# Patient Record
Sex: FEMALE | Race: WHITE | NOT HISPANIC OR LATINO | Employment: FULL TIME | URBAN - METROPOLITAN AREA
[De-identification: names, ages, dates, MRNs, and addresses within clinical notes are randomized per-mention and may not be internally consistent; named-entity substitution may affect disease eponyms.]

---

## 2017-03-01 ENCOUNTER — ALLSCRIPTS OFFICE VISIT (OUTPATIENT)
Dept: OTHER | Facility: OTHER | Age: 22
End: 2017-03-01

## 2017-03-01 DIAGNOSIS — M54.50 LOW BACK PAIN: ICD-10-CM

## 2017-03-13 ENCOUNTER — GENERIC CONVERSION - ENCOUNTER (OUTPATIENT)
Dept: OTHER | Facility: OTHER | Age: 22
End: 2017-03-13

## 2017-03-27 ENCOUNTER — ALLSCRIPTS OFFICE VISIT (OUTPATIENT)
Dept: OTHER | Facility: OTHER | Age: 22
End: 2017-03-27

## 2017-05-16 ENCOUNTER — ALLSCRIPTS OFFICE VISIT (OUTPATIENT)
Dept: OTHER | Facility: OTHER | Age: 22
End: 2017-05-16

## 2017-05-19 ENCOUNTER — LAB (OUTPATIENT)
Dept: LAB | Facility: HOSPITAL | Age: 22
End: 2017-05-19

## 2017-05-19 ENCOUNTER — TRANSCRIBE ORDERS (OUTPATIENT)
Dept: LAB | Facility: HOSPITAL | Age: 22
End: 2017-05-19

## 2017-05-19 DIAGNOSIS — Z11.1 SCREENING EXAMINATION FOR PULMONARY TUBERCULOSIS: ICD-10-CM

## 2017-05-19 DIAGNOSIS — Z11.1 SCREENING EXAMINATION FOR PULMONARY TUBERCULOSIS: Primary | ICD-10-CM

## 2017-05-19 PROCEDURE — 36415 COLL VENOUS BLD VENIPUNCTURE: CPT

## 2017-05-19 PROCEDURE — 86480 TB TEST CELL IMMUN MEASURE: CPT

## 2017-05-21 LAB
ANNOTATION COMMENT IMP: NORMAL
GAMMA INTERFERON BACKGROUND BLD IA-ACNC: 0.08 IU/ML
M TB IFN-G BLD-IMP: NEGATIVE
M TB IFN-G CD4+ BCKGRND COR BLD-ACNC: <0.01 IU/ML
M TB IFN-G CD4+ T-CELLS BLD-ACNC: 0.06 IU/ML
MITOGEN IGNF BLD-ACNC: 9.26 IU/ML
QUANTIFERON-TB GOLD IN TUBE: NORMAL
SERVICE CMNT-IMP: NORMAL

## 2017-05-25 DIAGNOSIS — Z00.00 ENCOUNTER FOR GENERAL ADULT MEDICAL EXAMINATION WITHOUT ABNORMAL FINDINGS: ICD-10-CM

## 2017-07-13 ENCOUNTER — ALLSCRIPTS OFFICE VISIT (OUTPATIENT)
Dept: OTHER | Facility: OTHER | Age: 22
End: 2017-07-13

## 2017-07-21 LAB
ADDITIONAL INFORMATION (HISTORICAL): NORMAL
ADEQUACY: (HISTORICAL): NORMAL
COMMENT (HISTORICAL): NORMAL
CYTOTECHNOLOGIST: (HISTORICAL): NORMAL
INTERPRETATION (HISTORICAL): NORMAL
LMP (HISTORICAL): NORMAL
PREV. BX: (HISTORICAL): NORMAL
PREV. PAP (HISTORICAL): NORMAL
SOURCE (HISTORICAL): NORMAL

## 2017-07-28 ENCOUNTER — GENERIC CONVERSION - ENCOUNTER (OUTPATIENT)
Dept: OTHER | Facility: OTHER | Age: 22
End: 2017-07-28

## 2018-01-10 NOTE — RESULT NOTES
Verified Results  (Q) THINPREP TIS PAP RFX HPV [id number] 12:00AM Ariadna Lockwood     Test Name Result Flag Reference   CLINICAL INFORMATION:      None given   LMP:      NONE GIVEN   PREV  PAP:      NONE GIVEN   PREV  BX:      NONE GIVEN   SOURCE:      [facility]   STATEMENT OF ADEQUACY:      Satisfactory for evaluation  Endocervical/transformation zone component  present  Age and/or menstrual status not provided   INTERPRETATION/RESULT:      Negative for intraepithelial lesion or malignancy  COMMENT:      This Pap test has been evaluated with computer  assisted technology     CYTOTECHNOLOGIST:      DI SOTELO(ASCP)  CT screening location: [facility], [address] [place] [address]

## 2018-01-12 VITALS
HEIGHT: 69 IN | SYSTOLIC BLOOD PRESSURE: 122 MMHG | BODY MASS INDEX: 19.11 KG/M2 | DIASTOLIC BLOOD PRESSURE: 60 MMHG | HEART RATE: 72 BPM | WEIGHT: 129 LBS

## 2018-01-12 NOTE — PSYCH
History of Present Illness  Psychotherapy Provided [facility]: Individual Psychotherapy 30 minuteas minutes provided today  Assessment    1   Anxiety disorder (300 00) (F41 9)    Signatures   Electronically signed by : Noelle Peterson LCSW; Mar 27 2017  2:29PM EST                       (Author)
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
## 2018-01-13 VITALS
BODY MASS INDEX: 18.74 KG/M2 | WEIGHT: 126.5 LBS | DIASTOLIC BLOOD PRESSURE: 82 MMHG | HEIGHT: 69 IN | SYSTOLIC BLOOD PRESSURE: 118 MMHG

## 2018-01-13 NOTE — MISCELLANEOUS
Provider Comments  Provider Comments:   Patient no-showed for an appt with me today at 2:30PM       Signatures   Electronically signed by : Nandini Velez LCSW; Mar 13 2017  2:59PM EST                       (Author)
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
## 2018-01-18 NOTE — MISCELLANEOUS
Message  Return to work or school:   Mary Anaya is under my professional care   She was seen in my office on 6/29/2016   She is able to return to work on  7/1/2016            Signatures   Electronically signed by : Kirill Sanchez, ; Jun 30 2016  2:47PM EST                       (Author)

## 2018-01-23 ENCOUNTER — ALLSCRIPTS OFFICE VISIT (OUTPATIENT)
Dept: OTHER | Facility: OTHER | Age: 23
End: 2018-01-23

## 2018-01-23 DIAGNOSIS — Z00.00 ENCOUNTER FOR GENERAL ADULT MEDICAL EXAMINATION WITHOUT ABNORMAL FINDINGS: ICD-10-CM

## 2018-01-23 DIAGNOSIS — F41.9 ANXIETY DISORDER: ICD-10-CM
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
## 2018-01-29 ENCOUNTER — OFFICE VISIT (OUTPATIENT)
Dept: BEHAVIORAL/MENTAL HEALTH CLINIC | Facility: CLINIC | Age: 23
End: 2018-01-29
Payer: COMMERCIAL

## 2018-01-29 DIAGNOSIS — F41.9 ANXIETY: Primary | ICD-10-CM

## 2018-01-29 PROCEDURE — 90832 PSYTX W PT 30 MINUTES: CPT | Performed by: SOCIAL WORKER

## 2018-01-29 NOTE — PATIENT INSTRUCTIONS
Provided supportive therapy  Reviewed coping strategies for anxiety  Offered additional sessions on a PRN basis

## 2018-01-29 NOTE — PROGRESS NOTES
Assessment/Plan:      There are no diagnoses linked to this encounter  Subjective:     Patient ID: Siva Leblanc is a 25 y o  female  Yulisa Presotn continues to experince anxiety secondary to test taking at school  Doing well in hr program and academically  Responded well to accommodations school provided previously for testing- longer time to complete and in quiet setting  Requesting updated letter for school as well  She is pleasant, verbal and cooperative  Review of Systems   Psychiatric/Behavioral: The patient is nervous/anxious  Objective:     Physical Exam   Constitutional: She is oriented to person, place, and time  Neurological: She is alert and oriented to person, place, and time  Psychiatric: Her speech is normal and behavior is normal  Judgment and thought content normal  Her mood appears anxious  Cognition and memory are normal    Denies any depressive symptoms

## 2018-02-08 ENCOUNTER — TELEPHONE (OUTPATIENT)
Dept: FAMILY MEDICINE CLINIC | Facility: CLINIC | Age: 23
End: 2018-02-08

## 2018-02-08 NOTE — TELEPHONE ENCOUNTER
PT IS CALLING FOR HER BLOODWORK RESULTS PLEASE  ALSO, THERE WAS PAPERWORK NEEDED TO BE FILLED OUT AFTER THE BLOODWORK CAME BACK  SHE IS ASKING IF THIS HAS BEEN COMPLETED YET  PLEASE CALL HER ASAP  THANK YOU

## 2018-02-12 NOTE — TELEPHONE ENCOUNTER
Pls let pt know due to the backup from [facility] got her results today  Her form is completed and will be faxed today  Her labs showed is was immune to everything but MMR so she needs a repeat MMR  Schedule would be one and then second one in one month so sooner and then if she needs new titers after this she needs to wait 2-3 months from second MMR to get done if her school wants it  Thank you

## 2018-02-12 NOTE — TELEPHONE ENCOUNTER
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

## 2018-02-12 NOTE — TELEPHONE ENCOUNTER
I called and LMOM for pt to c/b  She needs to repeat MMR series, #1 now and #2 1 month from the first vaccine and to repeat MMR titer in 2 to 3 months if school needs  Paperwork is on my desk

## 2018-02-14 ENCOUNTER — CLINICAL SUPPORT (OUTPATIENT)
Dept: FAMILY MEDICINE CLINIC | Facility: CLINIC | Age: 23
End: 2018-02-14
Payer: COMMERCIAL

## 2018-02-14 DIAGNOSIS — Z23 NEED FOR MMR VACCINE: Primary | ICD-10-CM

## 2018-02-14 PROCEDURE — 90707 MMR VACCINE SC: CPT | Performed by: FAMILY MEDICINE

## 2018-02-14 PROCEDURE — 90471 IMMUNIZATION ADMIN: CPT | Performed by: FAMILY MEDICINE

## 2018-02-14 NOTE — PROGRESS NOTES
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

## 2018-02-19 PROBLEM — M26.629 TMJ SYNDROME: Status: ACTIVE | Noted: 2017-03-01

## 2018-02-19 PROBLEM — F41.9 ANXIETY DISORDER: Status: ACTIVE | Noted: 2017-03-01

## 2018-02-20 ENCOUNTER — OFFICE VISIT (OUTPATIENT)
Dept: FAMILY MEDICINE CLINIC | Facility: CLINIC | Age: 23
End: 2018-02-20
Payer: COMMERCIAL

## 2018-02-20 VITALS
HEART RATE: 72 BPM | HEIGHT: 69 IN | WEIGHT: 135.4 LBS | BODY MASS INDEX: 20.06 KG/M2 | SYSTOLIC BLOOD PRESSURE: 122 MMHG | DIASTOLIC BLOOD PRESSURE: 70 MMHG

## 2018-02-20 DIAGNOSIS — F41.1 GENERALIZED ANXIETY DISORDER: Primary | ICD-10-CM

## 2018-02-20 PROCEDURE — 99214 OFFICE O/P EST MOD 30 MIN: CPT | Performed by: PHYSICIAN ASSISTANT

## 2018-02-20 RX ORDER — NORGESTIMATE AND ETHINYL ESTRADIOL
1 KIT DAILY
Refills: 0 | COMMUNITY
Start: 2018-01-30 | End: 2018-02-24 | Stop reason: SDUPTHER

## 2018-02-20 RX ORDER — OSELTAMIVIR PHOSPHATE 75 MG/1
CAPSULE ORAL
COMMUNITY
Start: 2018-02-18 | End: 2018-03-15 | Stop reason: ALTCHOICE

## 2018-02-20 RX ORDER — FLUTICASONE PROPIONATE 50 MCG
SPRAY, SUSPENSION (ML) NASAL AS NEEDED
COMMUNITY
Start: 2017-03-01 | End: 2020-05-27

## 2018-02-20 NOTE — PROGRESS NOTES
Assessment/Plan:    Generalized anxiety disorder  Today we completed a form for her college disabled [employer] to try to adequately summarize her level of anxiety and pinpoint what chain in her academic environment that could be changed to help her through her highest points of anxiety which does include test taking  Providing a quiet room an extended time for testing would definitely help in regard to her general anxiety disorder  She is currently on no medications and has met with Yoselin Wood at least twice  Overall she is doing very well  See copy of form  Diagnoses and all orders for this visit:    Generalized anxiety disorder    Other orders  -     Cetirizine HCl (ZYRTEC ALLERGY) 10 MG CAPS; Take by mouth  -     fluticasone (FLONASE ALLERGY RELIEF) 50 mcg/act nasal spray; into each nostril  -     Multiple Vitamins-Minerals (HAIR SKIN AND NAILS FORMULA PO); Take by mouth  -     TRI-LO-BEATRIZ 0 18/0 215/0 25 MG-25 MCG per tablet; Take 1 tablet by mouth daily  -     oseltamivir (TAMIFLU) 75 mg capsule;           Subjective:   CC: Follow up to anxiety  Pt  Has forms to be completed for school      Patient ID: Dhiraj Smith is a 25 y o  female  Patient is here today because of her generalized anxiety  This is a diagnosis that she has battled her entire life however she has never needed to take medication for it  Has not been until 1 year ago when she was attending [facility] [employer] and her anxiety was giving her difficulty in test completion  We filled out a form at that time sent her to Yoselin Wood for 2 visits and he completed forms for her as well  Unfortunately this only worked for a about 1 year now there is new employees at the office in [employer] and they want more detail paperwork completed for her current situation    The main issue is that the patient because of her generalized social anxiety when she is in a testing situation she becomes hyper aware of other people there movement and there are no wheezes which makes it very difficult for her to concentrate in this anxious state and therefore she is unable to finish test in a timely manner  Her generalized anxiety also give her problems with wanting to be with her friends and other large groups of people weather no neuro unknown  She does prefer to be by herself but when in a social environment she can enjoy it for S certain length of time  The forms that we are going to complete today will show that the patient should have accommodations at school for generalized anxiety such as a quiet room 1 more time for test taking in general         The following portions of the patient's history were reviewed and updated as appropriate: allergies, current medications, past family history, past medical history, past social history, past surgical history and problem list     Review of Systems   Constitutional: Negative  HENT: Negative  Eyes: Negative  Respiratory: Negative  Cardiovascular: Negative  Gastrointestinal: Negative  Endocrine: Negative  Genitourinary: Negative  Musculoskeletal: Negative  Skin: Negative  Allergic/Immunologic: Negative  Neurological: Negative  Hematological: Negative  Psychiatric/Behavioral: The patient is nervous/anxious  Objective:      Vitals:    02/20/18 1610   BP: 122/70   BP Location: Left arm   Patient Position: Sitting   Pulse: 72   Weight: 61 4 kg (135 lb 6 4 oz)   Height: 5' 9" (1 753 m)            Physical Exam   Constitutional: She is oriented to person, place, and time  She appears well-developed and well-nourished  No distress  HENT:   Head: Normocephalic and atraumatic  Eyes: Conjunctivae are normal  Right eye exhibits no discharge  Left eye exhibits no discharge  Cardiovascular: Normal rate  Pulmonary/Chest: Effort normal  No respiratory distress  Neurological: She is alert and oriented to person, place, and time  Skin: Skin is warm and dry  She is not diaphoretic  Psychiatric: She has a normal mood and affect   Her behavior is normal  Judgment and thought content normal

## 2018-02-20 NOTE — PATIENT INSTRUCTIONS
Problem List Items Addressed This Visit     Generalized anxiety disorder - Primary     Today we completed a form for her college disabled [employer] to try to adequately summarize her level of anxiety and pinpoint what chain in her academic environment that could be changed to help her through her highest points of anxiety which does include test taking  Providing a quiet room an extended time for testing would definitely help in regard to her general anxiety disorder  She is currently on no medications and has met with Mary Pruitt at least twice  Overall she is doing very well  See copy of form

## 2018-02-20 NOTE — ASSESSMENT & PLAN NOTE
Today we completed a form for her college disabled [employer] to try to adequately summarize her level of anxiety and pinpoint what chain in her academic environment that could be changed to help her through her highest points of anxiety which does include test taking  Providing a quiet room an extended time for testing would definitely help in regard to her general anxiety disorder  She is currently on no medications and has met with Phil Kelly at least twice  Overall she is doing very well  See copy of form

## 2018-02-24 DIAGNOSIS — Z30.41 ENCOUNTER FOR SURVEILLANCE OF CONTRACEPTIVE PILLS: Primary | ICD-10-CM

## 2018-02-26 RX ORDER — NORGESTIMATE AND ETHINYL ESTRADIOL
KIT
Qty: 84 TABLET | Refills: 0 | Status: SHIPPED | OUTPATIENT
Start: 2018-02-26 | End: 2018-05-21 | Stop reason: SDUPTHER

## 2018-02-28 ENCOUNTER — TELEPHONE (OUTPATIENT)
Dept: FAMILY MEDICINE CLINIC | Facility: CLINIC | Age: 23
End: 2018-02-28

## 2018-02-28 NOTE — TELEPHONE ENCOUNTER
Received a call from Simon Joseph from the [employer]  She was reviewing the form that I completed at the patient's last visit which was explaining that the patient does need a quite environment for testing purposes due to her generalized anxiety  Patient is insisting that she needs time half for test taking in addition to a quiet environment  The person on the phone is feeling that the patient needs extra time because she is more of a perfectionist and wanting to get better grades and sat for her circumstances she does not need time to half but just quite environment to complete her testing  I do agree that patient could use maybe 5 extra minutes for test taking to get settled and a quiet environment however the patient is asking for time half which to me does not seem appropriate

## 2018-03-12 ENCOUNTER — TELEPHONE (OUTPATIENT)
Dept: FAMILY MEDICINE CLINIC | Facility: CLINIC | Age: 23
End: 2018-03-12

## 2018-03-12 NOTE — TELEPHONE ENCOUNTER
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

## 2018-03-13 ENCOUNTER — CLINICAL SUPPORT (OUTPATIENT)
Dept: FAMILY MEDICINE CLINIC | Facility: CLINIC | Age: 23
End: 2018-03-13
Payer: COMMERCIAL

## 2018-03-13 DIAGNOSIS — Z23 NEED FOR MMR VACCINE: Primary | ICD-10-CM

## 2018-03-13 PROCEDURE — 90471 IMMUNIZATION ADMIN: CPT | Performed by: FAMILY MEDICINE

## 2018-03-13 PROCEDURE — 90707 MMR VACCINE SC: CPT | Performed by: FAMILY MEDICINE

## 2018-03-13 NOTE — TELEPHONE ENCOUNTER
WOW  I just reviewed pts scan of her titers from [facility] and her school is correct  She does need a re booster series of varicella as well as she did her MMR  I am really not sure how I missed this but please let her know I am sorry  I know there was mix up with her lab results from the beginning as I saw her for her PE the day before we went live with [facility] and we never actually got her results but they were in care everywhere  She needs two varicella vaccines at least one month apart but they need to be either with or one month apart from her two MMR boosters as well which should already be in progress  Her adacel and polio dates are in her chart  Thank you

## 2018-03-13 NOTE — PROGRESS NOTES
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

## 2018-03-15 ENCOUNTER — OFFICE VISIT (OUTPATIENT)
Dept: FAMILY MEDICINE CLINIC | Facility: CLINIC | Age: 23
End: 2018-03-15
Payer: COMMERCIAL

## 2018-03-15 VITALS
WEIGHT: 133.6 LBS | HEIGHT: 69 IN | BODY MASS INDEX: 19.79 KG/M2 | SYSTOLIC BLOOD PRESSURE: 122 MMHG | DIASTOLIC BLOOD PRESSURE: 68 MMHG | HEART RATE: 72 BPM

## 2018-03-15 DIAGNOSIS — R53.83 FATIGUE, UNSPECIFIED TYPE: ICD-10-CM

## 2018-03-15 DIAGNOSIS — F41.1 GENERALIZED ANXIETY DISORDER: Primary | ICD-10-CM

## 2018-03-15 PROCEDURE — 3008F BODY MASS INDEX DOCD: CPT | Performed by: FAMILY MEDICINE

## 2018-03-15 PROCEDURE — 99214 OFFICE O/P EST MOD 30 MIN: CPT | Performed by: FAMILY MEDICINE

## 2018-03-15 RX ORDER — ESCITALOPRAM OXALATE 5 MG/1
5 TABLET ORAL DAILY
Qty: 30 TABLET | Refills: 3 | Status: SHIPPED | OUTPATIENT
Start: 2018-03-15 | End: 2018-09-25 | Stop reason: ALTCHOICE

## 2018-03-15 RX ORDER — ADAPALENE 3 MG/G
GEL TOPICAL
Refills: 2 | COMMUNITY
Start: 2018-02-21 | End: 2018-10-02

## 2018-03-15 NOTE — PATIENT INSTRUCTIONS
Anxiety   WHAT YOU NEED TO KNOW:   What do I need to know about anxiety? Anxiety is a condition that causes you to feel extremely worried or nervous  The feelings are so strong that they can cause problems with your daily activities or sleep  Anxiety may be triggered by something you fear, or it may happen without a cause  Family or work stress, smoking, caffeine, and alcohol can increase your risk for anxiety  Certain medicines or health conditions can also increase your risk  Anxiety can become a long-term condition if it is not managed or treated  What other common signs and symptoms may occur with anxiety? · Fatigue or muscle tightness     · Shaking, restlessness, or irritability     · Problems focusing     · Trouble sleeping     · Feeling jumpy, easily startled, or dizzy     · Rapid heartbeat or shortness of breath  What do I need to tell my healthcare provider about my anxiety? Tell your healthcare provider when your symptoms began and what triggers them  Tell your provider if anxiety affects your daily activities  Your provider will also ask about your medical history and if you have family members with a similar condition  Tell your provider about your past and present alcohol, nicotine, or drug use  What can I do to manage anxiety? You may get medicines to help you feel calm and relaxed, and to decrease your symptoms  Medicines are usually given together with therapy or other treatments  The following can help you manage anxiety:  · Talk to someone about your anxiety  Your healthcare provider may suggest counseling  Cognitive behavioral therapy can help you understand and change how you react to events that trigger your symptoms  You might feel more comfortable talking with a friend or family member about your anxiety  Choose someone you know will be supportive and encouraging  · Find ways to relax  Activities such as exercise, meditation, or listening to music can help you relax   Spend time with friends, or do things you enjoy  · Practice deep breathing  Deep breathing can help you relax when you feel anxious  Focus on taking slow, deep breaths several times a day, or during an anxiety attack  Breathe in through your nose and out through your mouth  · Create a regular sleep routine  Regular sleep can help you feel calmer during the day  Go to sleep and wake up at the same times every day  Do not watch television or use the computer right before bed  Your room should be comfortable, dark, and quiet  · Eat a variety of healthy foods  Healthy foods include fruits, vegetables, low-fat dairy products, lean meats, fish, whole-grain breads, and cooked beans  Healthy foods can help you feel less anxious and have more energy  · Exercise regularly  Exercise can increase your energy level  Exercise may also lift your mood and help you sleep better  Your healthcare provider can help you create an exercise plan  · Do not smoke  Nicotine and other chemicals in cigarettes and cigars can increase anxiety  Ask your healthcare provider for information if you currently smoke and need help to quit  E-cigarettes or smokeless tobacco still contain nicotine  Talk to your healthcare provider before you use these products  · Do not have caffeine  Caffeine can make your symptoms worse  Do not have foods or drinks that are meant to increase your energy level  · Limit or do not drink alcohol  Ask your healthcare provider if alcohol is safe for you  You may not be able to drink alcohol if you take certain anxiety or depression medicines  Limit alcohol to 1 drink per day if you are a woman  Limit alcohol to 2 drinks per day if you are a man  A drink of alcohol is 12 ounces of beer, 5 ounces of wine, or 1½ ounces of liquor  · Do not use drugs  Drugs can make your anxiety worse  It can also make anxiety hard to manage  Talk to your healthcare provider if you use drugs and want help to quit    Call 911 if:   · You have chest pain, tightness, or heaviness that may spread to your shoulders, arms, jaw, neck, or back  · You feel like hurting yourself or someone else  When should I contact my healthcare provider? · Your symptoms get worse or do not get better with treatment  · Your anxiety keeps you from doing your regular daily activities  · You have new symptoms since your last visit  · You have questions or concerns about your condition or care  CARE AGREEMENT:   You have the right to help plan your care  Learn about your health condition and how it may be treated  Discuss treatment options with your caregivers to decide what care you want to receive  You always have the right to refuse treatment  The above information is an  only  It is not intended as medical advice for individual conditions or treatments  Talk to your doctor, nurse or pharmacist before following any medical regimen to see if it is safe and effective for you  © 2017 [address] [employer] Information is for End User's use only and may not be sold, redistributed or otherwise used for commercial purposes  All illustrations and images included in CareNotes® are the copyrighted property of A D A M , Inc  or Thiago Alvarado

## 2018-03-15 NOTE — PROGRESS NOTES
Assessment/Plan:    Generalized anxiety disorder  Out of control lately, discussed option with the patient, started on Lexapro 5 mg daily, side effect was discussed with the patient, follow up on a CBC, CMP, thyroid panel to rule out any metabolic disease  Patient to follow up in 6-8 weeks to discuss her response to the treatment and consider further options  Patient to continue with the counseling service at her school  Fatigue  Unclear the fatigue, follow up on a TSH, CBC  To increase her fluid intake  Diagnoses and all orders for this visit:    Generalized anxiety disorder  -     escitalopram (LEXAPRO) 5 mg tablet; Take 1 tablet (5 mg total) by mouth daily    Fatigue, unspecified type  -     CBC and differential  -     Comprehensive metabolic panel  -     T3, free  -     T4, free    Other orders  -     Adapalene 0 3 % gel; APPLY EVERY NIGHT AT BEDTIME TO FACE          Subjective:      Patient ID: Corrine Bass is a 25 y o  female  Chief complaint:  Pt is here because she states her anxiety is worsening   ~cd    Pt is fatigued , nervous , anxious, pt sx on/off for few years   Pt sd xare more sever and more frequent over the last few months   Pt was diangoes wit hanxiety nad saw a psychotherapsit to help with sxchool         The following portions of the patient's history were reviewed and updated as appropriate: allergies, current medications, past family history, past medical history, past social history, past surgical history and problem list     Review of Systems   Constitutional: Negative for appetite change, chills and fever  HENT: Negative for congestion, sore throat and voice change  Eyes: Negative for pain and visual disturbance  Respiratory: Negative for cough  Cardiovascular: Negative for chest pain and palpitations  Gastrointestinal: Negative for abdominal pain, constipation, diarrhea and nausea  Endocrine: Negative for cold intolerance, heat intolerance and polyuria  Genitourinary: Negative for dysuria, enuresis, flank pain and frequency  Musculoskeletal: Negative for gait problem, joint swelling and neck pain  Skin: Negative for color change and wound  Neurological: Negative for dizziness, syncope, speech difficulty, numbness and headaches  Psychiatric/Behavioral: Negative for behavioral problems and confusion  The patient is not nervous/anxious  Objective:    Vitals:    03/15/18 1246   BP: 122/68   BP Location: Left arm   Patient Position: Sitting   Cuff Size: Standard   Pulse: 72   Weight: 60 6 kg (133 lb 9 6 oz)   Height: 5' 9" (1 753 m)        Physical Exam   Constitutional: She is oriented to person, place, and time  She appears well-developed and well-nourished  HENT:   Head: Normocephalic and atraumatic  Eyes: Conjunctivae and EOM are normal  Pupils are equal, round, and reactive to light  Neck: Normal range of motion  Neck supple  Cardiovascular: Normal rate, regular rhythm, normal heart sounds and intact distal pulses  Pulmonary/Chest: Effort normal and breath sounds normal    Abdominal: Soft  Bowel sounds are normal    Musculoskeletal: Normal range of motion  Neurological: She is alert and oriented to person, place, and time  She has normal reflexes  Skin: Skin is warm and dry  Psychiatric: She has a normal mood and affect  Her behavior is normal    Vitals reviewed

## 2018-03-15 NOTE — ASSESSMENT & PLAN NOTE
Out of control lately, discussed option with the patient, started on Lexapro 5 mg daily, side effect was discussed with the patient, follow up on a CBC, CMP, thyroid panel to rule out any metabolic disease  Patient to follow up in 6-8 weeks to discuss her response to the treatment and consider further options  Patient to continue with the counseling service at her school

## 2018-03-16 ENCOUNTER — TELEPHONE (OUTPATIENT)
Dept: FAMILY MEDICINE CLINIC | Facility: CLINIC | Age: 23
End: 2018-03-16

## 2018-03-16 NOTE — TELEPHONE ENCOUNTER
Director of disability services called and would like to speak to you re: [place]  Please call at your earliest convenience  Thank you

## 2018-03-22 NOTE — TELEPHONE ENCOUNTER
I did talk to the director of the disability surface at [employer]  She was asking about this patient for to fill out to accommodate more testing time for her due to her generalized anxiety disorder  Please call patient and advice her to follow up with [facility] psychology service # [phone], I will recommend Dr Ry Quiroz for the evaluation and the form completion  Please convey this information to the patient

## 2018-03-22 NOTE — TELEPHONE ENCOUNTER
I spoke to pt and she is aware  She states she will call the insurance to see who she can go to that her insurance covers  I also advised we will keep her form in the blue folder until she squares this all away

## 2018-03-22 NOTE — TELEPHONE ENCOUNTER
Pt returned call, however no one was available to take call  Please call Pt back        [phone] Pt's ph#

## 2018-03-22 NOTE — TELEPHONE ENCOUNTER
I called and LMOM for pt to c/b to discuss  I also called Dr Andres Boudreaux office and they do not take pt's insurance so she can do 1 of 2 things  Go to see them and pay out of pocket and resubmit for coverage through insurance or pt can call her insurance and find a Psychologist her insurance will cover  She needs a Functional assessment from them before we can deysi out this form  Per DM this pt will not be happy about this but it is what needs to be done!

## 2018-04-02 ENCOUNTER — TELEPHONE (OUTPATIENT)
Dept: FAMILY MEDICINE CLINIC | Facility: CLINIC | Age: 23
End: 2018-04-02

## 2018-04-02 NOTE — TELEPHONE ENCOUNTER
Pt called and wanted to see/speak with you in regards to [address] [place] [address] that [facility] you  You had no appts available today and she could not make the only appt you had available next Monday, at [facility]'s location  Pt requested you call her    Thank you

## 2018-04-11 ENCOUNTER — CLINICAL SUPPORT (OUTPATIENT)
Dept: FAMILY MEDICINE CLINIC | Facility: CLINIC | Age: 23
End: 2018-04-11
Payer: COMMERCIAL

## 2018-04-11 DIAGNOSIS — Z23 NEED FOR VARICELLA VACCINE: Primary | ICD-10-CM

## 2018-04-11 DIAGNOSIS — Z23 NEED FOR TETANUS, DIPHTHERIA, AND ACELLULAR PERTUSSIS (TDAP) VACCINE: ICD-10-CM

## 2018-04-11 PROCEDURE — 90715 TDAP VACCINE 7 YRS/> IM: CPT | Performed by: FAMILY MEDICINE

## 2018-04-11 PROCEDURE — 90716 VAR VACCINE LIVE SUBQ: CPT | Performed by: FAMILY MEDICINE

## 2018-04-11 PROCEDURE — 90471 IMMUNIZATION ADMIN: CPT | Performed by: FAMILY MEDICINE

## 2018-04-11 PROCEDURE — 90472 IMMUNIZATION ADMIN EACH ADD: CPT | Performed by: FAMILY MEDICINE

## 2018-04-11 NOTE — PROGRESS NOTES
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

## 2018-05-15 ENCOUNTER — CLINICAL SUPPORT (OUTPATIENT)
Dept: FAMILY MEDICINE CLINIC | Facility: CLINIC | Age: 23
End: 2018-05-15
Payer: COMMERCIAL

## 2018-05-15 DIAGNOSIS — Z23 NEED FOR VARICELLA VACCINE: Primary | ICD-10-CM

## 2018-05-15 PROCEDURE — 90716 VAR VACCINE LIVE SUBQ: CPT | Performed by: FAMILY MEDICINE

## 2018-05-15 PROCEDURE — 90471 IMMUNIZATION ADMIN: CPT | Performed by: FAMILY MEDICINE

## 2018-05-15 NOTE — PROGRESS NOTES
Presents for 2nd varivax  Reconstituted with sterile diluent per directions and administered subcutaneously to (R) delt  Tolerated without difficulty and pt left the office in no distress  --bb

## 2018-05-19 DIAGNOSIS — Z30.41 ENCOUNTER FOR SURVEILLANCE OF CONTRACEPTIVE PILLS: ICD-10-CM

## 2018-05-21 DIAGNOSIS — Z30.41 ENCOUNTER FOR SURVEILLANCE OF CONTRACEPTIVE PILLS: ICD-10-CM

## 2018-05-21 RX ORDER — NORGESTIMATE AND ETHINYL ESTRADIOL
KIT
Qty: 84 TABLET | Refills: 0 | OUTPATIENT
Start: 2018-05-21

## 2018-05-21 RX ORDER — NORGESTIMATE AND ETHINYL ESTRADIOL
KIT
Qty: 84 TABLET | Refills: 0 | Status: CANCELLED | OUTPATIENT
Start: 2018-05-21

## 2018-05-21 RX ORDER — NORGESTIMATE AND ETHINYL ESTRADIOL 7DAYSX3 LO
1 KIT ORAL DAILY
Qty: 28 TABLET | Refills: 1 | Status: SHIPPED | OUTPATIENT
Start: 2018-05-21 | End: 2018-07-10 | Stop reason: SDUPTHER

## 2018-05-22 DIAGNOSIS — Z30.41 ENCOUNTER FOR SURVEILLANCE OF CONTRACEPTIVE PILLS: ICD-10-CM

## 2018-05-23 RX ORDER — NORGESTIMATE AND ETHINYL ESTRADIOL
KIT
Qty: 84 TABLET | Refills: 0 | OUTPATIENT
Start: 2018-05-23

## 2018-07-10 DIAGNOSIS — Z30.41 ENCOUNTER FOR SURVEILLANCE OF CONTRACEPTIVE PILLS: ICD-10-CM

## 2018-07-10 RX ORDER — NORGESTIMATE AND ETHINYL ESTRADIOL 7DAYSX3 LO
1 KIT ORAL DAILY
Qty: 28 TABLET | Refills: 0 | Status: SHIPPED | OUTPATIENT
Start: 2018-07-10 | End: 2018-08-06 | Stop reason: SDUPTHER

## 2018-07-16 DIAGNOSIS — Z30.41 ENCOUNTER FOR SURVEILLANCE OF CONTRACEPTIVE PILLS: Primary | ICD-10-CM

## 2018-07-16 RX ORDER — LEVONORGESTREL / ETHINYL ESTRADIOL AND ETHINYL ESTRADIOL 150-30(84)
1 KIT ORAL DAILY
Qty: 91 EACH | Refills: 0 | Status: SHIPPED | OUTPATIENT
Start: 2018-07-16 | End: 2018-10-03 | Stop reason: SDUPTHER

## 2018-08-06 DIAGNOSIS — Z30.41 ENCOUNTER FOR SURVEILLANCE OF CONTRACEPTIVE PILLS: ICD-10-CM

## 2018-08-06 RX ORDER — NORGESTIMATE AND ETHINYL ESTRADIOL
KIT
Qty: 28 TABLET | Refills: 0 | Status: SHIPPED | OUTPATIENT
Start: 2018-08-06 | End: 2018-10-02

## 2018-09-25 ENCOUNTER — OFFICE VISIT (OUTPATIENT)
Dept: FAMILY MEDICINE CLINIC | Facility: CLINIC | Age: 23
End: 2018-09-25
Payer: COMMERCIAL

## 2018-09-25 VITALS
BODY MASS INDEX: 19.32 KG/M2 | WEIGHT: 130.8 LBS | DIASTOLIC BLOOD PRESSURE: 60 MMHG | HEART RATE: 76 BPM | SYSTOLIC BLOOD PRESSURE: 122 MMHG

## 2018-09-25 DIAGNOSIS — E78.00 ELEVATED LDL CHOLESTEROL LEVEL: ICD-10-CM
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
## 2018-09-25 DIAGNOSIS — M54.50 CHRONIC BILATERAL LOW BACK PAIN WITHOUT SCIATICA: ICD-10-CM

## 2018-09-25 DIAGNOSIS — G89.29 CHRONIC BILATERAL LOW BACK PAIN WITHOUT SCIATICA: ICD-10-CM
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
## 2018-09-25 PROCEDURE — 99213 OFFICE O/P EST LOW 20 MIN: CPT | Performed by: PHYSICIAN ASSISTANT

## 2018-09-25 RX ORDER — ISOTRETINOIN 30 MG/1
CAPSULE, LIQUID FILLED ORAL
COMMUNITY
Start: 2018-08-24 | End: 2018-11-24

## 2018-09-25 NOTE — PROGRESS NOTES
Assessment/Plan:    Chronic bilateral low back pain without sciatica  Check lumbar spine x-ray and if within normal limits order PT eval and treat  Fall from horse  History of fall from horse 2016 with fracture pelvis  I do not see that a lumbar x-ray was ever done and even though patient was having low back pain prior to this still need to make sure there were no compression fractures at the time of the fall  Check x-ray lumbosacral spine  Elevated LDL cholesterol level  Levels are being monitored by Dermatology  We do not have the results at this time but there supposedly being fax  Evaluate when available  Diagnoses and all orders for this visit:    Fall from horse, sequela  -     XR spine lumbar 2 or 3 views injury; Future  -     Ambulatory referral to Physical Therapy; Future    Chronic bilateral low back pain without sciatica  -     XR spine lumbar 2 or 3 views injury; Future  -     Ambulatory referral to Physical Therapy; Future    Elevated LDL cholesterol level    Other orders  -     MYORISAN 30 MG capsule;           Subjective:   CC: c/o lower back pain and sore tailbone  Pt  States her dermatologist recently did blood work and noted an elevated LDL that pt  Is to follow up with pcp for  Patient ID: Bee Goodrich is a 25 y o  female  Low back pain on and off for the past several years which is made worse by being on Accutane from derm  She does ride horse and her tailbone has been hurting and would like this checked  She rides a horse once weekly year round  She did fall off of a horse in 2016 and had a pelvic fracture however I do not see any imaging of her low back  Patient states her low back pain had been prior to this but continue  She states that she does see a chiropractor every so often and that she believes maybe physical therapy would help strengthen her core and eliminating her back pain   She states that sitting for long periods of time specially during summer classes this past season makes her back pain worse  No numbness or tingling or decrease in weakness of either lower extremity  Also she has seen dermatology and is placed on Accutane for acne  They have been monitoring her blood work and stated that her LDL was elevated that she needed to follow up with us and were faxing the results  We however do not have these results at this time  The following portions of the patient's history were reviewed and updated as appropriate: allergies, current medications, past family history, past medical history, past social history, past surgical history and problem list     Review of Systems   Constitutional: Negative  HENT: Negative  Eyes: Negative  Respiratory: Negative  Cardiovascular: Negative  Gastrointestinal: Negative  Endocrine: Negative  Genitourinary: Negative  Musculoskeletal: Positive for back pain  Skin: Negative  Allergic/Immunologic: Negative  Neurological: Negative  Hematological: Negative  Psychiatric/Behavioral: Negative  Objective:      Vitals:    09/25/18 1437   BP: 122/60   BP Location: Left arm   Patient Position: Sitting   Pulse: 76   Weight: 59 3 kg (130 lb 12 8 oz)            Physical Exam   Constitutional: She is oriented to person, place, and time  She appears well-developed and well-nourished  No distress  HENT:   Head: Normocephalic and atraumatic  Eyes: Conjunctivae are normal  Right eye exhibits no discharge  Left eye exhibits no discharge  Neck: Neck supple  Carotid bruit is not present  Cardiovascular: Normal rate, regular rhythm and normal heart sounds  Exam reveals no gallop and no friction rub  No murmur heard  Pulmonary/Chest: Effort normal and breath sounds normal  No respiratory distress  She has no wheezes  She has no rales     Musculoskeletal:   Nontender low back to palpation of right left and midline with normal DTRs patellar and normal strength and range of motion with negative straight leg raise   Neurological: She is alert and oriented to person, place, and time  Skin: Skin is warm and dry  She is not diaphoretic  Psychiatric: She has a normal mood and affect  Judgment normal    Nursing note and vitals reviewed

## 2018-09-25 NOTE — ASSESSMENT & PLAN NOTE
History of fall from horse 2016 with fracture pelvis  I do not see that a lumbar x-ray was ever done and even though patient was having low back pain prior to this still need to make sure there were no compression fractures at the time of the fall  Check x-ray lumbosacral spine

## 2018-09-25 NOTE — PATIENT INSTRUCTIONS
Problem List Items Addressed This Visit        Other    Chronic bilateral low back pain without sciatica     Check lumbar spine x-ray and if within normal limits order PT eval and treat  Relevant Orders    XR spine lumbar 2 or 3 views injury    Ambulatory referral to Physical Therapy    Fall from horse - Primary     History of fall from horse 2016 with fracture pelvis  I do not see that a lumbar x-ray was ever done and even though patient was having low back pain prior to this still need to make sure there were no compression fractures at the time of the fall  Check x-ray lumbosacral spine           Relevant Orders    XR spine lumbar 2 or 3 views injury    Ambulatory referral to Physical Therapy

## 2018-09-25 NOTE — ASSESSMENT & PLAN NOTE
Levels are being monitored by Dermatology  We do not have the results at this time but there supposedly being fax  Evaluate when available

## 2018-09-26 ENCOUNTER — APPOINTMENT (OUTPATIENT)
Dept: RADIOLOGY | Facility: MEDICAL CENTER | Age: 23
End: 2018-09-26
Payer: COMMERCIAL

## 2018-09-26 DIAGNOSIS — M54.50 CHRONIC BILATERAL LOW BACK PAIN WITHOUT SCIATICA: ICD-10-CM
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
## 2018-09-26 DIAGNOSIS — G89.29 CHRONIC BILATERAL LOW BACK PAIN WITHOUT SCIATICA: ICD-10-CM

## 2018-09-26 PROCEDURE — 72100 X-RAY EXAM L-S SPINE 2/3 VWS: CPT

## 2018-10-02 ENCOUNTER — OFFICE VISIT (OUTPATIENT)
Dept: FAMILY MEDICINE CLINIC | Facility: CLINIC | Age: 23
End: 2018-10-02
Payer: COMMERCIAL

## 2018-10-02 VITALS
HEART RATE: 76 BPM | BODY MASS INDEX: 19.35 KG/M2 | DIASTOLIC BLOOD PRESSURE: 56 MMHG | WEIGHT: 131 LBS | SYSTOLIC BLOOD PRESSURE: 94 MMHG

## 2018-10-02 DIAGNOSIS — F41.1 GENERALIZED ANXIETY DISORDER: Primary | ICD-10-CM

## 2018-10-02 DIAGNOSIS — J30.9 ALLERGIC RHINITIS, UNSPECIFIED SEASONALITY, UNSPECIFIED TRIGGER: ICD-10-CM

## 2018-10-02 DIAGNOSIS — L70.0 ACNE VULGARIS: ICD-10-CM

## 2018-10-02 PROCEDURE — 99214 OFFICE O/P EST MOD 30 MIN: CPT | Performed by: FAMILY MEDICINE

## 2018-10-02 NOTE — PROGRESS NOTES
Assessment/Plan:    Generalized anxiety disorder  Patient tried Lexapro for about 2 weeks in June and she was intolerant to it patient has trouble falling asleep, her anxiety getting worse, patient said her anxiety is better controlled without any medication, she is able to 100 by doing more exercises and deep breathing exercises, advised patient to continue, referral to psychotherapist/psychologist was done  Patient feel her symptoms are not controlled then will consider Wellbutrin versus trazodone  Acne  Has regular follow-up with [facility], patient on Accutane for 3 months so far, she has regular blood work follow-up  No result is available to us  Diagnoses and all orders for this visit:    Generalized anxiety disorder  -     Ambulatory referral to Psychology; Future    Allergic rhinitis, unspecified seasonality, unspecified trigger    Acne vulgaris          Subjective: patient is here for a check up on her anxiety  Patient stopped taking Lexapro due to side effects-not being able to go to sleep, panic attacks  Patient only took it for 2-3 weeks in June  ak     Patient ID: Thierno Ybarra is a 25 y o  female  Anxiety   Presents for follow-up visit  Patient reports no chest pain, compulsions, confusion, decreased concentration, depressed mood, dizziness, dry mouth, excessive worry, feeling of choking, hyperventilation, impotence, insomnia, irritability, malaise, muscle tension, nausea, nervous/anxious behavior, obsessions, palpitations, panic, restlessness, shortness of breath or suicidal ideas  Symptoms occur constantly  The severity of symptoms is moderate  The quality of sleep is good  Nighttime awakenings: none             The following portions of the patient's history were reviewed and updated as appropriate: allergies, current medications, past family history, past medical history, past social history, past surgical history and problem list     Review of Systems   Constitutional: Negative for appetite change, chills, fever and irritability  HENT: Negative for congestion, sore throat and voice change  Eyes: Negative for pain and visual disturbance  Respiratory: Negative for cough and shortness of breath  Cardiovascular: Negative for chest pain and palpitations  Gastrointestinal: Negative for abdominal pain, constipation, diarrhea and nausea  Endocrine: Negative for cold intolerance, heat intolerance and polyuria  Genitourinary: Negative for dysuria, enuresis, flank pain, frequency and impotence  Musculoskeletal: Negative for gait problem, joint swelling and neck pain  Skin: Negative for color change and wound  Neurological: Negative for dizziness, syncope, speech difficulty, numbness and headaches  Psychiatric/Behavioral: Negative for behavioral problems, confusion, decreased concentration and suicidal ideas  The patient is not nervous/anxious and does not have insomnia  Objective:    BP 94/56   Pulse 76   Wt 59 4 kg (131 lb)   LMP 07/02/2018 (Approximate)   Breastfeeding? No   BMI 19 35 kg/m²      Physical Exam   Constitutional: She is oriented to person, place, and time  She appears well-developed and well-nourished  HENT:   Head: Normocephalic and atraumatic  Eyes: Pupils are equal, round, and reactive to light  Conjunctivae and EOM are normal    Cardiovascular: Normal rate, regular rhythm, normal heart sounds and intact distal pulses  Pulmonary/Chest: Effort normal and breath sounds normal    Musculoskeletal: Normal range of motion  Neurological: She is alert and oriented to person, place, and time  She has normal reflexes  Skin: Skin is warm and dry  Psychiatric: She has a normal mood and affect  Her behavior is normal    Vitals reviewed

## 2018-10-02 NOTE — ASSESSMENT & PLAN NOTE
Patient tried Lexapro for about 2 weeks in June and she was intolerant to it patient has trouble falling asleep, her anxiety getting worse, patient said her anxiety is better controlled without any medication, she is able to 100 by doing more exercises and deep breathing exercises, advised patient to continue, referral to psychotherapist/psychologist was done  Patient feel her symptoms are not controlled then will consider Wellbutrin versus trazodone

## 2018-10-02 NOTE — PATIENT INSTRUCTIONS
Anxiety   AMBULATORY CARE:   Anxiety  is a condition that causes you to feel extremely worried or nervous  The feelings are so strong that they can cause problems with your daily activities or sleep  Anxiety may be triggered by something you fear, or it may happen without a cause  Family or work stress, smoking, caffeine, and alcohol can increase your risk for anxiety  Certain medicines or health conditions can also increase your risk  Anxiety can become a long-term condition if it is not managed or treated  Common signs and symptoms that may occur with anxiety:   · Fatigue or muscle tightness     · Shaking, restlessness, or irritability     · Problems focusing     · Trouble sleeping     · Feeling jumpy, easily startled, or dizzy     · Rapid heartbeat or shortness of breath  Call 911 if:   · You have chest pain, tightness, or heaviness that may spread to your shoulders, arms, jaw, neck, or back  · You feel like hurting yourself or someone else  Contact your healthcare provider if:   · Your symptoms get worse or do not get better with treatment  · You think your medicine may be causing side effects  · Your anxiety keeps you from doing your regular daily activities  · You have new symptoms since your last visit  · You have questions or concerns about your condition or care  Treatment for anxiety  may include medicines to help you feel calm and relaxed, and decrease your symptoms  Medicines are usually given together with therapy or other treatments  Manage anxiety:   · Talk to someone about your anxiety  Your healthcare provider may suggest counseling  Cognitive behavioral therapy can help you understand and change how you react to events that trigger your symptoms  You might feel more comfortable talking with a friend or family member about your anxiety  Choose someone you know will be supportive and encouraging  · Find ways to relax    Activities such as exercise, meditation, or listening to music can help you relax  Spend time with friends, or do things you enjoy  · Practice deep breathing  Deep breathing can help you relax when you feel anxious  Focus on taking slow, deep breaths several times a day, or during an anxiety attack  Breathe in through your nose and out through your mouth  · Create a regular sleep routine  Regular sleep can help you feel calmer during the day  Go to sleep and wake up at the same times every day  Do not watch television or use the computer right before bed  Your room should be comfortable, dark, and quiet  · Eat a variety of healthy foods  Healthy foods include fruits, vegetables, low-fat dairy products, lean meats, fish, whole-grain breads, and cooked beans  Healthy foods can help you feel less anxious and have more energy  · Exercise regularly  Exercise can increase your energy level  Exercise may also lift your mood and help you sleep better  Your healthcare provider can help you create an exercise plan  · Do not smoke  Nicotine and other chemicals in cigarettes and cigars can increase anxiety  Ask your healthcare provider for information if you currently smoke and need help to quit  E-cigarettes or smokeless tobacco still contain nicotine  Talk to your healthcare provider before you use these products  · Do not have caffeine  Caffeine can make your symptoms worse  Do not have foods or drinks that are meant to increase your energy level  · Limit or do not drink alcohol  Ask your healthcare provider if alcohol is safe for you  You may not be able to drink alcohol if you take certain anxiety or depression medicines  Limit alcohol to 1 drink per day if you are a woman  Limit alcohol to 2 drinks per day if you are a man  A drink of alcohol is 12 ounces of beer, 5 ounces of wine, or 1½ ounces of liquor  · Do not use drugs  Drugs can make your anxiety worse  It can also make anxiety hard to manage   Talk to your healthcare provider if you use drugs and want help to quit  Follow up with your healthcare provider as directed:  Write down your questions so you remember to ask them during your visits  © 2017 [address] [employer] Information is for End User's use only and may not be sold, redistributed or otherwise used for commercial purposes  All illustrations and images included in CareNotes® are the copyrighted property of A D A M [employer] Inc  or [employer]var[employer]  The above information is an  only  It is not intended as medical advice for individual conditions or treatments  Talk to your doctor, nurse or pharmacist before following any medical regimen to see if it is safe and effective for you

## 2018-10-03 ENCOUNTER — APPOINTMENT (OUTPATIENT)
Dept: LAB | Facility: MEDICAL CENTER | Age: 23
End: 2018-10-03

## 2018-10-03 ENCOUNTER — APPOINTMENT (OUTPATIENT)
Dept: URGENT CARE | Facility: MEDICAL CENTER | Age: 23
End: 2018-10-03

## 2018-10-03 ENCOUNTER — TRANSCRIBE ORDERS (OUTPATIENT)
Dept: URGENT CARE | Facility: MEDICAL CENTER | Age: 23
End: 2018-10-03

## 2018-10-03 DIAGNOSIS — Z02.1 PRE-EMPLOYMENT HEALTH SCREENING EXAMINATION: Primary | ICD-10-CM

## 2018-10-03 DIAGNOSIS — Z30.41 ENCOUNTER FOR SURVEILLANCE OF CONTRACEPTIVE PILLS: ICD-10-CM

## 2018-10-03 DIAGNOSIS — Z02.1 PRE-EMPLOYMENT HEALTH SCREENING EXAMINATION: ICD-10-CM

## 2018-10-03 PROCEDURE — 86480 TB TEST CELL IMMUN MEASURE: CPT

## 2018-10-03 PROCEDURE — 36415 COLL VENOUS BLD VENIPUNCTURE: CPT

## 2018-10-03 RX ORDER — LEVONORGESTREL / ETHINYL ESTRADIOL AND ETHINYL ESTRADIOL 150-30(84)
1 KIT ORAL DAILY
Qty: 91 EACH | Refills: 0 | Status: SHIPPED | OUTPATIENT
Start: 2018-10-03 | End: 2019-01-08 | Stop reason: SDUPTHER

## 2018-10-09 LAB
GAMMA INTERFERON BACKGROUND BLD IA-ACNC: 0.03 IU/ML
M TB IFN-G BLD-IMP: NEGATIVE
M TB IFN-G CD4+ BCKGRND COR BLD-ACNC: 0.03 IU/ML
M TB IFN-G CD4+ BCKGRND COR BLD-ACNC: 0.07 IU/ML
MITOGEN IGNF BCKGRD COR BLD-ACNC: >10 IU/ML

## 2018-10-15 ENCOUNTER — TELEPHONE (OUTPATIENT)
Dept: FAMILY MEDICINE CLINIC | Facility: CLINIC | Age: 23
End: 2018-10-15

## 2018-10-15 NOTE — TELEPHONE ENCOUNTER
Patient needs a note sent to 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

## 2018-10-17 ENCOUNTER — TELEPHONE (OUTPATIENT)
Dept: OBGYN CLINIC | Facility: MEDICAL CENTER | Age: 23
End: 2018-10-17

## 2018-10-17 ENCOUNTER — TELEPHONE (OUTPATIENT)
Dept: FAMILY MEDICINE CLINIC | Facility: CLINIC | Age: 23
End: 2018-10-17

## 2018-10-17 NOTE — TELEPHONE ENCOUNTER
States she was 2 hours late with taking 1 pill in current pack of birth control  Started bleeding 2 tampons/day  Now in placebo pills and cont  With bleeding    Advised to continue will pills, if bleeding persists in active pills of new pack, call back to schedule appointment

## 2018-11-05 ENCOUNTER — EVALUATION (OUTPATIENT)
Dept: PHYSICAL THERAPY | Facility: CLINIC | Age: 23
End: 2018-11-05
Payer: COMMERCIAL

## 2018-11-05 DIAGNOSIS — M54.50 CHRONIC BILATERAL LOW BACK PAIN WITHOUT SCIATICA: ICD-10-CM

## 2018-11-05 DIAGNOSIS — G89.29 CHRONIC BILATERAL LOW BACK PAIN WITHOUT SCIATICA: ICD-10-CM

## 2018-11-05 PROCEDURE — G8991 OTHER PT/OT GOAL STATUS: HCPCS | Performed by: PHYSICAL THERAPIST

## 2018-11-05 PROCEDURE — 97161 PT EVAL LOW COMPLEX 20 MIN: CPT | Performed by: PHYSICAL THERAPIST

## 2018-11-05 PROCEDURE — G8990 OTHER PT/OT CURRENT STATUS: HCPCS | Performed by: PHYSICAL THERAPIST

## 2018-11-05 PROCEDURE — 97112 NEUROMUSCULAR REEDUCATION: CPT | Performed by: PHYSICAL THERAPIST

## 2018-11-05 PROCEDURE — 97110 THERAPEUTIC EXERCISES: CPT | Performed by: PHYSICAL THERAPIST

## 2018-11-05 NOTE — PROGRESS NOTES
PT Evaluation     Today's date: 2018  Patient name: Tony Del Valle  : 1995  MRN: [id number]  Referring provider: Joy Wilkes  Dx:   Encounter Diagnosis     ICD-10-CM    1  Chronic bilateral low back pain without sciatica M54 5 Ambulatory referral to Physical Therapy    G89 29        Start Time: 1700  Stop Time: 1750  Total time in clinic (min): 50 minutes    Assessment  Functional limitations: limited sitting tolerance, limited lifting, decreased recreation tolerance  Assessment details: Pt is a 24 yo female with diagnosis of chronic low back pain presenting with decreased BLE flexibility, poor core stability, postural deficits, and limited functional activity tolerance  She is an excellent candidate for outpatient physical therapy to address the above impairments and optimize functional status  Barriers to therapy: High copay; to schedule PT 1x/week  Understanding of Dx/Px/POC: good   Prognosis: good    Goals  STG - 3 weeks  1  Independent with HEP  2  Maintain neutral posture in sitting without cueing  3  Obtain bilateral orthotics for overpronation  LTG - 4-6 weeks  1  Normalize lower abdominal and B hip extensor strength to indicate improved core stability  2  Tolerate sitting for at least 30 min without onset of pain to allow resuming PLOF  3  Normalize BLE flexibility to promote good body mechanics during daily tasks  4  Improve FOTO score to reflect improved functional activity tolerance  Plan  Patient would benefit from: skilled physical therapy  Planned modality interventions: low level laser therapy  Planned therapy interventions: manual therapy, neuromuscular re-education, orthotic fitting/training, patient education, postural training, therapeutic activities, therapeutic exercise and home exercise program  Frequency: 1x week  Duration in weeks: 6  Treatment plan discussed with: patient  Plan details: Provided with and reviewed initial written HEP   Recommended obtaining OTC arch supports if orthotics are not covered by her health insurance  Subjective Evaluation    History of Present Illness  Date of onset: 2015  Mechanism of injury: Pt has been having low back pain and stiffness for the last 3 years, which increased when she fell off a horse in 2017  She had a recent xray which came back normal  She denies any numbness, tingling, burning, or radicular symptoms  She reports the pain is worse when she's sitting  PMH significant for L pelvic fx 2017, L jaw fx 2017  Pain  Current pain ratin  At best pain ratin  At worst pain ratin  Location: low back  Quality: dull ache  Relieving factors: change in position  Aggravating factors: sitting  Progression: no change    Social Support    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[employer]; 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[employer])  Exercise history: rides horses regular, group fitness classes      Diagnostic Tests  X-ray: normal  Treatments  Current treatment: chiropractic  Current treatment comments: goes every few weeks       Patient Goals  Patient goals for therapy: increased motion, decreased pain and increased strength          Objective     Postural Observations  Seated posture: poor  Standing posture: fair  Correction of posture: has no consistent effect    Additional Postural Observation Details  Overpronation bilaterally L>>R    Tenderness     Additional Tenderness Details  No distinct TTP throughout lumbar spine    Active Range of Motion     Lumbar   Normal active range of motion    Passive Range of Motion     Additional Passive Range of Motion Details  Piriformis and Hamstrings B = moderately restricted    Strength/Myotome Testing     Left Hip   Planes of Motion   Flexion: 5  Extension: 4-  Abduction: 4+  External rotation: 4+    Right Hip   Planes of Motion   Flexion: 5  Extension: 4-  Abduction: 4+  External rotation: 4+    Left Knee   Flexion: 5  Extension: 5    Right Knee   Flexion: 5  Extension: 5    Left Ankle/Foot   Dorsiflexion: 5  Plantar flexion: 5    Right Ankle/Foot   Dorsiflexion: 5  Plantar flexion: 5    Muscle Activation     Additional Muscle Activation Details  Poor TA activation    Tests     Lumbar   Negative repeated flexion and repeated extension  Left   Negative passive SLR  Right   Negative passive SLR  Left Pelvic Girdle/Sacrum   Negative: sacrum compression  Right Pelvic Girdle/Sacrum   Negative: sacrum compression  Left Hip   Negative Gaenslen's  Right Hip   Negative Gaenslen's       Additional Tests Details  No pelvic asymmetries noted with Long Sit Test      Flowsheet Rows      Most Recent Value   PT/OT G-Codes   Current Score  74   Projected Score  82   Assessment Type  Evaluation   G code set  Other PT/OT Primary   Other PT Primary Current Status ()  CJ   Other PT Primary Goal Status ()  CI          Precautions: none    Daily Treatment Diary       Manuals 11/5                                                                Exercise Diary              Elliptical w/ AB             Hamstring str w/ strap 30"x3            Piriformis str 30"x3            AB 5"x10            AB w/ bridge & hip add             AB w/ bridge & hip abd             AB w/ march 10x            Prone/quad Alt UE/LE             Prone hip ext ke, kf             [employer]drant + [employer] over [employer] w/ tband                                                                                                                                               Modalities

## 2018-11-13 ENCOUNTER — OFFICE VISIT (OUTPATIENT)
Dept: PHYSICAL THERAPY | Facility: CLINIC | Age: 23
End: 2018-11-13
Payer: COMMERCIAL

## 2018-11-13 DIAGNOSIS — M54.50 CHRONIC BILATERAL LOW BACK PAIN WITHOUT SCIATICA: Primary | ICD-10-CM

## 2018-11-13 DIAGNOSIS — G89.29 CHRONIC BILATERAL LOW BACK PAIN WITHOUT SCIATICA: Primary | ICD-10-CM

## 2018-11-13 PROCEDURE — 97110 THERAPEUTIC EXERCISES: CPT

## 2018-11-13 PROCEDURE — 97112 NEUROMUSCULAR REEDUCATION: CPT

## 2018-11-13 NOTE — PROGRESS NOTES
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 elliptical at school, 2-3x until NV, for 5-7 min  , pt will also perform squats over a chair 10x daily  Will monitor   Patient would benefit from continued PT      Plan: Continue per plan of care         Precautions: none     Daily Treatment Diary         Manuals                                                                                                                    Exercise Diary                        Elliptical w/ AB    5'                   Hamstring str w/ strap 30"x3  30"x3                   Piriformis str 30"x3  30"x3                   AB 5"x10  5"x10                   AB w/ bridge & hip add    5"x20                   AB w/ bridge & hip abd    5"x20                   AB / march 10x  20x                   Prone/quad Alt UE/LE    10x                   Prone hip ext ke, kf    10x  each                   Fire Hydrant + [employer]                       Squats over mat    10x                   Deadlift w/ tband                                                                                                                                                                                                                                                                       Modalities

## 2018-11-20 ENCOUNTER — OFFICE VISIT (OUTPATIENT)
Dept: PHYSICAL THERAPY | Facility: CLINIC | Age: 23
End: 2018-11-20
Payer: COMMERCIAL

## 2018-11-20 DIAGNOSIS — M54.50 CHRONIC BILATERAL LOW BACK PAIN WITHOUT SCIATICA: Primary | ICD-10-CM

## 2018-11-20 DIAGNOSIS — G89.29 CHRONIC BILATERAL LOW BACK PAIN WITHOUT SCIATICA: Primary | ICD-10-CM

## 2018-11-20 PROCEDURE — 97112 NEUROMUSCULAR REEDUCATION: CPT

## 2018-11-20 PROCEDURE — 97110 THERAPEUTIC EXERCISES: CPT

## 2018-11-20 NOTE — PROGRESS NOTES
Daily Note     Today's date: 2018  Patient name: Frank Gómez  : 1995  MRN: [id number]  Referring provider: Lucille Meza  Dx:   Encounter Diagnosis     ICD-10-CM    1  Chronic bilateral low back pain without sciatica M54 5     G89 29                   Subjective: Pt reports she started having pain in her upper back yesterday, which cont today, 4/10  Notes her LB feels the same, stiff and tight  Objective: See treatment diary below      Assessment: New exercises performed w/o difficulty or discomfort  Required minimal vc'ing during same  Will monitor  Patient would benefit from continued PT  Unable to access HEP sites to issue updated ex program  Verbally instructed pt to perform new exercises at home 10reps each, daily, issued GTB for dead lift ex         Plan: Continue per plan of care         Precautions: none     Daily Treatment Diary         Manuals                                                                                                                  Exercise Diary                        Elliptical w/ AB    5'  6'                 Hamstring str w/ strap 30"x3  30"x3  30"x3                 Piriformis str 30"x3  30"x3  30"x3                 AB 5"x10  5"x10  5"x10                 AB w/ bridge & hip add    5"x20  5"x20                   AB w/ bridge & hip abd    5"x20  5"x20  GTB                 AB / march 10x  20x  20x                 Prone/quad Alt UE/LE    10x  10x                 Prone hip ext ke, kf    10x  each  2x10  each                 [employer] Hydrant + [place]      10x                 Squats over mat    10x  10x                 Deadlift w/ tband      10x  GTB                                                                                                                                                                                                                                                                 Modalities

## 2018-11-24 ENCOUNTER — APPOINTMENT (EMERGENCY)
Dept: RADIOLOGY | Facility: HOSPITAL | Age: 23
End: 2018-11-24
Payer: COMMERCIAL

## 2018-11-24 ENCOUNTER — HOSPITAL ENCOUNTER (EMERGENCY)
Facility: HOSPITAL | Age: 23
Discharge: HOME | End: 2018-11-25
Attending: EMERGENCY MEDICINE | Admitting: EMERGENCY MEDICINE
Payer: COMMERCIAL

## 2018-11-24 DIAGNOSIS — R94.31 ABNORMAL ECG: ICD-10-CM

## 2018-11-24 DIAGNOSIS — R07.9 CHEST PAIN: Primary | ICD-10-CM

## 2018-11-24 PROCEDURE — 36415 COLL VENOUS BLD VENIPUNCTURE: CPT | Performed by: EMERGENCY MEDICINE

## 2018-11-24 PROCEDURE — 71046 X-RAY EXAM CHEST 2 VIEWS: CPT

## 2018-11-24 PROCEDURE — 80053 COMPREHEN METABOLIC PANEL: CPT | Performed by: EMERGENCY MEDICINE

## 2018-11-24 PROCEDURE — 93005 ELECTROCARDIOGRAM TRACING: CPT

## 2018-11-24 PROCEDURE — 81025 URINE PREGNANCY TEST: CPT | Performed by: EMERGENCY MEDICINE

## 2018-11-24 PROCEDURE — 99285 EMERGENCY DEPT VISIT HI MDM: CPT

## 2018-11-24 PROCEDURE — 85025 COMPLETE CBC W/AUTO DIFF WBC: CPT | Performed by: EMERGENCY MEDICINE

## 2018-11-24 PROCEDURE — 84484 ASSAY OF TROPONIN QUANT: CPT | Performed by: EMERGENCY MEDICINE

## 2018-11-24 RX ORDER — ISOTRETINOIN 10 MG/1
30 CAPSULE ORAL 2 TIMES DAILY
COMMUNITY
End: 2019-02-25 | Stop reason: ALTCHOICE

## 2018-11-25 VITALS
OXYGEN SATURATION: 100 % | HEIGHT: 69 IN | TEMPERATURE: 98.4 F | RESPIRATION RATE: 16 BRPM | WEIGHT: 130 LBS | HEART RATE: 73 BPM | SYSTOLIC BLOOD PRESSURE: 116 MMHG | BODY MASS INDEX: 19.26 KG/M2 | DIASTOLIC BLOOD PRESSURE: 70 MMHG

## 2018-11-25 LAB
ALBUMIN SERPL BCP-MCNC: 3.9 G/DL (ref 3.5–5)
ALP SERPL-CCNC: 36 U/L (ref 46–116)
ALT SERPL W P-5'-P-CCNC: 13 U/L (ref 12–78)
ANION GAP SERPL CALCULATED.3IONS-SCNC: 7 MMOL/L (ref 4–13)
AST SERPL W P-5'-P-CCNC: 15 U/L (ref 5–45)
BASOPHILS # BLD AUTO: 0.02 THOUSANDS/ΜL (ref 0–0.1)
BASOPHILS NFR BLD AUTO: 0 % (ref 0–1)
BILIRUB SERPL-MCNC: 0.18 MG/DL (ref 0.2–1)
BILIRUB UR QL STRIP: NEGATIVE
BUN SERPL-MCNC: 12 MG/DL (ref 5–25)
CALCIUM SERPL-MCNC: 10 MG/DL (ref 8.3–10.1)
CHLORIDE SERPL-SCNC: 105 MMOL/L (ref 100–108)
CLARITY UR: CLEAR
CO2 SERPL-SCNC: 24 MMOL/L (ref 21–32)
COLOR UR: YELLOW
CREAT SERPL-MCNC: 0.73 MG/DL (ref 0.6–1.3)
EOSINOPHIL # BLD AUTO: 0.12 THOUSAND/ΜL (ref 0–0.61)
EOSINOPHIL NFR BLD AUTO: 2 % (ref 0–6)
ERYTHROCYTE [DISTWIDTH] IN BLOOD BY AUTOMATED COUNT: 13 % (ref 11.6–15.1)
EXT PREG TEST URINE: NEGATIVE
GFR SERPL CREATININE-BSD FRML MDRD: 117 ML/MIN/1.73SQ M
GLUCOSE SERPL-MCNC: 97 MG/DL (ref 65–140)
GLUCOSE UR STRIP-MCNC: NEGATIVE MG/DL
HCT VFR BLD AUTO: 38.5 % (ref 34.8–46.1)
HGB BLD-MCNC: 12.8 G/DL (ref 11.5–15.4)
HGB UR QL STRIP.AUTO: NEGATIVE
IMM GRANULOCYTES # BLD AUTO: 0.01 THOUSAND/UL (ref 0–0.2)
IMM GRANULOCYTES NFR BLD AUTO: 0 % (ref 0–2)
KETONES UR STRIP-MCNC: NEGATIVE MG/DL
LEUKOCYTE ESTERASE UR QL STRIP: NEGATIVE
LYMPHOCYTES # BLD AUTO: 2.58 THOUSANDS/ΜL (ref 0.6–4.47)
LYMPHOCYTES NFR BLD AUTO: 40 % (ref 14–44)
MCH RBC QN AUTO: 28.6 PG (ref 26.8–34.3)
MCHC RBC AUTO-ENTMCNC: 33.2 G/DL (ref 31.4–37.4)
MCV RBC AUTO: 86 FL (ref 82–98)
MONOCYTES # BLD AUTO: 0.45 THOUSAND/ΜL (ref 0.17–1.22)
MONOCYTES NFR BLD AUTO: 7 % (ref 4–12)
NEUTROPHILS # BLD AUTO: 3.29 THOUSANDS/ΜL (ref 1.85–7.62)
NEUTS SEG NFR BLD AUTO: 51 % (ref 43–75)
NITRITE UR QL STRIP: NEGATIVE
NRBC BLD AUTO-RTO: 0 /100 WBCS
PH UR STRIP.AUTO: 6.5 [PH] (ref 4.5–8)
PLATELET # BLD AUTO: 234 THOUSANDS/UL (ref 149–390)
PMV BLD AUTO: 10.1 FL (ref 8.9–12.7)
POTASSIUM SERPL-SCNC: 3.5 MMOL/L (ref 3.5–5.3)
PROT SERPL-MCNC: 7.5 G/DL (ref 6.4–8.2)
PROT UR STRIP-MCNC: NEGATIVE MG/DL
RBC # BLD AUTO: 4.47 MILLION/UL (ref 3.81–5.12)
SODIUM SERPL-SCNC: 136 MMOL/L (ref 136–145)
SP GR UR STRIP.AUTO: 1.01 (ref 1–1.03)
TROPONIN I SERPL-MCNC: <0.02 NG/ML
UROBILINOGEN UR QL STRIP.AUTO: 0.2 E.U./DL
WBC # BLD AUTO: 6.47 THOUSAND/UL (ref 4.31–10.16)

## 2018-11-25 PROCEDURE — 81003 URINALYSIS AUTO W/O SCOPE: CPT

## 2018-11-25 NOTE — ED RE-EVALUATION NOTE
ECG reveals possible epsilon wave in V1, with RBBB  TWI in V1, V2  No hx of syncope  pts mother  at age 58 but not sudden, and death was expected  ECG finding may be benign/normal variant but would recommend outpt f/u with cards, possible echo if needed       [address] [facility], DO  [id number]

## 2018-11-25 NOTE — ED ATTENDING ATTESTATION
Steph Anderson DO, saw and evaluated the patient  I have discussed the patient with the resident/non-physician practitioner and agree with the resident's/non-physician practitioner's findings, Plan of Care, and MDM as documented in the resident's/non-physician practitioner's note, except where noted  All available labs and Radiology studies were reviewed  At this point I agree with the current assessment done in the Emergency Department  I have conducted an independent evaluation of this patient a history and physical is as follows:    24 yo female c/o 2d hx aching/burning L sided chest pain lasting a minute or two at a time  Associated with anxiety, nausea without vomiting  Denies dyspnea, fever, cough, palpitations, diaphoresis, leg pain or swelling  No a/e factors  Denies radiation of pain  Last episode about 1 hr PTA  No hx of DVT/PE    Pt on progestin-only OCP  Heart S1S2 RRR no mcrg  Lungs CTAB no wrr  Ext no c/c/e, calf non TTP B  Imp: chest pain atypical, doubt ACS plan: ECG  Reassess        Critical Care Time  CritCare Time    Procedures

## 2018-11-25 NOTE — ED PROVIDER NOTES
History  Chief Complaint   Patient presents with    Chest Pain     Pt states that she has had chest pain off and on for the past 2 days  She thinks that its anxiety related but wanted to get it checked out  26 yo F presents to ED for evaluation of 2 day hx of intermittent left-sided aching/burning non-radiating intermittent chest discomfort with associated dyspnea/tachypnea and dizziness with occasional paresthesias in hands  Pt describes episodes of lasting 10-15 minutes, no alleviating or exacerbating factors  Pt states that episodes are not exercise-induced  Pt states symptoms are similar to previously described panic attacks but states she was concerned by the frequency of chest pain over the last 2 days  Pt denies any trauma/injury, HA, visual changes, neck/back pain, syncope, cough/cold symptoms, fever/chills, abdominal pain, N/V/D, urinary symptoms, rash, unilateral/bilateral LE edema or focal neuro deficits  Pt has PMH of depression/anxiety, traumatic injuries from falling off of a horse (mandible fx and pelvic fxs) , PSH of wisdom tooth extraction  Pt is a nonsmoker, denies any ETOH or recreational drug use  No change in diet or excessive caffeine intake  Pt states family hx of mother dying in her 63's from MI (states hx of additional CAD risk factors prior to this event)  No recent travel or known sick contacts  No interventions prior to arrival, no other complaints at this time  Prior to Admission Medications   Prescriptions Last Dose Informant Patient Reported? Taking?    ISOtretinoin (ACCUTANE) 10 MG capsule 11/24/2018 at Unknown time  Yes Yes   Sig: Take 30 mg by mouth 2 (two) times a day   Levonorgest-Eth Estrad 91-Day 0 15-0 03 &0 01 MG TABS 11/24/2018 at Unknown time  No Yes   Sig: Take 1 tablet by mouth daily   Multiple Vitamins-Minerals (HAIR SKIN AND NAILS FORMULA PO)   Yes Yes   Sig: Take by mouth   fluticasone (FLONASE ALLERGY RELIEF) 50 mcg/act nasal spray   Yes Yes   Sig: into each nostril      Facility-Administered Medications: None       Past Medical History:   Diagnosis Date    Fracture of pelvis ([facility] [address] )     Last Assessed:  3/1/17    Jaw fracture ([facility] [address] )     Last Assessed:  3/1/17       History reviewed  No pertinent surgical history  Family History   Problem Relation Age of Onset    Coronary artery disease Mother     Hyperlipidemia Mother     Hypothyroidism Mother     Diabetes Maternal Grandfather      I have reviewed and agree with the history as documented  Social History   Substance Use Topics    Smoking status: Never Smoker    Smokeless tobacco: Never Used      Comment: No secondhand smoke exposure    Alcohol use No        Review of Systems   Constitutional: Negative for chills, fatigue and fever  HENT: Negative for congestion, rhinorrhea and sore throat  Eyes: Negative for pain, redness and visual disturbance  Respiratory: Positive for shortness of breath  Negative for cough, chest tightness, wheezing and stridor  Cardiovascular: Positive for chest pain and palpitations  Negative for leg swelling  Gastrointestinal: Negative for abdominal pain, blood in stool, constipation, diarrhea, nausea and vomiting  Genitourinary: Negative for dysuria, frequency, hematuria and urgency  Musculoskeletal: Negative for back pain and neck pain  Skin: Negative for pallor and rash  Neurological: Positive for dizziness  Negative for seizures, syncope, weakness, light-headedness and headaches  Intermittent upper extremity paresthesias with events   Psychiatric/Behavioral: Positive for dysphoric mood  Negative for agitation, confusion, hallucinations, self-injury and suicidal ideas  The patient is nervous/anxious          Physical Exam  ED Triage Vitals [11/24/18 2300]   Temperature Pulse Respirations Blood Pressure SpO2   98 4 °F (36 9 °C) 76 18 125/79 99 %      Temp Source Heart Rate Source Patient Position - Orthostatic VS BP Location FiO2 (%)   Tympanic Monitor Sitting Left arm --      Pain Score       2           Orthostatic Vital Signs  Vitals:    11/24/18 2300 11/25/18 0035 11/25/18 0100 11/25/18 0202   BP: 125/79 117/70 106/62 116/70   Pulse: 76 73 72 73   Patient Position - Orthostatic VS: Sitting Lying  Lying       Physical Exam   Constitutional: She is oriented to person, place, and time  She appears well-developed and well-nourished  No distress  HENT:   Head: Normocephalic and atraumatic  Nose: Nose normal    Mouth/Throat: Oropharynx is clear and moist  No oropharyngeal exudate  Eyes: Pupils are equal, round, and reactive to light  Conjunctivae and EOM are normal  Right eye exhibits no discharge  Left eye exhibits no discharge  Neck: Normal range of motion  Neck supple  No JVD present  No tracheal deviation present  Cardiovascular: Normal rate, regular rhythm, normal heart sounds and intact distal pulses  Exam reveals no gallop and no friction rub  No murmur heard  Pulmonary/Chest: Effort normal and breath sounds normal  No stridor  No respiratory distress  She has no wheezes  She has no rales  She exhibits no tenderness  Abdominal: Soft  Bowel sounds are normal  She exhibits no distension  There is no tenderness  There is no rebound and no guarding  Musculoskeletal: Normal range of motion  She exhibits no edema, tenderness or deformity  Neurological: She is alert and oriented to person, place, and time  No cranial nerve deficit  Skin: Skin is warm and dry  Capillary refill takes less than 2 seconds  No rash noted  She is not diaphoretic  Psychiatric: She has a normal mood and affect  Nursing note and vitals reviewed        ED Medications  Medications - No data to display    Diagnostic Studies  Results Reviewed     Procedure Component Value Units Date/Time    Troponin I [[id number]]  (Normal) Collected:  11/24/18 3738    Lab Status:  Final result Specimen:  Blood from Arm, Left Updated:  11/25/18 2098     Troponin I <0 02 ng/mL Comprehensive metabolic panel [[id number]]  (Abnormal) Collected:  11/24/18 2354    Lab Status:  Final result Specimen:  Blood from Arm, Left Updated:  11/25/18 0136     Sodium 136 mmol/L      Potassium 3 5 mmol/L      Chloride 105 mmol/L      CO2 24 mmol/L      ANION GAP 7 mmol/L      BUN 12 mg/dL      Creatinine 0 73 mg/dL      Glucose 97 mg/dL      Calcium 10 0 mg/dL      AST 15 U/L      ALT 13 U/L      Alkaline Phosphatase 36 (L) U/L      Total Protein 7 5 g/dL      Albumin 3 9 g/dL      Total Bilirubin 0 18 (L) mg/dL      eGFR 117 ml/min/1 73sq m     Narrative:         National Kidney Disease Education Program recommendations are as follows:  GFR calculation is accurate only with a steady state creatinine  Chronic Kidney disease less than 60 ml/min/1 73 sq  meters  Kidney failure less than 15 ml/min/1 73 sq  meters      POCT pregnancy, urine [[id number]]  (Normal) Resulted:  11/25/18 0033    Lab Status:  Final result Updated:  11/25/18 0033     EXT PREG TEST UR (Ref: Negative) negative    POCT urinalysis dipstick [[id number]]  (Normal) Resulted:  11/25/18 0033    Lab Status:  Final result Specimen:  Urine Updated:  11/25/18 0033    ED Urine Macroscopic [[id number]] Collected:  11/25/18 0033    Lab Status:  Final result Specimen:  Urine Updated:  11/25/18 0032     Color, UA Yellow     Clarity, UA Clear     pH, UA 6 5     Leukocytes, UA Negative     Nitrite, UA Negative     Protein, UA Negative mg/dl      Glucose, UA Negative mg/dl      Ketones, UA Negative mg/dl      Urobilinogen, UA 0 2 E U /dl      Bilirubin, UA Negative     Blood, UA Negative     Specific Gravity, UA 1 010    Narrative:       CLINITEK RESULT    CBC and differential [[id number]] Collected:  11/24/18 2354    Lab Status:  Final result Specimen:  Blood from Arm, Left Updated:  11/25/18 0005     WBC 6 47 Thousand/uL      RBC 4 47 Million/uL      Hemoglobin 12 8 g/dL      Hematocrit 38 5 %      MCV 86 fL      MCH 28 6 pg      MCHC 33 2 g/dL      RDW 13 0 %      MPV 10 1 fL      Platelets 205 Thousands/uL      nRBC 0 /100 WBCs      Neutrophils Relative 51 %      Immat GRANS % 0 %      Lymphocytes Relative 40 %      Monocytes Relative 7 %      Eosinophils Relative 2 %      Basophils Relative 0 %      Neutrophils Absolute 3 29 Thousands/µL      Immature Grans Absolute 0 01 Thousand/uL      Lymphocytes Absolute 2 58 Thousands/µL      Monocytes Absolute 0 45 Thousand/µL      Eosinophils Absolute 0 12 Thousand/µL      Basophils Absolute 0 02 Thousands/µL                  XR chest 2 views   ED Interpretation by Daniel Chopra DO (11/25 0210)   No acute pulmonary abnormality      Final Result by Adolph Mcarthur MD (11/25 1022)      No acute cardiopulmonary disease  Workstation performed: [id number]               Procedures  Procedures      Phone Consults  ED Phone Contact    ED Course               PERC Rule for PE      Most Recent Value   PERC Rule for PE   Age >=50  0 Filed at: 11/24/2018 1105   HR >=100  0 Filed at: 11/24/2018 1105   O2 Sat on room air < 95%  0 Filed at: 11/24/2018 1105   History of PE or DVT  0 Filed at: 11/24/2018 1105   Recent trauma or surgery  0 Filed at: 11/24/2018 1105   Hemoptysis  0 Filed at: 11/24/2018 1105   Exogenous estrogen  0 Filed at: 11/24/2018 1105   Unilateral leg swelling  0 Filed at: 11/24/2018 1105   [address]  Rule for PE Results  0 Filed at: 11/24/2018 1105            Patient updated on results of tests and plan of care  Discharge instructions given including avoiding exercise/sports/caffeine until further evaluation, follow-up with Cardiology and strict return precautions with understanding verbalized             MDM  CritCare Time    Disposition  Final diagnoses:   Chest pain   Abnormal ECG - epsilon wave on ECG     Time reflects when diagnosis was documented in both MDM as applicable and the Disposition within this note     Time User Action Codes Description Comment    11/25/2018  2:11 AM Vivian Ambriz Add [R07 9] Chest pain 11/25/2018  2:11 AM Mario Ambriz Add [R94 31] Abnormal ECG     11/25/2018  2:14 AM Mario Ambriz Modify [R94 31] Abnormal ECG epsilon wave on ECG      ED Disposition     ED Disposition Condition Comment    Discharge  Irene Broussard discharge to home/self care  Condition at discharge: Stable        Follow-up Information     Follow up With Specialties Details Why Contact Info Additional [address] Mike Cheema DO Cardiology  Call to schedule appointment for further evaluation of presenting symptoms and ECG findings [address] [address]       Dulce Jon MD Family Medicine  within 2-3 days for reevaluation of symptoms [address] [facility] [address]  [phone]       [address] [place] [address] Emergency Department Emergency Medicine  If symptoms worsen [address]  [phone]  ED, [address], [address]          Discharge Medication List as of 11/25/2018  2:15 AM      CONTINUE these medications which have NOT CHANGED    Details   fluticasone (FLONASE ALLERGY RELIEF) 50 mcg/act nasal spray into each nostril, Starting Wed 3/1/2017, Historical Med      ISOtretinoin (ACCUTANE) 10 MG capsule Take 30 mg by mouth 2 (two) times a day, Historical Med      Levonorgest-Eth Estrad 91-Day 0 15-0 03 &0 01 MG TABS Take 1 tablet by mouth daily, Starting Wed 10/3/2018, Normal      Multiple Vitamins-Minerals (HAIR SKIN AND NAILS FORMULA PO) Take by mouth, Starting Wed 3/1/2017, Historical Med           No discharge procedures on file  ED Provider  Attending physically available and evaluated Irene Broussard  LINA managed the patient along with the ED Attending      Electronically Signed by         Katelin Lozano DO  11/27/18 7673

## 2018-11-25 NOTE — DISCHARGE INSTRUCTIONS
Avoid excessive caffeine or exercise/sports until cleared by Cardiologist  Stay well hydrated  Call to schedule appointment with Cardiology for further evaluation of presenting symptoms and ECG findings  Return to ED if new or worsening symptoms for immediate reevaluation  Chest Pain   WHAT YOU NEED TO KNOW:   Chest pain can be caused by a range of conditions, from not serious to life-threatening  Chest pain can be a symptom of a digestive problem, such as acid reflux or a stomach ulcer  An anxiety attack or a strong emotion, such as anger, can also cause chest pain  Infection, inflammation, or a fracture in the bones or cartilage in your chest can cause pain or discomfort  Sometimes chest pain or pressure is caused by poor blood flow to your heart (angina)  Chest pain may also be caused by life-threatening conditions such as a heart attack or blood clot in your lungs  DISCHARGE INSTRUCTIONS:   Call 911 if:   · You have any of the following signs of a heart attack:      ¨ Squeezing, pressure, or pain in your chest that lasts longer than 5 minutes or returns    ¨ Discomfort or pain in your back, neck, jaw, stomach, or arm     ¨ Trouble breathing    ¨ Nausea or vomiting    ¨ Lightheadedness or a sudden cold sweat, especially with chest pain or trouble breathing    Return to the emergency department if:   · You have chest discomfort that gets worse, even with medicine  · You cough or vomit blood  · Your bowel movements are black or bloody  · You cannot stop vomiting, or it hurts to swallow  Contact your healthcare provider if:   · You have questions or concerns about your condition or care  Medicines:   · Medicines  may be given to treat the cause of your chest pain  Examples include pain medicine, anxiety medicine, or medicines to increase blood flow to your heart       · Do not take certain medicines without asking your healthcare provider first   These include NSAIDs, herbal or vitamin supplements, or hormones (estrogen or progestin)  · Take your medicine as directed  Contact your healthcare provider if you think your medicine is not helping or if you have side effects  Tell him or her if you are allergic to any medicine  Keep a list of the medicines, vitamins, and herbs you take  Include the amounts, and when and why you take them  Bring the list or the pill bottles to follow-up visits  Carry your medicine list with you in case of an emergency  Follow up with your healthcare provider within 72 hours, or as directed: You may need to return for more tests to find the cause of your chest pain  You may be referred to a specialist, such as a cardiologist or gastroenterologist  Write down your questions so you remember to ask them during your visits  Healthy living tips: The following are general healthy guidelines  If your chest pain is caused by a heart problem, your healthcare provider will give you specific guidelines to follow  · Do not smoke  Nicotine and other chemicals in cigarettes and cigars can cause lung and heart damage  Ask your healthcare provider for information if you currently smoke and need help to quit  E-cigarettes or smokeless tobacco still contain nicotine  Talk to your healthcare provider before you use these products  · Eat a variety of healthy, low-fat foods  Healthy foods include fruits, vegetables, whole-grain breads, low-fat dairy products, beans, lean meats, and fish  Ask for more information about a heart healthy diet  · Ask about activity  Your healthcare provider will tell you which activities to limit or avoid  Ask when you can drive, return to work, and have sex  Ask about the best exercise plan for you  · Maintain a healthy weight  Ask your healthcare provider how much you should weigh  Ask him or her to help you create a weight loss plan if you are overweight  · Get the flu and pneumonia vaccines    All adults should get the influenza (flu) vaccine  Get it every year as soon as it becomes available  The pneumococcal vaccine is given to adults aged 72 years or older  The vaccine is given every 5 years to prevent pneumococcal disease, such as pneumonia  © 2017 [address] [employer] Information is for End User's use only and may not be sold, redistributed or otherwise used for commercial purposes  All illustrations and images included in CareNotes® are the copyrighted property of A D A M , Inc  or [employer]  The above information is an  only  It is not intended as medical advice for individual conditions or treatments  Talk to your doctor, nurse or pharmacist before following any medical regimen to see if it is safe and effective for you

## 2018-11-26 LAB
ATRIAL RATE: 70 BPM
P AXIS: 58 DEGREES
PR INTERVAL: 188 MS
QRS AXIS: -24 DEGREES
QRSD INTERVAL: 104 MS
QT INTERVAL: 382 MS
QTC INTERVAL: 412 MS
T WAVE AXIS: 44 DEGREES
VENTRICULAR RATE: 70 BPM

## 2018-11-26 PROCEDURE — 93010 ELECTROCARDIOGRAM REPORT: CPT | Performed by: INTERNAL MEDICINE

## 2018-11-27 ENCOUNTER — APPOINTMENT (OUTPATIENT)
Dept: PHYSICAL THERAPY | Facility: CLINIC | Age: 23
End: 2018-11-27
Payer: COMMERCIAL

## 2018-11-28 ENCOUNTER — OFFICE VISIT (OUTPATIENT)
Dept: FAMILY MEDICINE CLINIC | Facility: CLINIC | Age: 23
End: 2018-11-28
Payer: COMMERCIAL

## 2018-11-28 VITALS
HEART RATE: 64 BPM | WEIGHT: 131 LBS | DIASTOLIC BLOOD PRESSURE: 56 MMHG | HEIGHT: 69 IN | BODY MASS INDEX: 19.4 KG/M2 | SYSTOLIC BLOOD PRESSURE: 100 MMHG

## 2018-11-28 DIAGNOSIS — F41.1 GENERALIZED ANXIETY DISORDER: ICD-10-CM

## 2018-11-28 DIAGNOSIS — R94.31 ABNORMAL EKG: ICD-10-CM

## 2018-11-28 DIAGNOSIS — R07.9 CHEST PAIN, UNSPECIFIED TYPE: Primary | ICD-10-CM

## 2018-11-28 DIAGNOSIS — I51.7 LEFT ATRIAL ENLARGEMENT: ICD-10-CM
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
## 2018-11-28 PROCEDURE — 1036F TOBACCO NON-USER: CPT | Performed by: FAMILY MEDICINE

## 2018-11-28 PROCEDURE — 93000 ELECTROCARDIOGRAM COMPLETE: CPT | Performed by: FAMILY MEDICINE

## 2018-11-28 PROCEDURE — 99214 OFFICE O/P EST MOD 30 MIN: CPT | Performed by: FAMILY MEDICINE

## 2018-11-28 PROCEDURE — 3008F BODY MASS INDEX DOCD: CPT | Performed by: FAMILY MEDICINE

## 2018-11-28 RX ORDER — SODIUM FLUORIDE 1.1 G/100G
GEL, DENTIFRICE ORAL
Refills: 0 | COMMUNITY
Start: 2018-08-23 | End: 2019-04-09

## 2018-11-28 RX ORDER — CETIRIZINE HYDROCHLORIDE 10 MG/1
10 TABLET ORAL DAILY
COMMUNITY
End: 2019-10-14

## 2018-11-28 NOTE — PATIENT INSTRUCTIONS
Anxiety   WHAT YOU SHOULD KNOW:   Anxiety is a condition that causes you to feel excessive worry, uneasiness, or fear  Family or work stress, smoking, caffeine, and alcohol can increase your risk for anxiety  Certain medicines or health conditions can also increase your risk  Anxiety may begin gradually, and can become a long-term condition if it is not managed or treated  AFTER YOU LEAVE:   Medicines:   · Medicines  can help you feel more calm and relaxed, and decrease your symptoms  · Take your medicine as directed  Contact your healthcare provider if you think your medicine is not helping or if you have side effects  Tell him if you are allergic to any medicine  Keep a list of the medicines, vitamins, and herbs you take  Include the amounts, and when and why you take them  Bring the list or the pill bottles to follow-up visits  Carry your medicine list with you in case of an emergency  Follow up with your healthcare provider within 2 weeks or as directed:  Write down your questions so you remember to ask them during your visits  Manage anxiety:   · Go to counseling as directed  Cognitive behavioral therapy can help you understand and change how you react to events that trigger your symptoms  · Find ways to manage your symptoms  Activities such as exercise, meditation, or listening to music can help you relax  · Practice deep breathing  Breathing can change how your body reacts to stress  Focus on taking slow, deep breaths several times a day, or during an anxiety attack  Breathe in through your nose, and out through your mouth  · Avoid caffeine  Caffeine can make your symptoms worse  Avoid foods or drinks that are meant to increase your energy level  · Limit or avoid alcohol  Ask your healthcare provider if alcohol is safe for you  You may not be able to drink alcohol if you take certain anxiety or depression medicines  Limit alcohol to 1 drink per day if you are a woman   Limit alcohol to 2 drinks per day if you are a man  A drink of alcohol is 12 ounces of beer, 5 ounces of wine, or 1½ ounces of liquor  Contact your healthcare provider if:   · Your symptoms get worse or do not get better with treatment  · You think your medicine may be causing side effects  · Your anxiety keeps you from doing your regular daily activities  · You have new symptoms since your last visit  · You have questions or concerns about your condition or care  Seek care immediately or call 911 if:   · You have chest pain, tightness, or heaviness that may spread to your shoulders, arms, jaw, neck, or back  · You feel like hurting yourself or someone else  · You feel dizzy, lightheaded, or faint  © 2014 [address] is for End User's use only and may not be sold, redistributed or otherwise used for commercial purposes  All illustrations and images included in CareNotes® are the copyrighted property of A D A M , Inc  or [employer]var[employer]  The above information is an  only  It is not intended as medical advice for individual conditions or treatments  Talk to your doctor, nurse or pharmacist before following any medical regimen to see if it is safe and effective for you

## 2018-11-28 NOTE — PROGRESS NOTES
Assessment/Plan:    Chest pain  I think it is all anxiety related, EKG today in the office normal sinus rhythm, with no changes, patient to try deep breathing exercises, patient try over-the-counter antacid medication  To return to the office if not better  Abnormal EKG  Repeat EKG in the office was normal sinus rhythm  Echocardiogram to be done for questionable left atrial enlargement that was seen on the initial EKG  Generalized anxiety disorder  Patient think it is out of control, although no specific trigger, advised patient to follow up with psychotherapy, to return to the office if not better to consider pharmacotherapy  Diagnoses and all orders for this visit:    Chest pain, unspecified type  -     POCT ECG    Abnormal EKG  -     Echo complete with contrast if indicated; Future  -     POCT ECG    Generalized anxiety disorder  -     POCT ECG    Left atrial enlargement  -     Echo complete with contrast if indicated; Future  -     POCT ECG    Other orders  -     DENTA 5000 PLUS 1 1 % CREA; USE A PEA SIZED AMOUNT AT BEDTIME  DO NOT RINSE, EAT OR DRINK FOR 30MINS  -     cetirizine (ZyrTEC) 10 mg tablet; Take 10 mg by mouth daily          Subjective: patient is here to f/u to an ER visit (at [facility]) on 11/24/18 for chest pain  Everything came back normal ie EKG, blood work , CXR  Patient states she still has intermittent symptoms throughout her day, then resolves  ak     Patient ID: Oma Landry is a 25 y o  female  Patient is here for after her recent emergency room visit for chest pain, patient all of a sudden had chest pain substernal does not ready, patient went to the emergency room had an EKG which showed left atrial enlargement, incomplete right bundle branch block, chest x-ray was negative cardiac workup was negative    Patient was discharged home, patient continued to have intermittent chest pain mainly substernal does not radiate 1/10 currently no fever no chills no coughing episodes, no shortness of breath  The following portions of the patient's history were reviewed and updated as appropriate: allergies, current medications, past family history, past medical history, past social history, past surgical history and problem list     Review of Systems   Constitutional: Negative for activity change, appetite change, chills, diaphoresis, fatigue and fever  HENT: Negative for congestion, postnasal drip, sinus pressure, sore throat and voice change  Eyes: Negative for pain, redness and visual disturbance  Respiratory: Positive for chest tightness  Negative for apnea, cough, shortness of breath and wheezing  Cardiovascular: Positive for chest pain  Negative for palpitations and leg swelling  Gastrointestinal: Negative for abdominal pain, constipation, diarrhea and nausea  Endocrine: Negative for cold intolerance, heat intolerance and polyuria  Genitourinary: Negative for dysuria, enuresis, flank pain and frequency  Musculoskeletal: Negative for gait problem, joint swelling and neck pain  Skin: Negative for color change and wound  Neurological: Negative for dizziness, syncope, speech difficulty, numbness and headaches  Psychiatric/Behavioral: Negative for behavioral problems and confusion  The patient is not nervous/anxious  Objective:    /56   Pulse 64   Ht 5' 9" (1 753 m)   Wt 59 4 kg (131 lb)   LMP 10/28/2018 (Approximate)   Breastfeeding? No   BMI 19 35 kg/m²      Physical Exam   Constitutional: She is oriented to person, place, and time  She appears well-developed and well-nourished  HENT:   Head: Normocephalic and atraumatic  Eyes: Pupils are equal, round, and reactive to light  Conjunctivae and EOM are normal    Neck: Normal range of motion  Neck supple  Cardiovascular: Normal rate, regular rhythm, normal heart sounds and intact distal pulses  Pulmonary/Chest: Effort normal and breath sounds normal    Abdominal: Soft   Bowel sounds are normal    Musculoskeletal: Normal range of motion  Neurological: She is alert and oriented to person, place, and time  She has normal reflexes  Skin: Skin is warm and dry  Psychiatric: She has a normal mood and affect  Her behavior is normal    Nursing note and vitals reviewed

## 2018-11-28 NOTE — ASSESSMENT & PLAN NOTE
Repeat EKG in the office was normal sinus rhythm  Echocardiogram to be done for questionable left atrial enlargement that was seen on the initial EKG

## 2018-11-28 NOTE — ASSESSMENT & PLAN NOTE
Patient think it is out of control, although no specific trigger, advised patient to follow up with psychotherapy, to return to the office if not better to consider pharmacotherapy

## 2018-11-28 NOTE — ASSESSMENT & PLAN NOTE
I think it is all anxiety related, EKG today in the office normal sinus rhythm, with no changes, patient to try deep breathing exercises, patient try over-the-counter antacid medication  To return to the office if not better

## 2018-11-29 ENCOUNTER — OFFICE VISIT (OUTPATIENT)
Dept: PHYSICAL THERAPY | Facility: CLINIC | Age: 23
End: 2018-11-29
Payer: COMMERCIAL

## 2018-11-29 DIAGNOSIS — G89.29 CHRONIC BILATERAL LOW BACK PAIN WITHOUT SCIATICA: Primary | ICD-10-CM

## 2018-11-29 DIAGNOSIS — M54.50 CHRONIC BILATERAL LOW BACK PAIN WITHOUT SCIATICA: Primary | ICD-10-CM

## 2018-11-29 PROCEDURE — 97112 NEUROMUSCULAR REEDUCATION: CPT | Performed by: PHYSICAL THERAPIST

## 2018-11-29 PROCEDURE — 97110 THERAPEUTIC EXERCISES: CPT | Performed by: PHYSICAL THERAPIST

## 2018-11-29 NOTE — PROGRESS NOTES
Daily Note     Today's date: 2018  Patient name: Ayaz Zelaya  : 1995  MRN: [id number]  Referring provider: Kavita Swanson  Dx:   Encounter Diagnosis     ICD-10-CM    1  Chronic bilateral low back pain without sciatica M54 5     G89 29                   Subjective: Pt reports she has not been great doing her HEP bc she's been sick but she's feeling better day by day with her back pain  Objective: See treatment diary below      Assessment: Tolerated treatment fair  Patient was fatigued with exercises due to having a cold and coughing consistently  Plan: Continue per plan of care       Precautions: none     Daily Treatment Diary         Manuals                                                                                                                Exercise Diary                        Elliptical w/ AB    5'  6'  6'               Hamstring str w/ strap 30"x3  30"x3  30"x3  30"x3               Piriformis str 30"x3  30"x3  30"x3  30"x3               AB 5"x10  5"x10  5"x10  5"x20               AB w/ bridge & hip add    5"x20  5"x20    5"x20               AB w/ bridge & hip abd    5"x20  5"x20  GTB  5"x20 GTB               AB / march 10x  20x  20x  w/ kick out 20x               Prone/quad Alt UE/LE    10x  10x  10x               Prone hip ext ke, kf    10x  each  2x10  each  20x ea               Fire Hydrant + Donkey Kick      10x  10x               Squats over mat    10x  10x  10x               Deadlift w/ tband      10x  GTB  10x GTB                                                                                                                                                                                                                                                               Modalities

## 2018-12-10 ENCOUNTER — APPOINTMENT (OUTPATIENT)
Dept: PHYSICAL THERAPY | Facility: CLINIC | Age: 23
End: 2018-12-10
Payer: COMMERCIAL

## 2018-12-17 ENCOUNTER — OFFICE VISIT (OUTPATIENT)
Dept: URGENT CARE | Facility: CLINIC | Age: 23
End: 2018-12-17
Payer: COMMERCIAL

## 2018-12-17 VITALS
HEART RATE: 74 BPM | RESPIRATION RATE: 16 BRPM | OXYGEN SATURATION: 99 % | SYSTOLIC BLOOD PRESSURE: 104 MMHG | TEMPERATURE: 99.5 F | BODY MASS INDEX: 18.51 KG/M2 | DIASTOLIC BLOOD PRESSURE: 74 MMHG | HEIGHT: 69 IN | WEIGHT: 125 LBS

## 2018-12-17 DIAGNOSIS — S46.912A MUSCLE STRAIN OF LEFT UPPER EXTREMITY, INITIAL ENCOUNTER: Primary | ICD-10-CM

## 2018-12-17 PROCEDURE — 99213 OFFICE O/P EST LOW 20 MIN: CPT | Performed by: PHYSICIAN ASSISTANT

## 2018-12-17 PROCEDURE — S9088 SERVICES PROVIDED IN URGENT: HCPCS | Performed by: PHYSICIAN ASSISTANT

## 2018-12-17 RX ORDER — CYCLOBENZAPRINE HCL 5 MG
10 TABLET ORAL 3 TIMES DAILY PRN
Qty: 30 TABLET | Refills: 0 | Status: SHIPPED | OUTPATIENT
Start: 2018-12-17 | End: 2019-04-06

## 2018-12-17 NOTE — PROGRESS NOTES
[facility]        NAME: Jocy Schmidt is a 25 y o  female  : 1995    MRN: [id number]  DATE: 2018  TIME: 3:26 PM    Assessment and Plan   Muscle strain of left upper extremity, initial encounter [S44 402A]  1  Muscle strain of left upper extremity, initial encounter  cyclobenzaprine (FLEXERIL) 5 mg tablet         Patient Instructions     Take ibuprofen 400 mg every 4 hours or 600 mg every 6 hours  Add in tylenol as needed for additional pain relief  Flexeril 1-2 tabs up to 3 times daily as needed for pain  Alternate ice and moist heat for 20-30 minutes 3-4 times daily  Gentle stretching as tolerated  Follow up with your PCP and PT for persistent symptoms  Follow up with PCP in 3-5 days  Proceed to  ER if symptoms worsen  Chief Complaint     Chief Complaint   Patient presents with    Arm Pain     left upper extremity ache began today         History of Present Illness       This is a 25year old female presenting for LUE pain x 1 day  She reports that yesterday she was in [place] carrying a heavy bag all day, did not have pain  She began with pain today at work  She denies any trauma or injury  She reports that she is having pain to the left side of her neck, left shoulder, and radiating down the left arm  Right arm and neck is also slightly sore but the left is worse  She denies any swelling, bruising, numbness, tingling, weakness  The pain is 5/10 at rest and worsens to 6-7/10 when she leans on her elbow  She has full range of motion without issue  No previous issues with this arm  She has not tried anything for her symptoms  No shortness of breath, jaw pain, pain worse with exertion, chest pain, nausea, vomiting  Review of Systems   Review of Systems   Constitutional: Negative for fatigue and fever  Respiratory: Negative for shortness of breath  Cardiovascular: Negative for chest pain  Musculoskeletal: Positive for arthralgias, myalgias and neck pain   Negative for back pain, gait problem and joint swelling  Skin: Negative for wound  Neurological: Negative for weakness and numbness  Current Medications       Current Outpatient Prescriptions:     cetirizine (ZyrTEC) 10 mg tablet, Take 10 mg by mouth daily, Disp: , Rfl:     cyclobenzaprine (FLEXERIL) 5 mg tablet, Take 2 tablets (10 mg total) by mouth 3 (three) times a day as needed for muscle spasms, Disp: 30 tablet, Rfl: 0    DENTA 5000 PLUS 1 1 % CREA, USE A PEA SIZED AMOUNT AT BEDTIME  DO NOT RINSE, EAT OR DRINK FOR 30MINS, Disp: , Rfl: 0    fluticasone (FLONASE ALLERGY RELIEF) 50 mcg/act nasal spray, into each nostril, Disp: , Rfl:     ISOtretinoin (ACCUTANE) 10 MG capsule, Take 30 mg by mouth 2 (two) times a day, Disp: , Rfl:     Levonorgest-Eth Estrad 91-Day 0 15-0 03 &0 01 MG TABS, Take 1 tablet by mouth daily, Disp: 91 each, Rfl: 0    Multiple Vitamins-Minerals (HAIR SKIN AND NAILS FORMULA PO), Take by mouth, Disp: , Rfl:     Current Allergies     Allergies as of 12/17/2018 - Reviewed 12/17/2018   Allergen Reaction Noted    Lexapro [escitalopram]  10/02/2018            The following portions of the patient's history were reviewed and updated as appropriate: allergies, current medications, past family history, past medical history, past social history, past surgical history and problem list      Past Medical History:   Diagnosis Date    Fracture of pelvis ([facility] [address] )     Last Assessed:  3/1/17    Jaw fracture ([facility] [address] )     Last Assessed:  3/1/17       No past surgical history on file  Family History   Problem Relation Age of Onset    Coronary artery disease Mother     Hyperlipidemia Mother     Hypothyroidism Mother     Diabetes Maternal Grandfather          Medications have been verified          Objective   /74   Pulse 74   Temp 99 5 °F (37 5 °C) (Tympanic)   Resp 16   Ht 5' 9" (1 753 m)   Wt 56 7 kg (125 lb)   SpO2 99%   BMI 18 46 kg/m²        Physical Exam     Physical Exam Constitutional: She appears well-developed and well-nourished  No distress  HENT:   Head: Normocephalic and atraumatic  Nose: Nose normal    Cardiovascular: Normal rate, regular rhythm and normal heart sounds  Pulmonary/Chest: Effort normal and breath sounds normal  No respiratory distress  She has no wheezes  She has no rales  Musculoskeletal:   There is mild pain elicited from palpation of the trapezius muscle in the neck, muscle spasms are palpated  Pain is elicited with palpation equally throughout the shoulder, humerus, and elbow  No pain in the forearm or hand  FAROM of the shoulder, elbow, and neck without pain  5/5 strength b/l UE, negative empty can test  3+ radial and ulnar pulses bilaterally, sensation intact  Neurological: She is alert  Skin: Skin is warm and dry  She is not diaphoretic  Nursing note and vitals reviewed

## 2018-12-18 ENCOUNTER — APPOINTMENT (OUTPATIENT)
Dept: PHYSICAL THERAPY | Facility: CLINIC | Age: 23
End: 2018-12-18
Payer: COMMERCIAL

## 2018-12-26 ENCOUNTER — HOSPITAL ENCOUNTER (OUTPATIENT)
Dept: NON INVASIVE DIAGNOSTICS | Facility: HOSPITAL | Age: 23
Discharge: HOME | End: 2018-12-26
Payer: COMMERCIAL

## 2018-12-26 DIAGNOSIS — I51.7 LEFT ATRIAL ENLARGEMENT: ICD-10-CM

## 2018-12-26 DIAGNOSIS — R94.31 ABNORMAL EKG: ICD-10-CM

## 2018-12-26 PROCEDURE — 93306 TTE W/DOPPLER COMPLETE: CPT | Performed by: INTERNAL MEDICINE

## 2018-12-26 PROCEDURE — 93306 TTE W/DOPPLER COMPLETE: CPT

## 2018-12-27 ENCOUNTER — OFFICE VISIT (OUTPATIENT)
Dept: PHYSICAL THERAPY | Facility: CLINIC | Age: 23
End: 2018-12-27
Payer: COMMERCIAL

## 2018-12-27 DIAGNOSIS — G89.29 CHRONIC BILATERAL LOW BACK PAIN WITHOUT SCIATICA: Primary | ICD-10-CM

## 2018-12-27 DIAGNOSIS — M54.50 CHRONIC BILATERAL LOW BACK PAIN WITHOUT SCIATICA: Primary | ICD-10-CM

## 2018-12-27 PROCEDURE — 97112 NEUROMUSCULAR REEDUCATION: CPT

## 2018-12-27 PROCEDURE — 97110 THERAPEUTIC EXERCISES: CPT

## 2018-12-27 PROCEDURE — G8991 OTHER PT/OT GOAL STATUS: HCPCS

## 2018-12-27 PROCEDURE — G8990 OTHER PT/OT CURRENT STATUS: HCPCS

## 2018-12-27 NOTE — PROGRESS NOTES
Daily Note     Today's date: 2018  Patient name: Ivelisse Rodriguez  : 1995  MRN: [id number]  Referring provider: Leticia Forde  Dx:   Encounter Diagnosis     ICD-10-CM    1  Chronic bilateral low back pain without sciatica M54 5     G89 29                   Subjective: Pt reports she her LB has been feeling about the same  Rates LBP level 3/10 upon arrival to therapy, adding it varies 0-3/10  Objective: See treatment diary below      Assessment: Performed exercise program w/o increasing symptoms  Some fatigue after same  Will monitor  Plan: Continue per plan of care       Precautions: none     Daily Treatment Diary         Manuals                                                                                                              Exercise Diary                        Elliptical w/ AB    5'  6'  6'  6'             Hamstring str w/ strap 30"x3  30"x3  30"x3  30"x3  30"x3             Piriformis str 30"x3  30"x3  30"x3  30"x3  30"x3             AB 5"x10  5"x10  5"x10  5"x20  5"x20             AB w/ bridge & hip add    5"x20  5"x20    5"x20  5"x20             AB w/ bridge & hip abd    5"x20  5"x20  GTB  5"x20 GTB  5"x20  GTB             AB / march 10x  20x  20x  w/ kick out 20x  w/  Kick  Out  20x             Prone/quad Alt UE/LE    10x  10x  10x  10x             Prone hip ext ke, kf    10x  each  2x10  each  20x ea  20x  each             Fire Hydrant + Donkey Kick      10x  10x  10x             Squats over mat    10x  10x  10x               Deadlift w/ tband      10x  GTB  10x GTB                                                                                                                                                                                                                                                               Modalities

## 2019-01-03 ENCOUNTER — EVALUATION (OUTPATIENT)
Dept: PHYSICAL THERAPY | Facility: CLINIC | Age: 24
End: 2019-01-03
Payer: COMMERCIAL

## 2019-01-03 DIAGNOSIS — M54.50 CHRONIC BILATERAL LOW BACK PAIN WITHOUT SCIATICA: Primary | ICD-10-CM

## 2019-01-03 DIAGNOSIS — G89.29 CHRONIC BILATERAL LOW BACK PAIN WITHOUT SCIATICA: Primary | ICD-10-CM

## 2019-01-03 PROCEDURE — 97110 THERAPEUTIC EXERCISES: CPT | Performed by: PHYSICAL THERAPIST

## 2019-01-03 PROCEDURE — 97112 NEUROMUSCULAR REEDUCATION: CPT | Performed by: PHYSICAL THERAPIST

## 2019-01-03 NOTE — PROGRESS NOTES
PT Re-Evaluation     Today's date: 1/3/19  Patient name: Sanjuanita Arevalo  : 1995  MRN: [id number]  Referring provider: Daria Garcia  Dx:   Encounter Diagnosis     ICD-10-CM    1  Chronic bilateral low back pain without sciatica M54 5 Ambulatory referral to Physical Therapy    G89 29          Assessment  Functional limitations: limited sitting tolerance, limited lifting, decreased recreation tolerance  Assessment details: Pt demonstrates improvements in flexibility and core stability since beginning PT but continues to report the above functional limitations and frequent low back stiffness  She remains a good candidate for outpatient PT to address continued limitations in glute strength and activity tolerance  She tolerated progression of core stability program without complaint today  Barriers to therapy: High copay; to schedule PT 1x/week      Goals  STG - 3 weeks  1  Independent with HEP - met  2  Maintain neutral posture in sitting without cueing - not met  3  Obtain bilateral orthotics for overpronation  - not met    LTG - 4-6 weeks  1  Normalize lower abdominal and B hip extensor strength to indicate improved core stability  - progressing  2  Tolerate sitting for at least 30 min without onset of pain to allow resuming PLOF  - met  3  Normalize BLE flexibility to promote good body mechanics during daily tasks  - met  4  Improve FOTO score to reflect improved functional activity tolerance  - met      Plan  Patient would benefit from: skilled physical therapy  Frequency: 1x week  Duration in weeks: 4        Subjective Evaluation    Pt reports she has not noticed much of a difference since beginning PT and feels the exercises are not challenging  She would like to continue with PT and progress the program if possible      Pain  Current pain ratin  At best pain ratin  At worst pain rating: 3  Location: low back  Quality: dull ache        Objective     Postural Observations  Seated posture: poor  Standing posture: fair  Correction of posture: has no consistent effect    Additional Postural Observation Details  Overpronation bilaterally L>>R    Tenderness     Additional Tenderness Details  No distinct TTP throughout lumbar spine    Active Range of Motion     Lumbar   Normal active range of motion    Passive Range of Motion     Additional Passive Range of Motion Details  Piriformis and Hamstrings B = WNL    Strength/Myotome Testing     Left Hip   Planes of Motion   Flexion: 5  Extension: 4  Abduction: 5  External rotation: 5    Right Hip   Planes of Motion   Flexion: 5  Extension: 4  Abduction: 5  External rotation: 5      Muscle Activation     Additional Muscle Activation Details Fair TA activation    Precautions: none     Daily Treatment Diary         Manuals 11/5  11/13  11/20  11/29  12/27  1/3                                                                                                           Exercise Diary                        Elliptical w/ AB    5'  6'  6'  6'  NV           Hamstring str w/ strap 30"x3  30"x3  30"x3  30"x3  30"x3  HEP           Piriformis str 30"x3  30"x3  30"x3  30"x3  30"x3  HEP           AB 5"x10  5"x10  5"x10  5"x20  5"x20  HEP           AB w/ bridge & hip add    5"x20  5"x20    5"x20  5"x20  HEP           AB w/ bridge & hip abd    5"x20  5"x20  GTB  5"x20 GTB  5"x20  GTB  HEP           AB w/ march 10x  20x  20x  w/ kick out 20x  w/  Kick  Out  20x  w/ kick out  20x           Prone/quad Alt UE/LE    10x  10x  10x  10x  20x ea 2 5#           Prone hip ext ke, kf    10x  each  2x10  each  20x ea  20x  each  20x ea 2 5#           Fire Hydrant + Donkey Kick      10x  10x  10x  20x 2 5#           Squats over mat    10x  10x  10x    20x BOSU           Deadlift w/ tband      10x  GTB  10x GTB    20x 5#db            Row on BOSU            20x red            B ext on BOSU            20x red            Trunk rot on BOSU            10x red            Step Up w/ opp lat pull down            20x red                                                                                                                                                           Modalities

## 2019-01-08 DIAGNOSIS — Z30.41 ENCOUNTER FOR SURVEILLANCE OF CONTRACEPTIVE PILLS: ICD-10-CM

## 2019-01-09 RX ORDER — LEVONORGESTREL AND ETHINYL ESTRADIOL 150-30(84)
KIT ORAL
Qty: 28 EACH | Refills: 0 | Status: SHIPPED | OUTPATIENT
Start: 2019-01-09 | End: 2019-04-17 | Stop reason: SDUPTHER

## 2019-01-15 ENCOUNTER — OFFICE VISIT (OUTPATIENT)
Dept: PHYSICAL THERAPY | Facility: CLINIC | Age: 24
End: 2019-01-15
Payer: COMMERCIAL

## 2019-01-15 DIAGNOSIS — G89.29 CHRONIC BILATERAL LOW BACK PAIN WITHOUT SCIATICA: Primary | ICD-10-CM

## 2019-01-15 DIAGNOSIS — M54.50 CHRONIC BILATERAL LOW BACK PAIN WITHOUT SCIATICA: Primary | ICD-10-CM

## 2019-01-15 PROCEDURE — 97112 NEUROMUSCULAR REEDUCATION: CPT

## 2019-01-15 NOTE — PROGRESS NOTES
Daily Note     Today's date: 1/15/2019  Patient name: Bambi Meraz  : 1995  MRN: [id number]  Referring provider: Sena Mauricio  Dx:   Encounter Diagnosis     ICD-10-CM    1  Chronic bilateral low back pain without sciatica M54 5     G89 29                   Subjective: Pt reports she was just a little sore after LV  Objective: See treatment diary below      Assessment: Performed exercise program w/o complaint  Required vc'ing throughout same  Fatigue after tx  Will monitor  Patient would benefit from continued PT      Plan: Continue per plan of care         Precautions: none     Daily Treatment Diary         Manuals 11/5  11/13  11/20  11/29  12/27  1/3  1/15                                                                                                         Exercise Diary                        Elliptical w/ AB    5'  6'  6'  6'  NV  6'         Hamstring str w/ strap 30"x3  30"x3  30"x3  30"x3  30"x3  HEP  D/C         Piriformis str 30"x3  30"x3  30"x3  30"x3  30"x3  HEP  D/C         AB 5"x10  5"x10  5"x10  5"x20  5"x20  HEP  D/C         AB w/ bridge & hip add    5"x20  5"x20     5"x20  5"x20  HEP  D/C         AB w/ bridge & hip abd    5"x20  5"x20  GTB  5"x20 GTB  5"x20  GTB  HEP  D/C         AB w/ march 10x  20x  20x  w/ kick out 20x  w/  Kick  Out  20x  w/ kick out  20x  w/kick out  20x         Prone/quad Alt UE/LE    10x  10x  10x  10x  20x ea 2 5#  2x10  ea3#         Prone hip ext ke, kf    10x  each  2x10  each  20x ea  20x  each  20x ea 2 5#  20x ea3#         Fire Hydrant + Donkey Kick      10x  10x  10x  20x 2 5#  2x10R, 10/5L  3#         Squats over mat    10x  10x  10x    20x BOSU  20x  BOSU         Deadlift w/ tband      10x  GTB  10x GTB    20x 5#db  20x  5#db          Row on BOSU            20x red  20x  red          B ext on BOSU            20x red  20x  red          Trunk rot on BOSU            10x red  10x          Step Up w/ opp lat pull down            20x red  6"  20x  red                                                                                                                                                         Modalities

## 2019-01-21 ENCOUNTER — OFFICE VISIT (OUTPATIENT)
Dept: PHYSICAL THERAPY | Facility: CLINIC | Age: 24
End: 2019-01-21
Payer: COMMERCIAL

## 2019-01-21 DIAGNOSIS — G89.29 CHRONIC BILATERAL LOW BACK PAIN WITHOUT SCIATICA: Primary | ICD-10-CM

## 2019-01-21 DIAGNOSIS — M54.50 CHRONIC BILATERAL LOW BACK PAIN WITHOUT SCIATICA: Primary | ICD-10-CM

## 2019-01-21 PROCEDURE — 97110 THERAPEUTIC EXERCISES: CPT | Performed by: PHYSICAL THERAPIST

## 2019-01-21 PROCEDURE — 97112 NEUROMUSCULAR REEDUCATION: CPT | Performed by: PHYSICAL THERAPIST

## 2019-01-21 NOTE — PROGRESS NOTES
Daily Note     Today's date: 2019  Patient name: Love Couch  : 1995  MRN: [id number]  Referring provider: Breanna Page  Dx:   Encounter Diagnosis     ICD-10-CM    1  Chronic bilateral low back pain without sciatica M54 5     G89 29                   Subjective: Pt reports she notices no changes  Objective: See treatment diary below      Assessment: Tolerated treatment well  Patient demonstrated fatigue post treatment      Plan: Continue per plan of care       Precautions: none     Daily Treatment Diary         Manuals 11/5  11/13  11/20  11/29  12/27  1/3  1/15  1/21                                                                                                       Exercise Diary                        Elliptical w/ AB    5'  6'  6'  6'  NV  6'  8'       Hamstring str w/ strap 30"x3  30"x3  30"x3  30"x3  30"x3  HEP  D/C         Piriformis str 30"x3  30"x3  30"x3  30"x3  30"x3  HEP  D/C         AB 5"x10  5"x10  5"x10  5"x20  5"x20  HEP  D/C         AB w/ bridge & hip add    5"x20  5"x20     5"x20  5"x20  HEP  D/C         AB w/ bridge & hip abd    5"x20  5"x20  GTB  5"x20 GTB  5"x20  GTB  HEP  D/C         AB / march 10x  20x  20x  w/ kick out 20x  w/  Kick  Out  20x  w/ kick out  20x  w/kick out  20x  w/ kick out 20x       Prone/quad Alt UE/LE    10x  10x  10x  10x  20x ea 2 5#  2x10  ea3#  20x ea 3#       Prone hip ext ke, kf    10x  each  2x10  each  20x ea  20x  each  20x ea 2 5#  20x ea3#  20x ea 3#       Fire Hydrant + Donkey Kick      10x  10x  10x  20x 2 5#  2x10R, 10/5L  3#  2x10 3#       Squats over mat    10x  10x  10x    20x BOSU  20x  BOSU  20x BOSU       Deadlift w/ tband      10x  GTB  10x GTB    20x 5#db  20x  5#db  20x 8# db        Row on BOSU            20x red  20x  red  20x grn        B ext on BOSU            20x red  20x  red  20x grn        Trunk rot on BOSU            10x red  10x  10x grn        Step Up w/ opp lat pull down            20x red  6"  20x  red  8" 20x grn                                                                                                                                                       Modalities

## 2019-01-28 ENCOUNTER — APPOINTMENT (OUTPATIENT)
Dept: PHYSICAL THERAPY | Facility: CLINIC | Age: 24
End: 2019-01-28
Payer: COMMERCIAL

## 2019-01-31 ENCOUNTER — OFFICE VISIT (OUTPATIENT)
Dept: FAMILY MEDICINE CLINIC | Facility: CLINIC | Age: 24
End: 2019-01-31
Payer: COMMERCIAL

## 2019-01-31 ENCOUNTER — APPOINTMENT (OUTPATIENT)
Dept: PHYSICAL THERAPY | Facility: CLINIC | Age: 24
End: 2019-01-31
Payer: COMMERCIAL

## 2019-01-31 VITALS
DIASTOLIC BLOOD PRESSURE: 54 MMHG | WEIGHT: 129 LBS | SYSTOLIC BLOOD PRESSURE: 94 MMHG | BODY MASS INDEX: 19.11 KG/M2 | HEART RATE: 88 BPM | HEIGHT: 69 IN

## 2019-01-31 DIAGNOSIS — M62.838 MUSCLE SPASM: Primary | ICD-10-CM

## 2019-01-31 DIAGNOSIS — M79.10 MYALGIA: ICD-10-CM

## 2019-01-31 DIAGNOSIS — G47.00 INSOMNIA, UNSPECIFIED TYPE: ICD-10-CM

## 2019-01-31 DIAGNOSIS — R51.9 INTRACTABLE HEADACHE, UNSPECIFIED CHRONICITY PATTERN, UNSPECIFIED HEADACHE TYPE: ICD-10-CM

## 2019-01-31 PROCEDURE — 99214 OFFICE O/P EST MOD 30 MIN: CPT | Performed by: PHYSICIAN ASSISTANT

## 2019-01-31 RX ORDER — TRAZODONE HYDROCHLORIDE 50 MG/1
50 TABLET ORAL
Qty: 30 TABLET | Refills: 1 | Status: SHIPPED | OUTPATIENT
Start: 2019-01-31 | End: 2019-02-25 | Stop reason: SDUPTHER

## 2019-01-31 RX ORDER — ISOTRETINOIN 30 MG/1
CAPSULE, LIQUID FILLED ORAL
Refills: 0 | COMMUNITY
Start: 2019-01-19 | End: 2019-04-06

## 2019-01-31 RX ORDER — TRIAMCINOLONE ACETONIDE 1 MG/G
CREAM TOPICAL
Refills: 0 | COMMUNITY
Start: 2018-12-04

## 2019-01-31 NOTE — PROGRESS NOTES
Assessment/Plan:  Patient Instructions   1  Myalgia and muscle spasm-possible side effect of Accutane but I would recommend assessing complete blood work to rule out other conditions are electrolyte abnormalities  2  Headaches-again possible side effect of medication but will assess further labs  3  Insomnia-discussed medication therapies with patient and will try trazodone 50 mg at bedtime  This may help with her anxiety symptoms a little bit as well  Follow-up in the next month as necessary  Diagnoses and all orders for this visit:    Muscle spasm  -     CBC and differential  -     Comprehensive metabolic panel  -     TSH, 3rd generation with Free T4 reflex  -     Lyme Antibody Profile with reflex to WB  -     HO Screen w/ Reflex to Titer/Pattern  -     Sedimentation rate, automated  -     RF Screen w/ Reflex to Titer  -     Vitamin D 25 hydroxy  -     CK (with reflex to MB); Future    Myalgia  -     CBC and differential  -     Comprehensive metabolic panel  -     TSH, 3rd generation with Free T4 reflex  -     Lyme Antibody Profile with reflex to WB  -     HO Screen w/ Reflex to Titer/Pattern  -     Sedimentation rate, automated  -     RF Screen w/ Reflex to Titer  -     Vitamin D 25 hydroxy  -     CK (with reflex to MB); Future    Intractable headache, unspecified chronicity pattern, unspecified headache type  -     CBC and differential  -     Comprehensive metabolic panel  -     TSH, 3rd generation with Free T4 reflex  -     Lyme Antibody Profile with reflex to WB  -     HO Screen w/ Reflex to Titer/Pattern  -     Sedimentation rate, automated  -     RF Screen w/ Reflex to Titer  -     Vitamin D 25 hydroxy  -     CK (with reflex to MB); Future    Insomnia, unspecified type  -     traZODone (DESYREL) 50 mg tablet;  Take 1 tablet (50 mg total) by mouth daily at bedtime    Other orders  -     MYORISAN 30 MG capsule; TAKE ONE CAPSULE BY MOUTH TWICE A DAY W/ FATTY FOOD OR WHOLE MILK  -     triamcinolone (KENALOG) 0 1 % cream; APPLY SMALL AMOUNT TWICE A DAY X 2 WEEKS TO THE B/L HANDS AND ELBOW THEN AS NEEDED FOR FLARES          Subjective:   C/o muscle spasms in multiple areas of her body including lower legs, back and arm  Also gets intense headaches  Went to an Urgent Care a couple weeks ago  mjs     Patient ID: Sanjuanita Arevalo is a 21 y o  female  HPI:  This is a 45-year-old female who presents to the office with onset of some muscle cramps and spasms over the past few weeks  She is wondering if it is related to her current use of Accutane  She has had some weird symptoms on and off since being on the medication but she only has 2 weeks left so she is trying to stick it out  She is having some cramps in the upper calf muscles bilaterally  Pain also bilateral upper torso muscles and arms  She has tried some muscle relaxers with little relief after going to the urgent care center  She has been getting regular blood test done through her dermatologist for the Accutane  She also mentions that she has been getting frequent intense headaches almost daily  Headaches are sometimes incapacitating  She has been trying over-the-counter medications as needed for this  She also mentions that she is having trouble sleeping at nighttime  This has been going on for quite some time and possibly related to anxiety  She has tried anti anxiety medications in the past such as Lexapro but felt that it did not help much with her sleep  The following portions of the patient's history were reviewed and updated as appropriate: allergies, current medications, past family history, past medical history, past social history, past surgical history and problem list     Review of Systems   Constitutional: Negative for chills, fatigue and fever  HENT: Negative for congestion, ear pain and sinus pressure  Eyes: Negative for visual disturbance  Respiratory: Negative for cough, chest tightness and shortness of breath  Cardiovascular: Negative for chest pain and palpitations  Gastrointestinal: Negative for diarrhea, nausea and vomiting  Endocrine: Negative for polyuria  Genitourinary: Negative for dysuria and frequency  Musculoskeletal: Positive for myalgias  Negative for arthralgias  Skin: Negative for pallor and rash  Neurological: Positive for headaches  Negative for dizziness, weakness, light-headedness and numbness  Psychiatric/Behavioral: Negative for agitation, behavioral problems and sleep disturbance  Trouble with sleep   All other systems reviewed and are negative  Objective:      BP 94/54   Pulse 88   Ht 5' 9" (1 753 m)   Wt 58 5 kg (129 lb)   BMI 19 05 kg/m²          Physical Exam   Constitutional: She is oriented to person, place, and time  She appears well-developed and well-nourished  No distress  HENT:   Head: Normocephalic and atraumatic  Right Ear: External ear normal    Left Ear: External ear normal    Nose: Nose normal    Mouth/Throat: Oropharynx is clear and moist  No oropharyngeal exudate  Eyes: Pupils are equal, round, and reactive to light  Conjunctivae and EOM are normal    Neck: Normal range of motion  Neck supple  No tracheal deviation present  No thyromegaly present  Cardiovascular: Normal rate, regular rhythm and normal heart sounds  Exam reveals no friction rub  No murmur heard  Pulmonary/Chest: Effort normal and breath sounds normal  No respiratory distress  She has no wheezes  She has no rales  Abdominal: Soft  Bowel sounds are normal  She exhibits no distension  There is no tenderness  There is no rebound and no guarding  Musculoskeletal: Normal range of motion  She exhibits no edema or tenderness  Lymphadenopathy:     She has no cervical adenopathy  Neurological: She is alert and oriented to person, place, and time  No cranial nerve deficit  Coordination normal    Skin: Skin is warm and dry  No rash noted  No erythema     Psychiatric: She has a normal mood and affect  Her behavior is normal  Thought content normal    Nursing note and vitals reviewed

## 2019-01-31 NOTE — PATIENT INSTRUCTIONS
1   Myalgia and muscle spasm-possible side effect of Accutane but I would recommend assessing complete blood work to rule out other conditions are electrolyte abnormalities  2  Headaches-again possible side effect of medication but will assess further labs  3  Insomnia-discussed medication therapies with patient and will try trazodone 50 mg at bedtime  This may help with her anxiety symptoms a little bit as well  Follow-up in the next month as necessary

## 2019-02-04 ENCOUNTER — TELEPHONE (OUTPATIENT)
Dept: FAMILY MEDICINE CLINIC | Facility: CLINIC | Age: 24
End: 2019-02-04

## 2019-02-04 DIAGNOSIS — Z02.0 SCHOOL PHYSICAL EXAM: Primary | ICD-10-CM

## 2019-02-04 NOTE — TELEPHONE ENCOUNTER
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

## 2019-02-13 ENCOUNTER — OFFICE VISIT (OUTPATIENT)
Dept: FAMILY MEDICINE CLINIC | Facility: CLINIC | Age: 24
End: 2019-02-13
Payer: COMMERCIAL

## 2019-02-13 VITALS
BODY MASS INDEX: 18.96 KG/M2 | TEMPERATURE: 97.4 F | HEIGHT: 69 IN | DIASTOLIC BLOOD PRESSURE: 68 MMHG | WEIGHT: 128 LBS | HEART RATE: 76 BPM | SYSTOLIC BLOOD PRESSURE: 104 MMHG

## 2019-02-13 DIAGNOSIS — R07.9 CHEST PAIN, UNSPECIFIED TYPE: Primary | ICD-10-CM

## 2019-02-13 DIAGNOSIS — R10.13 EPIGASTRIC PAIN: ICD-10-CM

## 2019-02-13 DIAGNOSIS — F41.1 GENERALIZED ANXIETY DISORDER: ICD-10-CM

## 2019-02-13 PROCEDURE — 99214 OFFICE O/P EST MOD 30 MIN: CPT | Performed by: FAMILY MEDICINE

## 2019-02-13 NOTE — ASSESSMENT & PLAN NOTE
Patient does have some anxiety  She is currently on trazodone  She failed Lexapro before secondary to side effects  Consider BuSpar verses Wellbutrin as an add on after we have labs and the stress test back, as well as the ultrasound  If all of those are negative, would consider that this is secondary to anxiety and treat as such

## 2019-02-13 NOTE — ASSESSMENT & PLAN NOTE
Patient does have a significant amount of epigastric pain by history  On exam today there is not a significant amount of findings  Check laboratory studies that were previously ordered  Recommend ultrasound abdomen to rule out any further pathology such as gallbladder, kidney stones, pancreas  She did not have any improvement with using antacids  Of note, the patient is on Accutane, and is not able to get pregnant because of that, i e  She is cautioned that she should not be pregnant and be on Accutane  She also does get tested from Dermatology

## 2019-02-13 NOTE — PROGRESS NOTES
Assessment and Plan:    Problem List Items Addressed This Visit     Chest pain - Primary     Patient does continue to have some chest pain  At this point, she had a negative echo test, and EKG  She has had blood work in the past, but at this point I would recommend that she repeat the blood work that is ordered already, and have a stress echo done  Referred for same  Relevant Orders    Echo stress test w contrast if indicated    Epigastric pain     Patient does have a significant amount of epigastric pain by history  On exam today there is not a significant amount of findings  Check laboratory studies that were previously ordered  Recommend ultrasound abdomen to rule out any further pathology such as gallbladder, kidney stones, pancreas  She did not have any improvement with using antacids  Of note, the patient is on Accutane, and is not able to get pregnant because of that, i e  She is cautioned that she should not be pregnant and be on Accutane  She also does get tested from Dermatology  Relevant Orders    US abdomen complete    Generalized anxiety disorder     Patient does have some anxiety  She is currently on trazodone  She failed Lexapro before secondary to side effects  Consider BuSpar verses Wellbutrin as an add on after we have labs and the stress test back, as well as the ultrasound  If all of those are negative, would consider that this is secondary to anxiety and treat as such  Diagnoses and all orders for this visit:    Chest pain, unspecified type  -     Echo stress test w contrast if indicated; Future    Epigastric pain  -     US abdomen complete; Future    Generalized anxiety disorder              Subjective:      Patient ID: Teresa Nath is a 21 y o  female      CC:    Chief Complaint   Patient presents with    Abdominal Pain     Onset 2 nights ago    Chest Pain     Onset 1 day ago       HPI:    2 days ago she noted some cramping in the abdomen  Came out of no where  Started after dinner  Had it a bedtime, and in the AM   It did get better later  Today is not significant  Epigastric  Cramping after 1st day, initially no changes  Minimal nausea yesterday  No vomit, or changes in bowels  CP: She has had this at times in past  Reports she has had eval before  Is a tightness and tingling on left to mid chest   Comes and goes  She is not sure what starts it, but seems to be related to stress  She will do deep breathing and relaxation, but that doesn't always help  She did try antacids, but no help  Reviewed chart, found the EKG and echo were done at the end of 2018, approximately 3 months ago  Echo was negative  EKG was normal     Denies changes in urination  The following portions of the patient's history were reviewed and updated as appropriate: allergies, current medications, past family history, past medical history, past social history, past surgical history and problem list       Review of Systems   Constitutional: Negative  HENT: Negative  Eyes: Negative  Respiratory: Negative  Cardiovascular: Positive for chest pain  Gastrointestinal: Positive for abdominal pain  Endocrine: Negative  Genitourinary: Negative  Musculoskeletal: Negative  Skin: Negative  Allergic/Immunologic: Negative  Neurological: Negative  Hematological: Negative  Psychiatric/Behavioral: Negative  Data to review:       Objective:    Vitals:    02/13/19 1538   BP: 104/68   Pulse: 76   Temp: (!) 97 4 °F (36 3 °C)   Weight: 58 1 kg (128 lb)   Height: 5' 9" (1 753 m)        Physical Exam   Constitutional: She appears well-developed and well-nourished  HENT:   Head: Normocephalic and atraumatic  Cardiovascular: Normal rate, regular rhythm and normal heart sounds  Pulses:       Carotid pulses are 2+ on the right side, and 2+ on the left side    Pulmonary/Chest: Effort normal and breath sounds normal  She has no wheezes  She has no rales  She exhibits no tenderness  Abdominal: Bowel sounds are normal  She exhibits no shifting dullness, no distension, no pulsatile liver, no fluid wave, no abdominal bruit, no ascites, no pulsatile midline mass and no mass  There is no hepatosplenomegaly  There is no tenderness  There is no rigidity, no rebound, no guarding and no CVA tenderness  Hernia confirmed negative in the ventral area  Nursing note and vitals reviewed

## 2019-02-13 NOTE — PATIENT INSTRUCTIONS
Problem List Items Addressed This Visit     Chest pain - Primary     Patient does continue to have some chest pain  At this point, she had a negative echo test, and EKG  She has had blood work in the past, but at this point I would recommend that she repeat the blood work that is ordered already, and have a stress echo done  Referred for same  Relevant Orders    Echo stress test w contrast if indicated    Epigastric pain     Patient does have a significant amount of epigastric pain by history  On exam today there is not a significant amount of findings  Check laboratory studies that were previously ordered  Recommend ultrasound abdomen to rule out any further pathology such as gallbladder, kidney stones, pancreas  She did not have any improvement with using antacids  Of note, the patient is on Accutane, and is not able to get pregnant because of that, i e  She is cautioned that she should not be pregnant and be on Accutane  She also does get tested from Dermatology  Relevant Orders    US abdomen complete    Generalized anxiety disorder     Patient does have some anxiety  She is currently on trazodone  She failed Lexapro before secondary to side effects  Consider BuSpar verses Wellbutrin as an add on after we have labs and the stress test back, as well as the ultrasound  If all of those are negative, would consider that this is secondary to anxiety and treat as such

## 2019-02-13 NOTE — ASSESSMENT & PLAN NOTE
Patient does continue to have some chest pain  At this point, she had a negative echo test, and EKG  She has had blood work in the past, but at this point I would recommend that she repeat the blood work that is ordered already, and have a stress echo done  Referred for same

## 2019-02-16 ENCOUNTER — OFFICE VISIT (OUTPATIENT)
Dept: URGENT CARE | Facility: CLINIC | Age: 24
End: 2019-02-16
Payer: COMMERCIAL

## 2019-02-16 VITALS
SYSTOLIC BLOOD PRESSURE: 100 MMHG | HEART RATE: 82 BPM | OXYGEN SATURATION: 96 % | BODY MASS INDEX: 18.78 KG/M2 | RESPIRATION RATE: 16 BRPM | WEIGHT: 126.8 LBS | HEIGHT: 69 IN | DIASTOLIC BLOOD PRESSURE: 61 MMHG | TEMPERATURE: 99.1 F

## 2019-02-16 DIAGNOSIS — H92.02 EAR PAIN, LEFT: Primary | ICD-10-CM

## 2019-02-16 PROCEDURE — 99213 OFFICE O/P EST LOW 20 MIN: CPT | Performed by: NURSE PRACTITIONER

## 2019-02-16 PROCEDURE — S9088 SERVICES PROVIDED IN URGENT: HCPCS | Performed by: NURSE PRACTITIONER

## 2019-02-16 RX ORDER — PREDNISONE 10 MG/1
TABLET ORAL
Qty: 21 TABLET | Refills: 0 | Status: SHIPPED | OUTPATIENT
Start: 2019-02-16 | End: 2019-02-21

## 2019-02-16 NOTE — PATIENT INSTRUCTIONS
Your ear does not look infected today  Take steroid as prescribed for pain and inflammation  If your your pain is not improved in 2-3 days have your ear recheck  Take Tylenol or Motrin for any discomfort

## 2019-02-16 NOTE — PROGRESS NOTES
Assessment/Plan    Ear pain, left [H92 02]  1  Ear pain, left  predniSONE 10 mg tablet         Subjective:     Patient ID: Ricco Yung is a 21 y o  female  Reason For Visit / Chief Complaint  Chief Complaint   Patient presents with    Earache     Pt reports she started with a left earache yesterday am  pain is 5/10  This is a 42-year-old female patient who presents to the urgent care today  Patient is complaining of a left earache for 24 hours  She reports the pain as 5/10  Patient denies fever, chills, shortness of breath or chest pain  Patient states she does have a little bit of a headache and feels like she may have some congestion beginning  The patient is otherwise healthy  Her medications and allergies have been reviewed  Past Medical History:   Diagnosis Date    Fracture of pelvis [facility])     Last Assessed:  3/1/17    Jaw fracture (HCC)     Last Assessed:  3/1/17       Past Surgical History:   Procedure Laterality Date    WISDOM TOOTH EXTRACTION         Family History   Problem Relation Age of Onset    Coronary artery disease Mother     Hyperlipidemia Mother     Hypothyroidism Mother     Diabetes Maternal Grandfather     Hernia Father        Review of Systems   Constitutional: Negative for chills and fever  HENT: Positive for ear pain  Negative for postnasal drip, rhinorrhea, sinus pressure, sinus pain, sore throat and trouble swallowing  Respiratory: Negative for cough, shortness of breath, wheezing and stridor  Cardiovascular: Negative for chest pain  Musculoskeletal: Negative for neck pain and neck stiffness  Skin: Negative for color change, pallor and rash  Neurological: Negative for headaches         Objective:    /61 (BP Location: Left arm, Patient Position: Sitting)   Pulse 82   Temp 99 1 °F (37 3 °C) (Tympanic)   Resp 16   Ht 5' 9" (1 753 m)   Wt 57 5 kg (126 lb 12 8 oz)   SpO2 96%   BMI 18 73 kg/m²     Physical Exam   Constitutional: She is oriented to person, place, and time  Vital signs are normal  She appears well-developed and well-nourished  HENT:   Head: Normocephalic and atraumatic  Right Ear: Hearing, tympanic membrane, external ear and ear canal normal    Left Ear: Hearing, tympanic membrane, external ear and ear canal normal  There is tenderness  Nose: Nose normal  No mucosal edema or rhinorrhea  Mouth/Throat: Uvula is midline, oropharynx is clear and moist and mucous membranes are normal  No oropharyngeal exudate  No tonsillar exudate  Neck: Normal range of motion  Neck supple  Cardiovascular: Normal rate, regular rhythm and normal heart sounds  Exam reveals no gallop and no friction rub  No murmur heard  Pulmonary/Chest: Effort normal and breath sounds normal  No stridor  No respiratory distress  She has no decreased breath sounds  She has no wheezes  She has no rhonchi  She has no rales  She exhibits no tenderness  Musculoskeletal: Normal range of motion  Neurological: She is alert and oriented to person, place, and time  Skin: Skin is warm and dry  Capillary refill takes less than 2 seconds  No rash noted  No erythema  No pallor  Psychiatric: She has a normal mood and affect

## 2019-02-21 ENCOUNTER — OFFICE VISIT (OUTPATIENT)
Dept: FAMILY MEDICINE CLINIC | Facility: CLINIC | Age: 24
End: 2019-02-21
Payer: COMMERCIAL

## 2019-02-21 VITALS
HEART RATE: 76 BPM | DIASTOLIC BLOOD PRESSURE: 64 MMHG | SYSTOLIC BLOOD PRESSURE: 108 MMHG | BODY MASS INDEX: 18.81 KG/M2 | WEIGHT: 127 LBS | HEIGHT: 69 IN

## 2019-02-21 DIAGNOSIS — F41.1 GENERALIZED ANXIETY DISORDER: Primary | ICD-10-CM

## 2019-02-21 DIAGNOSIS — M79.10 MYALGIA: ICD-10-CM

## 2019-02-21 DIAGNOSIS — R94.31 ABNORMAL EKG: ICD-10-CM

## 2019-02-21 DIAGNOSIS — H69.80 DYSFUNCTION OF EUSTACHIAN TUBE, UNSPECIFIED LATERALITY: ICD-10-CM

## 2019-02-21 DIAGNOSIS — R07.9 CHEST PAIN, UNSPECIFIED TYPE: ICD-10-CM

## 2019-02-21 DIAGNOSIS — E55.9 VITAMIN D DEFICIENCY: ICD-10-CM

## 2019-02-21 DIAGNOSIS — J30.9 ALLERGIC RHINITIS, UNSPECIFIED SEASONALITY, UNSPECIFIED TRIGGER: ICD-10-CM

## 2019-02-21 PROBLEM — H69.90 EUSTACHIAN TUBE DYSFUNCTION: Status: ACTIVE | Noted: 2019-02-21

## 2019-02-21 PROBLEM — R10.13 EPIGASTRIC PAIN: Status: RESOLVED | Noted: 2019-02-13 | Resolved: 2019-02-21

## 2019-02-21 PROCEDURE — 99214 OFFICE O/P EST MOD 30 MIN: CPT | Performed by: FAMILY MEDICINE

## 2019-02-21 PROCEDURE — 3008F BODY MASS INDEX DOCD: CPT | Performed by: FAMILY MEDICINE

## 2019-02-21 PROCEDURE — 93000 ELECTROCARDIOGRAM COMPLETE: CPT | Performed by: FAMILY MEDICINE

## 2019-02-21 RX ORDER — BUSPIRONE HYDROCHLORIDE 5 MG/1
TABLET ORAL
Qty: 60 TABLET | Refills: 3 | Status: SHIPPED | OUTPATIENT
Start: 2019-02-21 | End: 2019-02-25 | Stop reason: ALTCHOICE

## 2019-02-21 RX ORDER — AZELASTINE 1 MG/ML
2 SPRAY, METERED NASAL 2 TIMES DAILY
Qty: 30 ML | Refills: 3 | Status: SHIPPED | OUTPATIENT
Start: 2019-02-21 | End: 2019-04-09 | Stop reason: SINTOL

## 2019-02-21 NOTE — PROGRESS NOTES
Assessment and Plan:  1  Chest pain/abnormal EKG, EKG completed in office today  EKG is normal and not changed from previous in November refer to Cardiology for further evaluation  2  Vitamin-D deficiency/myalgia patient take vitamin-D 5000 units daily over-the-counter  3  Per allergic rhinitis/eustachian tube dysfunction, Astelin nasal spray prescribed  4  Anxiety, BuSpar was started  5  Patient to return end of month at scheduled appointment, if symptoms worsen report to [facility]   Problem List Items Addressed This Visit        Respiratory    Allergic rhinitis     Alejandro ordered         Relevant Medications    azelastine (ASTELIN) 0 1 % nasal spray       Nervous and Auditory    Eustachian tube dysfunction     Asthien ordered         Relevant Medications    azelastine (ASTELIN) 0 1 % nasal spray       Other    Generalized anxiety disorder - Primary    Relevant Medications    busPIRone (BUSPAR) 5 mg tablet    Chest pain     Refer to Cardiology, EKG completed in office         Relevant Orders    Ambulatory referral to Cardiology    Abnormal EKG     Refer to Cardiology         Relevant Orders    Ambulatory referral to Cardiology    Vitamin D deficiency     Start vitamin-D 5000 units daily         Myalgia     Blood work discussed start vitamin-D                  Diagnoses and all orders for this visit:    Generalized anxiety disorder  -     busPIRone (BUSPAR) 5 mg tablet;  Take 1 tablet twice daily for anxiety    Allergic rhinitis, unspecified seasonality, unspecified trigger  -     azelastine (ASTELIN) 0 1 % nasal spray; 2 sprays into each nostril 2 (two) times a day Use in each nostril as directed    Dysfunction of Eustachian tube, unspecified laterality  -     azelastine (ASTELIN) 0 1 % nasal spray; 2 sprays into each nostril 2 (two) times a day Use in each nostril as directed    Vitamin D deficiency    Myalgia    Chest pain, unspecified type  -     Ambulatory referral to Cardiology; Future    Abnormal EKG  -     Ambulatory referral to Cardiology; Future              Subjective:      Patient ID: Frank Gómez is a 21 y o  female  CC:    Chief Complaint   Patient presents with    Follow-up     to review bw results and    Chest Pain    Anxiety    Earache     left ear   mjs    HPI:    Patient is having ongoing anxiety and daily chest pain  Patient's cardiac work up-to-date was normal   Patient does have stress echocardiogram scheduled  Patient has not seen a cardiologist as of yet will refer her to Cardiology  Recent blood work was discussed  Patient's vitamin-D is low which may be causing her myalgias  Patient take vitamin-D 5000 units over-the-counter daily  Patient is having some mild otalgia sneezing clear rhinorrhea  No fever chills no shortness of breath      The following portions of the patient's history were reviewed and updated as appropriate: allergies, current medications, past family history, past medical history, past social history, past surgical history and problem list       Review of Systems   Constitutional: Negative for chills and fever  HENT:        HPI   Eyes: Negative  Respiratory: Negative  Cardiovascular:        HPI   Gastrointestinal: Negative  Endocrine: Negative  Genitourinary: Negative  Musculoskeletal: Negative  Skin: Negative  Allergic/Immunologic: Positive for environmental allergies  Neurological: Negative  Hematological: Negative  Psychiatric/Behavioral:        HPI         Data to review:       Objective:    Vitals:    02/21/19 1631   BP: 108/64   Pulse: 76   Weight: 57 6 kg (127 lb)   Height: 5' 9" (1 753 m)        Physical Exam   Constitutional: She is oriented to person, place, and time  She appears well-developed and well-nourished  HENT:   Head: Normocephalic and atraumatic     Tympanic membranes are dull without fluid or injection positive allergic turbinates positive clear postnasal drip negative pharyngeal injection or exudate   Eyes: Pupils are equal, round, and reactive to light  EOM are normal    Neck: Normal range of motion  Neck supple  No JVD present  No thyromegaly present  Cardiovascular: Normal rate, regular rhythm, intact distal pulses and normal pulses  Pulmonary/Chest: Effort normal and breath sounds normal    Abdominal: Soft  Bowel sounds are normal  There is no tenderness  Musculoskeletal: Normal range of motion  Right lower leg: She exhibits no edema  Lymphadenopathy:     She has no cervical adenopathy  Neurological: She is alert and oriented to person, place, and time  No cranial nerve deficit  Skin: Skin is warm and dry  Psychiatric: Her mood appears anxious     Positive anxious

## 2019-02-21 NOTE — PATIENT INSTRUCTIONS
Follow-up with Cardiology as planned  If chest pain worsens report to [facility]  Keep follow-up appointment as planned  Take vitamin-D 5000 units daily    Start BuSpar 5 mg twice daily for anxiety

## 2019-02-25 ENCOUNTER — CONSULT (OUTPATIENT)
Dept: CARDIOLOGY CLINIC | Facility: CLINIC | Age: 24
End: 2019-02-25
Payer: COMMERCIAL

## 2019-02-25 VITALS
HEART RATE: 56 BPM | BODY MASS INDEX: 19.7 KG/M2 | SYSTOLIC BLOOD PRESSURE: 100 MMHG | HEIGHT: 69 IN | OXYGEN SATURATION: 99 % | WEIGHT: 133 LBS | DIASTOLIC BLOOD PRESSURE: 70 MMHG

## 2019-02-25 DIAGNOSIS — G47.00 INSOMNIA, UNSPECIFIED TYPE: ICD-10-CM

## 2019-02-25 DIAGNOSIS — R07.89 OTHER CHEST PAIN: Primary | ICD-10-CM

## 2019-02-25 PROCEDURE — 99215 OFFICE O/P EST HI 40 MIN: CPT | Performed by: INTERNAL MEDICINE

## 2019-02-25 RX ORDER — TRAZODONE HYDROCHLORIDE 50 MG/1
50 TABLET ORAL
Qty: 90 TABLET | Refills: 0 | Status: SHIPPED | OUTPATIENT
Start: 2019-02-25 | End: 2019-02-25 | Stop reason: ALTCHOICE

## 2019-02-25 NOTE — LETTER
February 25, 2019     Lori Chapman MD  [facility] [address]  [place] [address] [address]    Patient: Jeanette Villanueva   YOB: 1995   Date of Visit: 2/25/2019       Dear Dr Sang Rivero: Thank you for referring Jocy Schmidt to me for evaluation  Below are my notes for this consultation  If you have questions, please do not hesitate to call me  I look forward to following your patient along with you  Sincerely,        Holger Saldivar MD        CC: No Recipients  Holger Saldivar MD  2/25/2019  3:42 PM  Sign at close encounter   [facility]    [address], [place] [address]  Phone#  [phone]  Fax#  [phone]  *-*-*-*-*-*-*-*-*-*-*-*-*-*-*-*-*-*-*-*-*-*-*-*-*-*-*-*-*-*-*-*-*-*-*-*-*-*-*-*-*-*-*-*-*-*-*-*-*-*-*-*-*-*  Faith Merida DATE: 02/25/19 3:40 PM  PATIENT NAME: Travis Mayer   1995    [id number]  Age: 21 y o  Sex: female  AUTHOR: Holger Saldivar MD  PRIMARYCARE PHYSICIAN: Lori Chapman MD  *-*-*-*-*-*-*-*-*-*-*-*-*-*-*-*-*-*-*-*-*-*-*-*-*-*-*-*-*-*-*-*-*-*-*-*-*-*-*-*-*-*-*-*-*-*-*-*-*-*-*-*-*-*-  REASON FOR REFERRAL:  Evaluation of chest pain  *-*-*-*-*-*-*-*-*-*-*-*-*-*-*-*-*-*-*-*-*-*-*-*-*-*-*-*-*-*-*-*-*-*-*-*-*-*-*-*-*-*-*-*-*-*-*-*-*-*-*-*-*-*-  CARDIOLOGY ASSESSMENT & PLAN:  Other chest pain  Pleasant 21 yr old female with RF of family history of premature CAD and SCD presenting with recent chest pain  Has mild nonspecific EKG abnormality  Recent echocardiogram showed no significant abnormality  Exercise tolerance is good  Does have history of pelvic fracture which apparently has completely healed  BP and physical exam is overall unremarkable  Etiology of symptoms is likely nonspecific  Possible relation to recent use of Isoretinoin  However, given family history recommend further evaluation     - Will arrange for a exercise treadmill stress test  Apparantly this has already been arranged for this Friday  We will follow-up    - Have advised to maintain a diary of symptoms   - Reassured about likely nonspecific nature of symptoms but need for further evaluation  Advised to continue all normal activities  *-*-*-*-*-*-*-*-*-*-*-*-*-*-*-*-*-*-*-*-*-*-*-*-*-*-*-*-*-*-*-*-*-*-*-*-*-*-*-*-*-*-*-*-*-*-*-*-*-*-*-*-*-*-  CURRENT ECG:  No results found for this visit on 02/25/19  ECG performed recently at primary physician's office indicates sinus rhythm with incomplete right bundle-branch block, QRS duration 100 milliseconds, RSR prime in lead V1, likely normal variant  HR 67 BPM     *-*-*-*-*-*-*-*-*-*-*-*-*-*-*-*-*-*-*-*-*-*-*-*-*-*-*-*-*-*-*-*-*-*-*-*-*-*-*-*-*-*-*-*-*-*-*-*-*-*-*-*-*-*-  HISTORY OF PRESENT ILLNESS:  Patient is a pleasant 59-year-old  female with medical history significant for:  1  History of horse back riding accident in [id number] with resulting pelvic and jaw fracture  2  History of anxiety  3  History of vitamin-D deficiency  4 history of eustachian tube dysfunction  5 history of TMJ syndrome  7  History chronic bilateral low back pain    She has been referred to us because she has been experiencing on and chest pain for the last 2 weeks  She describes this symptom as on an of aching and somewhat tightness in the chest   Symptom is occurring almost every day and occurs at random times  Sometimes with activity while other times at rest   There is no definite precipitating factor  but occasionally anxiety makes it worse   She also experiences some shortness of breath associated with this symptom but no definite palpitations  The location of the pain has very sometimes it is precordial while other times it is substernal and sometimes lateral to the left and to the right side  Pain does not become worse with taking deep breaths and there is no relationship with food  No history of presyncope/syncope no recent decline in exercise tolerance    She does mention that she had similar bout of frequent chest pains but to a lesser degree in November 2018 and she was evaluated in the emergency room  She mentions that she was using Isoretinoin therapy for her acne around the same time and symptoms seemed to improve after she discontinued the medication  She recently restarted therapy with this medicine and has just completed the course  She is wondering if her symptoms could be related to use of this medication  Otherwise she has been doing well  She does have chronic back pain and sciatica for which she has received physical therapy  She was previously on Lexapro for anxiety but she did not tolerate it due to insomnia and this was changed from to BuSpar, however at this time she is not taking any anxiety related and her symptoms are well controlled  She has been obvious very active and participated in school sports and other exercise activities and has had no presyncope/syncope  Her exercise tolerance has been good  *-*-*-*-*-*-*-*-*-*-*-*-*-*-*-*-*-*-*-*-*-*-*-*-*-*-*-*-*-*-*-*-*-*-*-*-*-*-*-*-*-*-*-*-*-*-*-*-*-*-*-*-*-*  PAST MEDICAL HISTORY:   Past Medical History:   Diagnosis Date    Fracture of pelvis ([address] )     Last Assessed:  3/1/17    Jaw fracture [facility])     Last Assessed:  3/1/17       PAST SURGICAL HISTORY:   Past Surgical History:   Procedure Laterality Date    WISDOM TOOTH EXTRACTION         FAMILY HISTORY:  Family History   Problem Relation Age of Onset    Coronary artery disease Mother     Hyperlipidemia Mother     Hypothyroidism Mother     Diabetes Maternal Grandfather     Hernia Father      She is a strong family history of premature coronary artery disease with her mom having MI initially at 45s and passing away at the age of 58 years due to sudden cardiac death      SOCIAL HISTORY:  [unfilled]  Social History     Tobacco Use   Smoking Status Never Smoker   Smokeless Tobacco Never Used   Tobacco Comment    No secondhand smoke exposure     Social History     Substance and Sexual Activity   Alcohol Use No Social History     Substance and Sexual Activity   Drug Use No     Occasional alcohol use  Works as [employer] at the [address]  *-*-*-*-*-*-*-*-*-*-*-*-*-*-*-*-*-*-*-*-*-*-*-*-*-*-*-*-*-*-*-*-*-*-*-*-*-*-*-*-*-*-*-*-*-*-*-*-*-*-*-*-*-*  ALLERGIES:  Allergies   Allergen Reactions    Lexapro [Escitalopram]      Unable to sleep      *-*-*-*-*-*-*-*-*-*-*-*-*-*-*-*-*-*-*-*-*-*-*-*-*-*-*-*-*-*-*-*-*-*-*-*-*-*-*-*-*-*-*-*-*-*-*-*-*-*-*-*-*-*  CURRENT OUTPATIENT MEDICATIONS:     Current Outpatient Medications:     azelastine (ASTELIN) 0 1 % nasal spray, 2 sprays into each nostril 2 (two) times a day Use in each nostril as directed, Disp: 30 mL, Rfl: 3    cetirizine (ZyrTEC) 10 mg tablet, Take 10 mg by mouth daily, Disp: , Rfl:     Cholecalciferol (VITAMIN D-3) 5000 units TABS, Take 1 tablet daily, Disp: 1 tablet, Rfl: 0    cyclobenzaprine (FLEXERIL) 5 mg tablet, Take 2 tablets (10 mg total) by mouth 3 (three) times a day as needed for muscle spasms, Disp: 30 tablet, Rfl: 0    DAYSEE 0 15-0 03 &0 01 MG TABS, TAKE 1 TABLET BY MOUTH EVERY DAY, Disp: 28 each, Rfl: 0    DENTA 5000 PLUS 1 1 % CREA, USE A PEA SIZED AMOUNT AT BEDTIME  DO NOT RINSE, EAT OR DRINK FOR 30MINS, Disp: , Rfl: 0    fluticasone (FLONASE ALLERGY RELIEF) 50 mcg/act nasal spray, into each nostril, Disp: , Rfl:     Multiple Vitamins-Minerals (HAIR SKIN AND NAILS FORMULA PO), Take by mouth, Disp: , Rfl:     MYORISAN 30 MG capsule, TAKE ONE CAPSULE BY MOUTH TWICE A DAY W/ FATTY FOOD OR WHOLE MILK, Disp: , Rfl: 0    triamcinolone (KENALOG) 0 1 % cream, APPLY SMALL AMOUNT TWICE A DAY X 2 WEEKS TO THE B/L HANDS AND ELBOW THEN AS NEEDED FOR FLARES, Disp: , Rfl: 0    *-*-*-*-*-*-*-*-*-*-*-*-*-*-*-*-*-*-*-*-*-*-*-*-*-*-*-*-*-*-*-*-*-*-*-*-*-*-*-*-*-*-*-*-*-*-*-*-*-*-*-*-*-*  REVIEW OF SYMPTOMS:    Positive for:  Recurring chest pain  Negative for: All remaining as reviewed below and in HPI      SYSTEM SYMPTOMS REVIEWED:  Generalweight change, fever, night sweats  Respirato  Cardiovascularchest pain, syncope, dyspnea on exertion, edema, decline in exercise tolerance, claudication   Gastrointestinalpersistent vomiting, diarrhea, abdominal distention, blood in stool   Muscular or skeletaljoint pain or swelling   Neurologicheadaches, syncope, abnormal movement  Hematologichistory of easy bruising and bleeding   Endocrinethyroid enlargement, heat or cold intolerance, polyuria   Psychiatricanxiety, depression     *-*-*-*-*-*-*-*-*-*-*-*-*-*-*-*-*-*-*-*-*-*-*-*-*-*-*-*-*-*-*-*-*-*-*-*-*-*-*-*-*-*-*-*-*-*-*-*-*-*-*-*-*-*-  VITAL SIGNS:  Vitals:    02/25/19 1451   BP: 100/70   Pulse: 56   SpO2: 99%   Weight: 60 3 kg (133 lb)   Height: 5' 9" (1 753 m)       *-*-*-*-*-*-*-*-*-*-*-*-*-*-*-*-*-*-*-*-*-*-*-*-*-*-*-*-*-*-*-*-*-*-*-*-*-*-*-*-*-*-*-*-*-*-*-*-*-*-*-*-*-*-  PHYSICAL EXAM:  General Appearance:    Alert, cooperative, no distress, appears stated age, normal built  Head, Eyes, ENT:    No obvious abnormality, moist mucous mebranes  Neck:   Supple, no carotid bruit or JVD   Back:     Symmetric, no curvature  Lungs:     Respirations unlabored  Clear to auscultation bilaterally,    Chest wall:    No tenderness or deformity  No focal tenderness noted on palpation  Heart:     Slightly bradycardic, regular rate and rhythm, S1 and S2 normal, no murmur, rub  or gallop  Abdomen:     Soft, non-tender, No obvious masses, or organomegaly   Extremities:   Extremities normal, no cyanosis or edema    Skin:   Skin color, texture, turgor normal, no rashes or lesions     *-*-*-*-*-*-*-*-*-*-*-*-*-*-*-*-*-*-*-*-*-*-*-*-*-*-*-*-*-*-*-*-*-*-*-*-*-*-*-*-*-*-*-*-*-*-*-*-*-*-*-*-*-*-  LABORATORY DATA:  I have personally reviewed pertinent labs      Potassium   Date Value Ref Range Status   11/24/2018 3 5 3 5 - 5 3 mmol/L Final     Chloride   Date Value Ref Range Status   11/24/2018 105 100 - 108 mmol/L Final     CO2   Date Value Ref Range Status   11/24/2018 24 21 - 32 mmol/L Final     BUN   Date Value Ref Range Status   11/24/2018 12 5 - 25 mg/dL Final     Creatinine   Date Value Ref Range Status   11/24/2018 0 73 0 60 - 1 30 mg/dL Final     Comment:     Standardized to IDMS reference method     eGFR   Date Value Ref Range Status   11/24/2018 117 ml/min/1 73sq m Final     Calcium   Date Value Ref Range Status   11/24/2018 10 0 8 3 - 10 1 mg/dL Final     AST   Date Value Ref Range Status   11/24/2018 15 5 - 45 U/L Final     Comment:       Specimen collection should occur prior to Sulfasalazine administration due to the potential for falsely depressed results  ALT   Date Value Ref Range Status   11/24/2018 13 12 - 78 U/L Final     Comment:       Specimen collection should occur prior to Sulfasalazine and/or Sulfapyridine administration due to the potential for falsely depressed results        Alkaline Phosphatase   Date Value Ref Range Status   11/24/2018 36 (L) 46 - 116 U/L Final     WBC   Date Value Ref Range Status   11/24/2018 6 47 4 31 - 10 16 Thousand/uL Final     Hemoglobin   Date Value Ref Range Status   11/24/2018 12 8 11 5 - 15 4 g/dL Final     Platelets   Date Value Ref Range Status   11/24/2018 234 149 - 390 Thousands/uL Final     No results found for: PT, PTT, INR  No results found for: CKMB, BNP, DIGOXIN  No results found for: TSH  Cholesterol   Date Value Ref Range Status   12/29/2014 179 (H) 125 - 170 mg/dL Final     HDL   Date Value Ref Range Status   12/29/2014 42 36 - 76 mg/dL Final     Triglycerides   Date Value Ref Range Status   12/29/2014 93 40 - 136 mg/dL Final      No results found for: HGBA1C  No results found for: BLOODCX, SPUTUMCULTUR, GRAMSTAIN, URINECX, WOUNDCULT, BODYFLUIDCUL, MRSACULTURE, INFLUAPCR, INFLUBPCR, RSVPCR, LEGIONELLAUR, CDIFFTOXINB    *-*-*-*-*-*-*-*-*-*-*-*-*-*-*-*-*-*-*-*-*-*-*-*-*-*-*-*-*-*-*-*-*-*-*-*-*-*-*-*-*-*-*-*-*-*-*-*-*-*-*-*-*-*-  RADIOLOGY RESULTS:  No results found     *-*-*-*-*-*-*-*-*-*-*-*-*-*-*-*-*-*-*-*-*-*-*-*-*-*-*-*-*-*-*-*-*-*-*-*-*-*-*-*-*-*-*-*-*-*-*-*-*-*-*-*-*-*-  ECHOCARDIOGRAM AND OTHER CARDIOLOGY RESULTS:  Results for orders placed during the hospital encounter of 18   Echo complete with contrast if indicated    Narrative [address] [facility], [address] [facility]    Transthoracic Echocardiogram  2D, M-mode, Doppler, and Color Doppler    Study date:  26-Dec-2018    Patient: Annie Cheatham  MR number: [id number]  Account number: [de-identified]  : 1995  Age: 23 years  Gender: Female  Status: Outpatient  Location: [address] [place] [address]  Height: 69 in  Weight: 124 7 lb  BP: 104/ 74 mmHg    Indications: Abnormal Electrocardiogram, chest pain    Diagnoses: R94 31 - Abnormal electrocardiogram [ECG] [EKG]    Sonographer:  Clive Connors CCT,RCS  Primary Physician:  Marisela Barnard MD  [facility]  Interpreting Physician:  Sebas Cosme MD    IMPRESSIONS:  1  Normal left systolic and diastolic function, EF around 60-65%  2  No significant chamber hypertrophy or enlargement  3  No significant aortic or mitral valve disease noted  4  Trace tricuspid valve regurgitation  5  No obvious pulmonary hypertension  6  No pericardial effusion  No previous echocardiogram is available for comparison  SUMMARY    LEFT VENTRICLE:  Normal left ventricular cavity size, normal wall thickness, normal left ventricle systolic function and wall motion  Ejection fraction is estimated as around 60-65%  Normal diastolic function  RIGHT VENTRICLE:  Normal right ventricular size and systolic function  Normal estimated right ventricular systolic pressure  LEFT ATRIUM:  Normal left atrial cavity size  Intact interatrial septum  RIGHT ATRIUM:  Normal right atrial cavity size  MITRAL VALVE:  Normal appearing mitral valve structure and leaflet motion  Trace mitral valve regurgitation      AORTIC VALVE:  Tricuspid aortic valve with no significant sclerosis  No aortic valve stenosis or regurgitation  TRICUSPID VALVE:  Trace tricuspid valve regurgitation  PULMONIC VALVE:  No significant pulmonic valve regurgitation  AORTA:  Aortic root and proximal ascending aorta are normal in size on 2D imaging  IVC, HEPATIC VEINS:  Inferior vena cava is normal in size and demonstrates appropriate respiratory phasic changes in diameter  PERICARDIUM:  No pericardial effusion  HISTORY: PRIOR HISTORY: Murmur    PROCEDURE: The study was performed in the [address] [place] [address]  This was a routine study  The transthoracic approach was used  The study included complete 2D imaging, M-mode, complete spectral Doppler, and color Doppler  The heart rate was  70 bpm, at the start of the study  Images were obtained from the parasternal, apical, subcostal, and suprasternal notch acoustic windows  Image quality was adequate  LEFT VENTRICLE: Normal left ventricular cavity size, normal wall thickness, normal left ventricle systolic function and wall motion  Ejection fraction is estimated as around 60-65%  Normal diastolic function  RIGHT VENTRICLE: Normal right ventricular size and systolic function  Normal estimated right ventricular systolic pressure  LEFT ATRIUM: Normal left atrial cavity size  Intact interatrial septum  RIGHT ATRIUM: Normal right atrial cavity size  MITRAL VALVE: Normal appearing mitral valve structure and leaflet motion  Trace mitral valve regurgitation  AORTIC VALVE: Tricuspid aortic valve with no significant sclerosis  No aortic valve stenosis or regurgitation  TRICUSPID VALVE: Trace tricuspid valve regurgitation  PULMONIC VALVE: No significant pulmonic valve regurgitation  PERICARDIUM: No pericardial effusion  AORTA: Aortic root and proximal ascending aorta are normal in size on 2D imaging      SYSTEMIC VEINS: IVC: Inferior vena cava is normal in size and demonstrates appropriate respiratory phasic changes in diameter  SYSTEM MEASUREMENT TABLES    2D  %FS: 37 74 %  Ao Diam: 2 94 cm  EDV(Teich): 80 48 ml  EF(Teich): 68 2 %  ESV(Teich): 25 59 ml  IVSd: 0 73 cm  LA Area: 14 89 cm2  LA Diam: 2 9 cm  LVEDV MOD A4C: 79 6 ml  LVEF MOD A4C: 66 97 %  LVESV MOD A4C: 26 29 ml  LVIDd: 4 24 cm  LVIDs: 2 64 cm  LVLd A4C: 7 92 cm  LVLs A4C: 6 3 cm  LVPWd: 0 78 cm  RA Area: 13 16 cm2  RVIDd: 3 01 cm  SV MOD A4C: 53 31 ml  SV(Teich): 54 89 ml    CW  AV Vmax: 1 32 m/s  AV maxP 93 mmHg  RAP: 10 mmHg  TR Vmax: 1 87 m/s  TR maxP 98 mmHg    MM  TAPSE: 2 16 cm    PW  E': 0 14 m/s  E' Sept: 0 14 m/s  E/E': 4 27  E/E' Sept: 4 27  LVOT Vmax: 0 91 m/s  LVOT maxPG: 3 32 mmHg  MV A Jerardo: 0 51 m/s  MV Dec [place]: 3 62 m/s2  MV DecT: 170 2 ms  MV E Jerardo: 0 62 m/s  MV E/A Ratio: 1 21  MV PHT: 49 36 ms  MVA By PHT: 4 46 cm2  PAEDP: 15 34 mmHg  PRend P 34 mmHg  PRend Vmax: 1 15 m/s  PV Vmax: 1 1 m/s  PV maxP 89 mmHg  RVSP: 23 98 mmHg    [facility]    Prepared and electronically signed by    Stanton Colvin MD  Signed 27-Dec-2018 18:24:25       No results found for this or any previous visit  No results found for this or any previous visit  No results found for this or any previous visit       *-*-*-*-*-*-*-*-*-*-*-*-*-*-*-*-*-*-*-*-*-*-*-*-*-*-*-*-*-*-*-*-*-*-*-*-*-*-*-*-*-*-*-*-*-*-*-*-*-*-*-*-*-*-  SIGNATURES:   [unfilled]   Stanton Colvin MD     CC:   MD Jenifer Morales Mai, MD

## 2019-02-25 NOTE — PATIENT INSTRUCTIONS
CARDIOLOGY ASSESSMENT & PLAN:  Other chest pain  Pleasant 21 yr old female with RF of family history of premature CAD and SCD presenting with recent chest pain  Has mild nonspecific EKG abnormality  Recent echocardiogram showed no significant abnormality  Exercise tolerance is good  Does have history of pelvic fracture which apparently has completely healed  BP and physical exam is overall unremarkable  Etiology of symptoms is likely nonspecific  Possible relation to recent use of Isoretinoin  However, given family history recommend further evaluation     - Will arrange for a exercise treadmill stress test  Apparantly this has already been arranged for this Friday  We will follow-up  - Have advised to maintain a diary of symptoms   - Reassured about likely nonspecific nature of symptoms but need for further evaluation  Advised to continue all normal activities  *-*-*-*-*-*-*-*-*-*-*-*-*-*-*-*-*-*-*-*-*-*-*-*-*-*-*-*-*-*-*-*-*-*-*-*-*-*-*-*-*-*-*-*-*-*-*-*-*-*-*-*-*-*-  CURRENT ECG:  No results found for this visit on 02/25/19  ECG performed recently at primary physician's office indicates sinus rhythm with incomplete right bundle-branch block, QRS duration 100 milliseconds, RSR prime in lead V1, likely normal variant   HR 67 BPM

## 2019-02-25 NOTE — PROGRESS NOTES
CARDIOLOGY [facility]OCI[facility]    [facility] [address] [facility], [place] [address]  Phone#  [phone]  Fax#  [phone]  *-*-*-*-*-*-*-*-*-*-*-*-*-*-*-*-*-*-*-*-*-*-*-*-*-*-*-*-*-*-*-*-*-*-*-*-*-*-*-*-*-*-*-*-*-*-*-*-*-*-*-*-*-*  Apryl Moat DATE: 02/25/19 3:40 PM  PATIENT NAME: Rosalina Mayer   1995    [id number]  Age: 21 y o  Sex: female  AUTHOR: Holger Saldivar MD  PRIMARYCARE PHYSICIAN: Liya Paul MD  *-*-*-*-*-*-*-*-*-*-*-*-*-*-*-*-*-*-*-*-*-*-*-*-*-*-*-*-*-*-*-*-*-*-*-*-*-*-*-*-*-*-*-*-*-*-*-*-*-*-*-*-*-*-  REASON FOR REFERRAL:  Evaluation of chest pain  *-*-*-*-*-*-*-*-*-*-*-*-*-*-*-*-*-*-*-*-*-*-*-*-*-*-*-*-*-*-*-*-*-*-*-*-*-*-*-*-*-*-*-*-*-*-*-*-*-*-*-*-*-*-  CARDIOLOGY ASSESSMENT & PLAN:  Other chest pain  Pleasant 21 yr old female with RF of family history of premature CAD and SCD presenting with recent chest pain  Has mild nonspecific EKG abnormality  Recent echocardiogram showed no significant abnormality  Exercise tolerance is good  Does have history of pelvic fracture which apparently has completely healed  BP and physical exam is overall unremarkable  Etiology of symptoms is likely nonspecific  Possible relation to recent use of Isoretinoin  However, given family history recommend further evaluation     - Will arrange for a exercise treadmill stress test  Apparantly this has already been arranged for this Friday  We will follow-up  - Have advised to maintain a diary of symptoms   - Reassured about likely nonspecific nature of symptoms but need for further evaluation  Advised to continue all normal activities  *-*-*-*-*-*-*-*-*-*-*-*-*-*-*-*-*-*-*-*-*-*-*-*-*-*-*-*-*-*-*-*-*-*-*-*-*-*-*-*-*-*-*-*-*-*-*-*-*-*-*-*-*-*-  CURRENT ECG:  No results found for this visit on 02/25/19  ECG performed recently at primary physician's office indicates sinus rhythm with incomplete right bundle-branch block, QRS duration 100 milliseconds, RSR prime in lead V1, likely normal variant   HR 67 BPM     *-*-*-*-*-*-*-*-*-*-*-*-*-*-*-*-*-*-*-*-*-*-*-*-*-*-*-*-*-*-*-*-*-*-*-*-*-*-*-*-*-*-*-*-*-*-*-*-*-*-*-*-*-*-  HISTORY OF PRESENT ILLNESS:  Patient is a pleasant 80-year-old  female with medical history significant for:  1  History of horse back riding accident in [id number] with resulting pelvic and jaw fracture  2  History of anxiety  3  History of vitamin-D deficiency  4 history of eustachian tube dysfunction  5 history of TMJ syndrome  7  History chronic bilateral low back pain    She has been referred to us because she has been experiencing on and chest pain for the last 2 weeks  She describes this symptom as on an of aching and somewhat tightness in the chest   Symptom is occurring almost every day and occurs at random times  Sometimes with activity while other times at rest   There is no definite precipitating factor  but occasionally anxiety makes it worse   She also experiences some shortness of breath associated with this symptom but no definite palpitations  The location of the pain has very sometimes it is precordial while other times it is substernal and sometimes lateral to the left and to the right side  Pain does not become worse with taking deep breaths and there is no relationship with food  No history of presyncope/syncope no recent decline in exercise tolerance  She does mention that she had similar bout of frequent chest pains but to a lesser degree in November 2018 and she was evaluated in the emergency room  She mentions that she was using Isoretinoin therapy for her acne around the same time and symptoms seemed to improve after she discontinued the medication  She recently restarted therapy with this medicine and has just completed the course  She is wondering if her symptoms could be related to use of this medication  Otherwise she has been doing well  She does have chronic back pain and sciatica for which she has received physical therapy      She was previously on Lexapro for anxiety but she did not tolerate it due to insomnia and this was changed from to BuSpar, however at this time she is not taking any anxiety related and her symptoms are well controlled  She has been obvious very active and participated in school sports and other exercise activities and has had no presyncope/syncope  Her exercise tolerance has been good  *-*-*-*-*-*-*-*-*-*-*-*-*-*-*-*-*-*-*-*-*-*-*-*-*-*-*-*-*-*-*-*-*-*-*-*-*-*-*-*-*-*-*-*-*-*-*-*-*-*-*-*-*-*  PAST MEDICAL HISTORY:   Past Medical History:   Diagnosis Date    Fracture of pelvis ([address] )     Last Assessed:  3/1/17    Jaw fracture ([facility])     Last Assessed:  3/1/17       PAST SURGICAL HISTORY:   Past Surgical History:   Procedure Laterality Date    WISDOM TOOTH EXTRACTION         FAMILY HISTORY:  Family History   Problem Relation Age of Onset    Coronary artery disease Mother     Hyperlipidemia Mother     Hypothyroidism Mother     Diabetes Maternal Grandfather     Hernia Father      She is a strong family history of premature coronary artery disease with her mom having MI initially at 45s and passing away at the age of 58 years due to sudden cardiac death  SOCIAL HISTORY:  [unfilled]  Social History     Tobacco Use   Smoking Status Never Smoker   Smokeless Tobacco Never Used   Tobacco Comment    No secondhand smoke exposure     Social History     Substance and Sexual Activity   Alcohol Use No     Social History     Substance and Sexual Activity   Drug Use No     Occasional alcohol use  Works as [employer] at [employer] [address]      *-*-*-*-*-*-*-*-*-*-*-*-*-*-*-*-*-*-*-*-*-*-*-*-*-*-*-*-*-*-*-*-*-*-*-*-*-*-*-*-*-*-*-*-*-*-*-*-*-*-*-*-*-*  ALLERGIES:  Allergies   Allergen Reactions    Lexapro [Escitalopram]      Unable to sleep      *-*-*-*-*-*-*-*-*-*-*-*-*-*-*-*-*-*-*-*-*-*-*-*-*-*-*-*-*-*-*-*-*-*-*-*-*-*-*-*-*-*-*-*-*-*-*-*-*-*-*-*-*-*  CURRENT OUTPATIENT MEDICATIONS:     Current Outpatient Medications:    azelastine (ASTELIN) 0 1 % nasal spray, 2 sprays into each nostril 2 (two) times a day Use in each nostril as directed, Disp: 30 mL, Rfl: 3    cetirizine (ZyrTEC) 10 mg tablet, Take 10 mg by mouth daily, Disp: , Rfl:     Cholecalciferol (VITAMIN D-3) 5000 units TABS, Take 1 tablet daily, Disp: 1 tablet, Rfl: 0    cyclobenzaprine (FLEXERIL) 5 mg tablet, Take 2 tablets (10 mg total) by mouth 3 (three) times a day as needed for muscle spasms, Disp: 30 tablet, Rfl: 0    DAYSEE 0 15-0 03 &0 01 MG TABS, TAKE 1 TABLET BY MOUTH EVERY DAY, Disp: 28 each, Rfl: 0    DENTA 5000 PLUS 1 1 % CREA, USE A PEA SIZED AMOUNT AT BEDTIME  DO NOT RINSE, EAT OR DRINK FOR 30MINS, Disp: , Rfl: 0    fluticasone (FLONASE ALLERGY RELIEF) 50 mcg/act nasal spray, into each nostril, Disp: , Rfl:     Multiple Vitamins-Minerals (HAIR SKIN AND NAILS FORMULA PO), Take by mouth, Disp: , Rfl:     MYORISAN 30 MG capsule, TAKE ONE CAPSULE BY MOUTH TWICE A DAY W/ FATTY FOOD OR WHOLE MILK, Disp: , Rfl: 0    triamcinolone (KENALOG) 0 1 % cream, APPLY SMALL AMOUNT TWICE A DAY X 2 WEEKS TO THE B/L HANDS AND ELBOW THEN AS NEEDED FOR FLARES, Disp: , Rfl: 0    *-*-*-*-*-*-*-*-*-*-*-*-*-*-*-*-*-*-*-*-*-*-*-*-*-*-*-*-*-*-*-*-*-*-*-*-*-*-*-*-*-*-*-*-*-*-*-*-*-*-*-*-*-*  REVIEW OF SYMPTOMS:    Positive for:  Recurring chest pain  Negative for: All remaining as reviewed below and in HPI  SYSTEM SYMPTOMS REVIEWED:  General--weight change, fever, night sweats  Respirato      Cardiovascular--chest pain, syncope, dyspnea on exertion, edema, decline in exercise tolerance, claudication   Gastrointestinal--persistent vomiting, diarrhea, abdominal distention, blood in stool   Muscular or skeletal--joint pain or swelling   Neurologic--headaches, syncope, abnormal movement  Hematologic--history of easy bruising and bleeding   Endocrine--thyroid enlargement, heat or cold intolerance, polyuria   Psychiatric--anxiety, depression *-*-*-*-*-*-*-*-*-*-*-*-*-*-*-*-*-*-*-*-*-*-*-*-*-*-*-*-*-*-*-*-*-*-*-*-*-*-*-*-*-*-*-*-*-*-*-*-*-*-*-*-*-*-  VITAL SIGNS:  Vitals:    02/25/19 1451   BP: 100/70   Pulse: 56   SpO2: 99%   Weight: 60 3 kg (133 lb)   Height: 5' 9" (1 753 m)       *-*-*-*-*-*-*-*-*-*-*-*-*-*-*-*-*-*-*-*-*-*-*-*-*-*-*-*-*-*-*-*-*-*-*-*-*-*-*-*-*-*-*-*-*-*-*-*-*-*-*-*-*-*-  PHYSICAL EXAM:  General Appearance:    Alert, cooperative, no distress, appears stated age, normal built  Head, Eyes, ENT:    No obvious abnormality, moist mucous mebranes  Neck:   Supple, no carotid bruit or JVD   Back:     Symmetric, no curvature  Lungs:     Respirations unlabored  Clear to auscultation bilaterally,    Chest wall:    No tenderness or deformity  No focal tenderness noted on palpation  Heart:    Slightly bradycardic, regular rate and rhythm, S1 and S2 normal, no murmur, rub  or gallop  Abdomen:     Soft, non-tender, No obvious masses, or organomegaly   Extremities:   Extremities normal, no cyanosis or edema    Skin:   Skin color, texture, turgor normal, no rashes or lesions     *-*-*-*-*-*-*-*-*-*-*-*-*-*-*-*-*-*-*-*-*-*-*-*-*-*-*-*-*-*-*-*-*-*-*-*-*-*-*-*-*-*-*-*-*-*-*-*-*-*-*-*-*-*-  LABORATORY DATA:  I have personally reviewed pertinent labs      Potassium   Date Value Ref Range Status   11/24/2018 3 5 3 5 - 5 3 mmol/L Final     Chloride   Date Value Ref Range Status   11/24/2018 105 100 - 108 mmol/L Final     CO2   Date Value Ref Range Status   11/24/2018 24 21 - 32 mmol/L Final     BUN   Date Value Ref Range Status   11/24/2018 12 5 - 25 mg/dL Final     Creatinine   Date Value Ref Range Status   11/24/2018 0 73 0 60 - 1 30 mg/dL Final     Comment:     Standardized to IDMS reference method     eGFR   Date Value Ref Range Status   11/24/2018 117 ml/min/1 73sq m Final     Calcium   Date Value Ref Range Status   11/24/2018 10 0 8 3 - 10 1 mg/dL Final     AST   Date Value Ref Range Status   11/24/2018 15 5 - 45 U/L Final     Comment: Specimen collection should occur prior to Sulfasalazine administration due to the potential for falsely depressed results  ALT   Date Value Ref Range Status   11/24/2018 13 12 - 78 U/L Final     Comment:       Specimen collection should occur prior to Sulfasalazine and/or Sulfapyridine administration due to the potential for falsely depressed results  Alkaline Phosphatase   Date Value Ref Range Status   11/24/2018 36 (L) 46 - 116 U/L Final     WBC   Date Value Ref Range Status   11/24/2018 6 47 4 31 - 10 16 Thousand/uL Final     Hemoglobin   Date Value Ref Range Status   11/24/2018 12 8 11 5 - 15 4 g/dL Final     Platelets   Date Value Ref Range Status   11/24/2018 234 149 - 390 Thousands/uL Final     No results found for: PT, PTT, INR  No results found for: CKMB, BNP, DIGOXIN  No results found for: TSH  Cholesterol   Date Value Ref Range Status   12/29/2014 179 (H) 125 - 170 mg/dL Final     HDL   Date Value Ref Range Status   12/29/2014 42 36 - 76 mg/dL Final     Triglycerides   Date Value Ref Range Status   12/29/2014 93 40 - 136 mg/dL Final      No results found for: HGBA1C  No results found for: BLOODCX, SPUTUMCULTUR, GRAMSTAIN, URINECX, WOUNDCULT, BODYFLUIDCUL, MRSACULTURE, INFLUAPCR, INFLUBPCR, RSVPCR, LEGIONELLAUR, CDIFFTOXINB    *-*-*-*-*-*-*-*-*-*-*-*-*-*-*-*-*-*-*-*-*-*-*-*-*-*-*-*-*-*-*-*-*-*-*-*-*-*-*-*-*-*-*-*-*-*-*-*-*-*-*-*-*-*-  RADIOLOGY RESULTS:  No results found      *-*-*-*-*-*-*-*-*-*-*-*-*-*-*-*-*-*-*-*-*-*-*-*-*-*-*-*-*-*-*-*-*-*-*-*-*-*-*-*-*-*-*-*-*-*-*-*-*-*-*-*-*-*-  ECHOCARDIOGRAM AND OTHER CARDIOLOGY RESULTS:  Results for orders placed during the hospital encounter of 12/26/18   Echo complete with contrast if indicated    [address]    Transthoracic Echocardiogram  2D, M-mode, Doppler, and Color Doppler    Study date:  26-Dec-2018    Patient: Daina Sorto  MR number: [id number]  Account number: [id number]  : 1995  Age: 21 years  Gender: Female  Status: Outpatient  Location: [facility]  Height: 69 in  Weight: 124 7 lb  BP: 104/ 74 mmHg    Indications: Abnormal Electrocardiogram, chest pain    Diagnoses: R94 31 - Abnormal electrocardiogram [ECG] [EKG]    Sonographer:  FAYE Cooper,RCS  Primary Physician:  Lamar Baer MD  Group:  [facility]  Interpreting Physician:  Sue Vazquez MD    IMPRESSIONS:  1  Normal left systolic and diastolic function, EF around 60-65%  2  No significant chamber hypertrophy or enlargement  3  No significant aortic or mitral valve disease noted  4  Trace tricuspid valve regurgitation  5  No obvious pulmonary hypertension  6  No pericardial effusion  No previous echocardiogram is available for comparison  SUMMARY    LEFT VENTRICLE:  Normal left ventricular cavity size, normal wall thickness, normal left ventricle systolic function and wall motion  Ejection fraction is estimated as around 60-65%  Normal diastolic function  RIGHT VENTRICLE:  Normal right ventricular size and systolic function  Normal estimated right ventricular systolic pressure  LEFT ATRIUM:  Normal left atrial cavity size  Intact interatrial septum  RIGHT ATRIUM:  Normal right atrial cavity size  MITRAL VALVE:  Normal appearing mitral valve structure and leaflet motion  Trace mitral valve regurgitation  AORTIC VALVE:  Tricuspid aortic valve with no significant sclerosis  No aortic valve stenosis or regurgitation  TRICUSPID VALVE:  Trace tricuspid valve regurgitation  PULMONIC VALVE:  No significant pulmonic valve regurgitation  AORTA:  Aortic root and proximal ascending aorta are normal in size on 2D imaging  IVC, HEPATIC VEINS:  Inferior vena cava is normal in size and demonstrates appropriate respiratory phasic changes in diameter  PERICARDIUM:  No pericardial effusion      HISTORY: PRIOR HISTORY: Murmur    PROCEDURE: The study was performed in the [facility]  This was a routine study  The transthoracic approach was used  The study included complete 2D imaging, M-mode, complete spectral Doppler, and color Doppler  The heart rate was  70 bpm, at the start of the study  Images were obtained from the parasternal, apical, subcostal, and suprasternal notch acoustic windows  Image quality was adequate  LEFT VENTRICLE: Normal left ventricular cavity size, normal wall thickness, normal left ventricle systolic function and wall motion  Ejection fraction is estimated as around 60-65%  Normal diastolic function  RIGHT VENTRICLE: Normal right ventricular size and systolic function  Normal estimated right ventricular systolic pressure  LEFT ATRIUM: Normal left atrial cavity size  Intact interatrial septum  RIGHT ATRIUM: Normal right atrial cavity size  MITRAL VALVE: Normal appearing mitral valve structure and leaflet motion  Trace mitral valve regurgitation  AORTIC VALVE: Tricuspid aortic valve with no significant sclerosis  No aortic valve stenosis or regurgitation  TRICUSPID VALVE: Trace tricuspid valve regurgitation  PULMONIC VALVE: No significant pulmonic valve regurgitation  PERICARDIUM: No pericardial effusion  AORTA: Aortic root and proximal ascending aorta are normal in size on 2D imaging  SYSTEMIC VEINS: IVC: Inferior vena cava is normal in size and demonstrates appropriate respiratory phasic changes in diameter      SYSTEM MEASUREMENT TABLES    2D  %FS: 37 74 %  Ao Diam: 2 94 cm  EDV(Teich): 80 48 ml  EF(Teich): 68 2 %  ESV(Teich): 25 59 ml  IVSd: 0 73 cm  LA Area: 14 89 cm2  LA Diam: 2 9 cm  LVEDV MOD A4C: 79 6 ml  LVEF MOD A4C: 66 97 %  LVESV MOD A4C: 26 29 ml  LVIDd: 4 24 cm  LVIDs: 2 64 cm  LVLd A4C: 7 92 cm  LVLs A4C: 6 3 cm  LVPWd: 0 78 cm  RA Area: 13 16 cm2  RVIDd: 3 01 cm  SV MOD A4C: 53 31 ml  SV(Teich): 54 89 ml    CW  AV Vmax: 1 32 m/s  AV maxP 93 mmHg  RAP: 10 mmHg  TR Vmax: 1 87 m/s  TR maxP 98 mmHg    MM  TAPSE: 2 16 cm    PW  E': 0 14 m/s  E' Sept: 0 14 m/s  E/E': 4 27  E/E' Sept: 4 27  LVOT Vmax: 0 91 m/s  LVOT maxPG: 3 32 mmHg  MV A Jerardo: 0 51 m/s  MV Dec [place]: 3 62 m/s2  MV DecT: 170 2 ms  MV E Jerardo: 0 62 m/s  MV E/A Ratio: 1 21  MV PHT: 49 36 ms  MVA By PHT: 4 46 cm2  PAEDP: 15 34 mmHg  PRend P 34 mmHg  PRend Vmax: 1 15 m/s  PV Vmax: 1 1 m/s  PV maxP 89 mmHg  RVSP: 23 98 mmHg    [facility]    Prepared and electronically signed by    Kirstie Gay MD  Signed 27-Dec-2018 18:24:25       No results found for this or any previous visit  No results found for this or any previous visit  No results found for this or any previous visit       *-*-*-*-*-*-*-*-*-*-*-*-*-*-*-*-*-*-*-*-*-*-*-*-*-*-*-*-*-*-*-*-*-*-*-*-*-*-*-*-*-*-*-*-*-*-*-*-*-*-*-*-*-*-  SIGNATURES:   @FAF@   Kirstie Gay MD     CC:   MD Vasile Ritchie MD

## 2019-02-25 NOTE — ASSESSMENT & PLAN NOTE
Pleasant 21 yr old female with RF of family history of premature CAD and SCD presenting with recent chest pain  Has mild nonspecific EKG abnormality  Recent echocardiogram showed no significant abnormality  Exercise tolerance is good  Does have history of pelvic fracture which apparently has completely healed  BP and physical exam is overall unremarkable  Etiology of symptoms is likely nonspecific  Possible relation to recent use of Isoretinoin  However, given family history recommend further evaluation     - Will arrange for a exercise treadmill stress test  Apparantly this has already been arranged for this Friday  We will follow-up  - Have advised to maintain a diary of symptoms   - Reassured about likely nonspecific nature of symptoms but need for further evaluation  Advised to continue all normal activities

## 2019-03-01 ENCOUNTER — HOSPITAL ENCOUNTER (OUTPATIENT)
Dept: NON INVASIVE DIAGNOSTICS | Facility: CLINIC | Age: 24
Discharge: HOME | End: 2019-03-01
Payer: COMMERCIAL

## 2019-03-01 DIAGNOSIS — R07.9 CHEST PAIN, UNSPECIFIED TYPE: ICD-10-CM

## 2019-03-01 PROCEDURE — 93350 STRESS TTE ONLY: CPT

## 2019-03-01 PROCEDURE — 93351 STRESS TTE COMPLETE: CPT | Performed by: INTERNAL MEDICINE

## 2019-03-02 ENCOUNTER — OFFICE VISIT (OUTPATIENT)
Dept: URGENT CARE | Facility: CLINIC | Age: 24
End: 2019-03-02
Payer: COMMERCIAL

## 2019-03-02 VITALS
TEMPERATURE: 97.6 F | WEIGHT: 124.6 LBS | OXYGEN SATURATION: 100 % | HEIGHT: 69 IN | SYSTOLIC BLOOD PRESSURE: 104 MMHG | HEART RATE: 84 BPM | RESPIRATION RATE: 16 BRPM | DIASTOLIC BLOOD PRESSURE: 66 MMHG | BODY MASS INDEX: 18.46 KG/M2

## 2019-03-02 DIAGNOSIS — R12 HEARTBURN: Primary | ICD-10-CM

## 2019-03-02 DIAGNOSIS — F45.8: ICD-10-CM

## 2019-03-02 PROCEDURE — 99213 OFFICE O/P EST LOW 20 MIN: CPT | Performed by: FAMILY MEDICINE

## 2019-03-02 PROCEDURE — S9088 SERVICES PROVIDED IN URGENT: HCPCS | Performed by: FAMILY MEDICINE

## 2019-03-02 RX ORDER — MAGNESIUM HYDROXIDE/ALUMINUM HYDROXICE/SIMETHICONE 120; 1200; 1200 MG/30ML; MG/30ML; MG/30ML
30 SUSPENSION ORAL EVERY 4 HOURS PRN
Status: DISCONTINUED | OUTPATIENT
Start: 2019-03-02 | End: 2019-03-25

## 2019-03-02 RX ADMIN — MAGNESIUM HYDROXIDE/ALUMINUM HYDROXICE/SIMETHICONE 30 ML: 120; 1200; 1200 SUSPENSION ORAL at 15:03

## 2019-03-02 NOTE — PATIENT INSTRUCTIONS
Symptomatic improvement with viscous lidocaine and Maalox  Recommend Famotidine 20mg 1-2 tablets as needed for any symptom recurrence  Follow-up with PCP to address underlying uncontrolled anxiety symptoms

## 2019-03-02 NOTE — PROGRESS NOTES
[facility]        NAME: Facundo Jamison is a 21 y o  female  : 1995    MRN: [id number]  DATE: 2019  TIME: 3:30 PM    Assessment and Plan   Heartburn [R12]  1  Heartburn  lidocaine viscous (XYLOCAINE) 2 % mucosal solution 15 mL    aluminum-magnesium hydroxide-simethicone (MYLANTA) 200-200-20 mg/5 mL oral suspension 30 mL   2  Nervous indigestion  lidocaine viscous (XYLOCAINE) 2 % mucosal solution 15 mL    aluminum-magnesium hydroxide-simethicone (MYLANTA) 200-200-20 mg/5 mL oral suspension 30 mL         Patient Instructions   Patient Instructions   Symptomatic improvement with viscous lidocaine and Maalox  Recommend Famotidine 20mg 1-2 tablets as needed for any symptom recurrence  Follow-up with PCP to address underlying uncontrolled anxiety symptoms  Proceed to  ER if symptoms worsen  Chief Complaint     Chief Complaint   Patient presents with    Heartburn     Reports heartburn and indigestion x 1 day  Reports taking 2 tums  Reports feeling anxious and just wants to make sure nothing is going on          History of Present Illness       Patient reports feeling a sensation of heartburn, indigestion and reflux type symptoms that she noted early this morning  She indicates she has not eaten anything of that a granola bar this morning  She does have significant uncontrolled anxiety that she acknowledges is not being adequately treated  She has tried SSRIs in the past which she has not responded well to  She is looking into pursuing counseling and other forms of treatment for her anxiety which she does acknowledges being significant for her  Slight medical issues such as this indigestion she is currently dealing with this setting off for anxiety and making her feel more uncomfortable  She denies nausea or vomiting, no anorexia or bulimia no abdominal pain no dysuria, urgency, frequency  No chest pain shortness of breath palpitations lightheadedness dizziness or syncope  Bowels have been normal for her  No melena or hematochezia  She did take Tums this morning which had not really helped  Review of Systems   Review of Systems   Constitutional: Negative  HENT: Negative  Eyes: Negative  Respiratory: Negative  Cardiovascular: Negative for chest pain  Gastrointestinal: Negative for abdominal distention, abdominal pain, nausea and vomiting  Endocrine: Negative  Genitourinary: Negative  Neurological: Negative  Current Medications       Current Outpatient Medications:     Cholecalciferol (VITAMIN D-3) 5000 units TABS, Take 1 tablet daily, Disp: 1 tablet, Rfl: 0    DAYSEE 0 15-0 03 &0 01 MG TABS, TAKE 1 TABLET BY MOUTH EVERY DAY, Disp: 28 each, Rfl: 0    Multiple Vitamins-Minerals (HAIR SKIN AND NAILS FORMULA PO), Take by mouth, Disp: , Rfl:     triamcinolone (KENALOG) 0 1 % cream, APPLY SMALL AMOUNT TWICE A DAY X 2 WEEKS TO THE B/L HANDS AND ELBOW THEN AS NEEDED FOR FLARES, Disp: , Rfl: 0    azelastine (ASTELIN) 0 1 % nasal spray, 2 sprays into each nostril 2 (two) times a day Use in each nostril as directed (Patient not taking: Reported on 3/2/2019), Disp: 30 mL, Rfl: 3    cetirizine (ZyrTEC) 10 mg tablet, Take 10 mg by mouth daily, Disp: , Rfl:     cyclobenzaprine (FLEXERIL) 5 mg tablet, Take 2 tablets (10 mg total) by mouth 3 (three) times a day as needed for muscle spasms (Patient not taking: Reported on 3/2/2019), Disp: 30 tablet, Rfl: 0    DENTA 5000 PLUS 1 1 % CREA, USE A PEA SIZED AMOUNT AT BEDTIME   DO NOT RINSE, EAT OR DRINK FOR 30MINS, Disp: , Rfl: 0    fluticasone (FLONASE ALLERGY RELIEF) 50 mcg/act nasal spray, into each nostril, Disp: , Rfl:     MYORISAN 30 MG capsule, TAKE ONE CAPSULE BY MOUTH TWICE A DAY W/ FATTY FOOD OR WHOLE MILK, Disp: , Rfl: 0    Current Facility-Administered Medications:     aluminum-magnesium hydroxide-simethicone (MYLANTA) 200-200-20 mg/5 mL oral suspension 30 mL, 30 mL, Oral, Q4H PRN, Darwin Street DO, 30 mL at 03/02/19 1503    lidocaine viscous (XYLOCAINE) 2 % mucosal solution 15 mL, 15 mL, Swish & Spit, 4x Daily PRN, Nga Burns, DO, 15 mL at 03/02/19 1503    Current Allergies     Allergies as of 03/02/2019 - Reviewed 03/02/2019   Allergen Reaction Noted    Lexapro [escitalopram]  10/02/2018            The following portions of the patient's history were reviewed and updated as appropriate: allergies, current medications, past family history, past medical history, past social history, past surgical history and problem list      Past Medical History:   Diagnosis Date    Allergic     Anxiety     Eczema     Fracture of pelvis ([address] )     Last Assessed:  3/1/17    J[address] [address] )     Last Assessed:  3/1/17       Past Surgical History:   Procedure Laterality Date    WISDOM TOOTH EXTRACTION         Family History   Problem Relation Age of Onset    Coronary artery disease Mother     Hyperlipidemia Mother     Hypothyroidism Mother     Diabetes Maternal Grandfather     Hernia Father          Medications have been verified  Objective   /66 (BP Location: Left arm, Patient Position: Sitting)   Pulse 84   Temp 97 6 °F (36 4 °C) (Tympanic)   Resp 16   Ht 5' 9" (1 753 m)   Wt 56 5 kg (124 lb 9 6 oz)   SpO2 100%   BMI 18 40 kg/m²        Physical Exam     Physical Exam   Constitutional: She appears well-developed and well-nourished  HENT:   Mouth/Throat: Oropharynx is clear and moist    Neck: Neck supple  Cardiovascular: Normal rate, regular rhythm and normal heart sounds  Pulmonary/Chest: Effort normal and breath sounds normal    Abdominal: Soft  There is tenderness  Slight epigastric tenderness to palpation, otherwise normal exam no rebound rigidity or guarding no distension   Lymphadenopathy:     She has no cervical adenopathy  Psychiatric:   Appears visibly anxious and slightly tearful discussing her symptoms

## 2019-03-04 LAB
MAX DIASTOLIC BP: 69 MMHG
MAX HEART RATE: 173 BPM
MAX PREDICTED HEART RATE: 197 BPM
MAX. SYSTOLIC BP: 151 MMHG
PROTOCOL NAME: NORMAL
REASON FOR TERMINATION: NORMAL
TARGET HR FORMULA: NORMAL
TEST INDICATION: NORMAL
TIME IN EXERCISE PHASE: NORMAL

## 2019-03-14 NOTE — PROGRESS NOTES
Pt canceled several appointments and has not been seen in > than 1 month  Discharge episode of care

## 2019-03-19 ENCOUNTER — TELEPHONE (OUTPATIENT)
Dept: FAMILY MEDICINE CLINIC | Facility: CLINIC | Age: 24
End: 2019-03-19

## 2019-03-19 DIAGNOSIS — F41.1 GENERALIZED ANXIETY DISORDER: Primary | ICD-10-CM

## 2019-03-19 NOTE — TELEPHONE ENCOUNTER
Pt is requesting a referral to cognitive B/H, not just LCSW    Please call Pt with suggested providers/referrals

## 2019-03-19 NOTE — TELEPHONE ENCOUNTER
[facility] [phone]  3/19/18 at 10:37AM  Piedad Figueroa, Call Reference #[id number]    Per rep; Pt has a $30 co-pay for outpatient B/H visits at the physician office      No auth req for these services

## 2019-03-25 ENCOUNTER — OFFICE VISIT (OUTPATIENT)
Dept: FAMILY MEDICINE CLINIC | Facility: CLINIC | Age: 24
End: 2019-03-25
Payer: COMMERCIAL

## 2019-03-25 VITALS
SYSTOLIC BLOOD PRESSURE: 112 MMHG | DIASTOLIC BLOOD PRESSURE: 58 MMHG | HEIGHT: 69 IN | BODY MASS INDEX: 19.08 KG/M2 | WEIGHT: 128.8 LBS | HEART RATE: 72 BPM

## 2019-03-25 DIAGNOSIS — F41.1 GENERALIZED ANXIETY DISORDER: ICD-10-CM

## 2019-03-25 DIAGNOSIS — R07.89 OTHER CHEST PAIN: ICD-10-CM

## 2019-03-25 DIAGNOSIS — M79.10 MYALGIA: Primary | ICD-10-CM

## 2019-03-25 DIAGNOSIS — G89.29 CHRONIC BILATERAL LOW BACK PAIN WITHOUT SCIATICA: ICD-10-CM

## 2019-03-25 DIAGNOSIS — M54.50 CHRONIC BILATERAL LOW BACK PAIN WITHOUT SCIATICA: ICD-10-CM

## 2019-03-25 PROBLEM — Z30.40 ENCOUNTER FOR SURVEILLANCE OF CONTRACEPTIVES: Status: ACTIVE | Noted: 2019-03-25

## 2019-03-25 PROCEDURE — 99214 OFFICE O/P EST MOD 30 MIN: CPT | Performed by: PHYSICIAN ASSISTANT

## 2019-03-25 RX ORDER — MELOXICAM 7.5 MG/1
7.5 TABLET ORAL DAILY
Qty: 30 TABLET | Refills: 1 | Status: SHIPPED | OUTPATIENT
Start: 2019-03-25 | End: 2019-04-09 | Stop reason: SINTOL

## 2019-03-25 NOTE — PROGRESS NOTES
Assessment and Plan:  Refer to Rheum  Trial Mobic 7 5 once daily with food  Problem List Items Addressed This Visit        Other    Generalized anxiety disorder     I have encouraged patient to reschedule him stone  Patient is in great need of psychiatric evaluation and treatment however she has failed treatment with us with different medications and she needs to get into the [address] [place] [address]  I will look into the fact that the patient states that they told her that they are not accepting new patients at this time  I do highly believes that most likely all of patient's constellation of symptoms ultimately will be secondary to her psychiatric diagnoses  Chronic bilateral low back pain without sciatica    Relevant Medications    meloxicam (MOBIC) 7 5 mg tablet    Other Relevant Orders    Ambulatory referral to Rheumatology    Other chest pain    Relevant Medications    meloxicam (MOBIC) 7 5 mg tablet    Other Relevant Orders    Ambulatory referral to Rheumatology    Myalgia - Primary    Relevant Medications    meloxicam (MOBIC) 7 5 mg tablet    Other Relevant Orders    Ambulatory referral to Rheumatology                 Diagnoses and all orders for this visit:    Myalgia  -     Ambulatory referral to Rheumatology; Future  -     meloxicam (MOBIC) 7 5 mg tablet; Take 1 tablet (7 5 mg total) by mouth daily    Other chest pain  -     Ambulatory referral to Rheumatology; Future  -     meloxicam (MOBIC) 7 5 mg tablet; Take 1 tablet (7 5 mg total) by mouth daily    Chronic bilateral low back pain without sciatica  -     Ambulatory referral to Rheumatology; Future  -     meloxicam (MOBIC) 7 5 mg tablet; Take 1 tablet (7 5 mg total) by mouth daily    Generalized anxiety disorder              Subjective:      Patient ID: Mae Ludwig is a 21 y o  female  CC:    Chief Complaint   Patient presents with    Muscle Pain    Spasms    Abdominal Pain     Lower abdominal pain off and on  HPI:    Still with chest pain  Muscle wall pain  Did see cardiology  Muscle soreness and joint stiffness, headaches daily, muscle twitching in feet, calf, arm, random  Does have a therapist apt outside network this week  Chest pain is on the side where her ribs connected her sternum  She is afraid that she has problem with her breast   She states she has been on Accutane for 1 month now and allow the symptoms came wall starting this  She admits to having an immense issue with anxiety and when she was called by [facility] behavior Health they told her that they are not accepting new patients  She had an appoint with Chem stone and unfortunately it was twice rescheduled so she no longer wants to see her and did call around outside the network to get an appointment with a therapist who she seeing this week  Patient continues to complain of multiple symptoms including heartburn and burping abdominal cramping  Patient admits that it may be connected to her anxiety yet she is unable to get into the healthcare system for Behavioral Health  Patient has had a full blood workup including Lyme CK sed rate rheumatoid factor HO etc and all has been normal except for vitamin-D level which was 25 and patient is not taking 5000 units once daily  The following portions of the patient's history were reviewed and updated as appropriate: allergies, current medications, past family history, past medical history, past social history, past surgical history and problem list       Review of Systems   Constitutional: Negative  HENT: Positive for congestion  Eyes: Negative  Respiratory: Negative  Cardiovascular: Negative  Gastrointestinal: Positive for abdominal pain  Endocrine: Negative  Genitourinary: Negative  Musculoskeletal: Positive for arthralgias, back pain and myalgias  Skin: Negative  Allergic/Immunologic: Negative  Neurological: Positive for headaches  Hematological: Negative  Psychiatric/Behavioral: The patient is nervous/anxious  Data to review:       Objective:    Vitals:    03/25/19 0839   BP: 112/58   BP Location: Left arm   Patient Position: Sitting   Pulse: 72   Weight: 58 4 kg (128 lb 12 8 oz)   Height: 5' 9" (1 753 m)        Physical Exam   Constitutional: She is oriented to person, place, and time  She appears well-developed and well-nourished  No distress  HENT:   Head: Normocephalic and atraumatic  Eyes: Pupils are equal, round, and reactive to light  Conjunctivae are normal  Right eye exhibits no discharge  Left eye exhibits no discharge  Neck: Neck supple  Carotid bruit is not present  Cardiovascular: Normal rate, regular rhythm and normal heart sounds  Exam reveals no gallop and no friction rub  No murmur heard  Pulmonary/Chest: Effort normal and breath sounds normal  No stridor  No respiratory distress  She has no wheezes  She has no rhonchi  She has no rales  She exhibits no tenderness  Neurological: She is alert and oriented to person, place, and time  Skin: Skin is warm and dry  She is not diaphoretic  Psychiatric: Her behavior is normal  Judgment normal  Her mood appears anxious  Her speech is rapid and/or pressured  Thought content is paranoid  Cognition and memory are normal    Nursing note and vitals reviewed

## 2019-03-25 NOTE — ASSESSMENT & PLAN NOTE
I have encouraged patient to reschedule him stone  Patient is in great need of psychiatric evaluation and treatment however she has failed treatment with us with different medications and she needs to get into the [address] [place] [address] Street  I will look into the fact that the patient states that they told her that they are not accepting new patients at this time  I do highly believes that most likely all of patient's constellation of symptoms ultimately will be secondary to her psychiatric diagnoses

## 2019-03-25 NOTE — PATIENT INSTRUCTIONS
Problem List Items Addressed This Visit        Other    Generalized anxiety disorder     I have encouraged patient to reschedule him stone  Patient is in great need of psychiatric evaluation and treatment however she has failed treatment with us with different medications and she needs to get into the [address] [place] [address]  I will look into the fact that the patient states that they told her that they are not accepting new patients at this time  I do highly believes that most likely all of patient's constellation of symptoms ultimately will be secondary to her psychiatric diagnoses           Chronic bilateral low back pain without sciatica    Relevant Medications    meloxicam (MOBIC) 7 5 mg tablet    Other Relevant Orders    Ambulatory referral to Rheumatology    Other chest pain    Relevant Medications    meloxicam (MOBIC) 7 5 mg tablet    Other Relevant Orders    Ambulatory referral to Rheumatology    Myalgia - Primary    Relevant Medications    meloxicam (MOBIC) 7 5 mg tablet    Other Relevant Orders    Ambulatory referral to Rheumatology

## 2019-03-27 ENCOUNTER — TELEPHONE (OUTPATIENT)
Dept: FAMILY MEDICINE CLINIC | Facility: CLINIC | Age: 24
End: 2019-03-27

## 2019-03-27 NOTE — TELEPHONE ENCOUNTER
Pt notified that rheumatology appt is fine  Pt questions how long before she notices improvement with Mobic? And if that doesn't help, what else can she do before she sees rheum?

## 2019-03-27 NOTE — TELEPHONE ENCOUNTER
Patient go an appointment with Rheumatology Dr Haylee Call  For April 15 is that ok or should she get in sooner ?  Please call her at [phone]

## 2019-03-28 ENCOUNTER — HOSPITAL ENCOUNTER (OUTPATIENT)
Dept: RADIOLOGY | Age: 24
Discharge: HOME | End: 2019-03-28
Payer: COMMERCIAL

## 2019-03-28 DIAGNOSIS — R10.13 EPIGASTRIC PAIN: ICD-10-CM

## 2019-03-28 PROCEDURE — 76700 US EXAM ABDOM COMPLETE: CPT

## 2019-04-01 ENCOUNTER — OFFICE VISIT (OUTPATIENT)
Dept: PHYSICAL THERAPY | Facility: CLINIC | Age: 24
End: 2019-04-01
Payer: COMMERCIAL

## 2019-04-01 DIAGNOSIS — G89.29 CHRONIC MIDLINE LOW BACK PAIN WITHOUT SCIATICA: Primary | ICD-10-CM

## 2019-04-01 DIAGNOSIS — M54.50 CHRONIC MIDLINE LOW BACK PAIN WITHOUT SCIATICA: Primary | ICD-10-CM

## 2019-04-01 PROCEDURE — 97014 ELECTRIC STIMULATION THERAPY: CPT | Performed by: PHYSICAL THERAPIST

## 2019-04-01 PROCEDURE — 97112 NEUROMUSCULAR REEDUCATION: CPT | Performed by: PHYSICAL THERAPIST

## 2019-04-01 PROCEDURE — 97161 PT EVAL LOW COMPLEX 20 MIN: CPT | Performed by: PHYSICAL THERAPIST

## 2019-04-01 PROCEDURE — 97010 HOT OR COLD PACKS THERAPY: CPT | Performed by: PHYSICAL THERAPIST

## 2019-04-01 PROCEDURE — 97140 MANUAL THERAPY 1/> REGIONS: CPT | Performed by: PHYSICAL THERAPIST

## 2019-04-02 ENCOUNTER — OFFICE VISIT (OUTPATIENT)
Dept: OBGYN CLINIC | Facility: MEDICAL CENTER | Age: 24
End: 2019-04-02
Payer: COMMERCIAL

## 2019-04-02 ENCOUNTER — TELEPHONE (OUTPATIENT)
Dept: FAMILY MEDICINE CLINIC | Facility: CLINIC | Age: 24
End: 2019-04-02

## 2019-04-02 VITALS
SYSTOLIC BLOOD PRESSURE: 102 MMHG | WEIGHT: 127 LBS | HEIGHT: 69 IN | BODY MASS INDEX: 18.81 KG/M2 | DIASTOLIC BLOOD PRESSURE: 64 MMHG

## 2019-04-02 DIAGNOSIS — N64.4 BREAST PAIN, LEFT: Primary | ICD-10-CM

## 2019-04-02 PROCEDURE — 99213 OFFICE O/P EST LOW 20 MIN: CPT | Performed by: NURSE PRACTITIONER

## 2019-04-04 ENCOUNTER — OFFICE VISIT (OUTPATIENT)
Dept: PHYSICAL THERAPY | Facility: CLINIC | Age: 24
End: 2019-04-04
Payer: COMMERCIAL

## 2019-04-04 ENCOUNTER — TRANSCRIBE ORDERS (OUTPATIENT)
Dept: MAMMOGRAPHY | Facility: CLINIC | Age: 24
End: 2019-04-04

## 2019-04-04 DIAGNOSIS — G89.29 CHRONIC MIDLINE LOW BACK PAIN WITHOUT SCIATICA: Primary | ICD-10-CM

## 2019-04-04 DIAGNOSIS — M54.50 CHRONIC MIDLINE LOW BACK PAIN WITHOUT SCIATICA: Primary | ICD-10-CM

## 2019-04-04 PROCEDURE — 97010 HOT OR COLD PACKS THERAPY: CPT | Performed by: PHYSICAL THERAPIST

## 2019-04-04 PROCEDURE — 97140 MANUAL THERAPY 1/> REGIONS: CPT | Performed by: PHYSICAL THERAPIST

## 2019-04-04 PROCEDURE — 97112 NEUROMUSCULAR REEDUCATION: CPT | Performed by: PHYSICAL THERAPIST

## 2019-04-04 PROCEDURE — 97014 ELECTRIC STIMULATION THERAPY: CPT | Performed by: PHYSICAL THERAPIST

## 2019-04-06 ENCOUNTER — OFFICE VISIT (OUTPATIENT)
Dept: FAMILY MEDICINE CLINIC | Facility: CLINIC | Age: 24
End: 2019-04-06
Payer: COMMERCIAL

## 2019-04-06 VITALS
BODY MASS INDEX: 18.81 KG/M2 | HEIGHT: 69 IN | WEIGHT: 127 LBS | HEART RATE: 76 BPM | DIASTOLIC BLOOD PRESSURE: 52 MMHG | SYSTOLIC BLOOD PRESSURE: 94 MMHG

## 2019-04-06 DIAGNOSIS — R25.2 MUSCLE CRAMPS: ICD-10-CM

## 2019-04-06 DIAGNOSIS — M79.10 MYALGIA: Primary | ICD-10-CM

## 2019-04-06 DIAGNOSIS — F41.1 GENERALIZED ANXIETY DISORDER: ICD-10-CM

## 2019-04-06 PROCEDURE — 99214 OFFICE O/P EST MOD 30 MIN: CPT | Performed by: PHYSICIAN ASSISTANT

## 2019-04-06 PROCEDURE — 3008F BODY MASS INDEX DOCD: CPT | Performed by: PHYSICIAN ASSISTANT

## 2019-04-06 RX ORDER — BUSPIRONE HYDROCHLORIDE 5 MG/1
TABLET ORAL
Qty: 60 TABLET | Refills: 3
Start: 2019-04-06 | End: 2019-05-22

## 2019-04-06 RX ORDER — METHOCARBAMOL 500 MG/1
500 TABLET, FILM COATED ORAL 3 TIMES DAILY
Qty: 30 TABLET | Refills: 2 | Status: SHIPPED | OUTPATIENT
Start: 2019-04-06 | End: 2019-04-09 | Stop reason: SINTOL

## 2019-04-09 ENCOUNTER — OFFICE VISIT (OUTPATIENT)
Dept: FAMILY MEDICINE CLINIC | Facility: CLINIC | Age: 24
End: 2019-04-09
Payer: COMMERCIAL

## 2019-04-09 VITALS
WEIGHT: 127 LBS | HEART RATE: 80 BPM | SYSTOLIC BLOOD PRESSURE: 90 MMHG | BODY MASS INDEX: 18.75 KG/M2 | DIASTOLIC BLOOD PRESSURE: 46 MMHG

## 2019-04-09 DIAGNOSIS — F41.1 GENERALIZED ANXIETY DISORDER: ICD-10-CM

## 2019-04-09 DIAGNOSIS — R22.42 SKIN LUMP OF LEG, LEFT: ICD-10-CM

## 2019-04-09 DIAGNOSIS — J30.9 ALLERGIC RHINITIS, UNSPECIFIED SEASONALITY, UNSPECIFIED TRIGGER: ICD-10-CM

## 2019-04-09 DIAGNOSIS — R14.0 BLOATING: ICD-10-CM

## 2019-04-09 DIAGNOSIS — R10.30 LOWER ABDOMINAL PAIN: Primary | ICD-10-CM

## 2019-04-09 DIAGNOSIS — R42 DIZZINESS: ICD-10-CM

## 2019-04-09 PROBLEM — H69.90 EUSTACHIAN TUBE DYSFUNCTION: Status: RESOLVED | Noted: 2019-02-21 | Resolved: 2019-04-09

## 2019-04-09 PROBLEM — H69.80 EUSTACHIAN TUBE DYSFUNCTION: Status: RESOLVED | Noted: 2019-02-21 | Resolved: 2019-04-09

## 2019-04-09 PROCEDURE — 1036F TOBACCO NON-USER: CPT | Performed by: FAMILY MEDICINE

## 2019-04-09 PROCEDURE — 99214 OFFICE O/P EST MOD 30 MIN: CPT | Performed by: FAMILY MEDICINE

## 2019-04-09 RX ORDER — DICYCLOMINE HYDROCHLORIDE 10 MG/1
10 CAPSULE ORAL EVERY 8 HOURS PRN
Qty: 20 CAPSULE | Refills: 0 | Status: SHIPPED | OUTPATIENT
Start: 2019-04-09 | End: 2022-01-04 | Stop reason: SDUPTHER

## 2019-04-11 ENCOUNTER — OFFICE VISIT (OUTPATIENT)
Dept: PHYSICAL THERAPY | Facility: CLINIC | Age: 24
End: 2019-04-11
Payer: COMMERCIAL

## 2019-04-11 DIAGNOSIS — M54.50 CHRONIC MIDLINE LOW BACK PAIN WITHOUT SCIATICA: Primary | ICD-10-CM

## 2019-04-11 DIAGNOSIS — G89.29 CHRONIC MIDLINE LOW BACK PAIN WITHOUT SCIATICA: Primary | ICD-10-CM

## 2019-04-11 DIAGNOSIS — Z30.41 ENCOUNTER FOR SURVEILLANCE OF CONTRACEPTIVE PILLS: ICD-10-CM

## 2019-04-11 PROCEDURE — 97110 THERAPEUTIC EXERCISES: CPT | Performed by: PHYSICAL THERAPIST

## 2019-04-11 PROCEDURE — 97140 MANUAL THERAPY 1/> REGIONS: CPT | Performed by: PHYSICAL THERAPIST

## 2019-04-11 PROCEDURE — 97112 NEUROMUSCULAR REEDUCATION: CPT | Performed by: PHYSICAL THERAPIST

## 2019-04-11 RX ORDER — LEVONORGESTREL / ETHINYL ESTRADIOL AND ETHINYL ESTRADIOL 150-30(84)
KIT ORAL
Refills: 0 | OUTPATIENT
Start: 2019-04-11

## 2019-04-15 ENCOUNTER — APPOINTMENT (OUTPATIENT)
Dept: PHYSICAL THERAPY | Facility: CLINIC | Age: 24
End: 2019-04-15
Payer: COMMERCIAL

## 2019-04-17 DIAGNOSIS — Z30.41 ENCOUNTER FOR SURVEILLANCE OF CONTRACEPTIVE PILLS: ICD-10-CM

## 2019-04-17 RX ORDER — LEVONORGESTREL / ETHINYL ESTRADIOL AND ETHINYL ESTRADIOL 150-30(84)
1 KIT ORAL DAILY
Qty: 84 EACH | Refills: 3 | Status: SHIPPED | OUTPATIENT
Start: 2019-04-17 | End: 2019-05-08 | Stop reason: SDUPTHER

## 2019-04-18 ENCOUNTER — APPOINTMENT (OUTPATIENT)
Dept: PHYSICAL THERAPY | Facility: CLINIC | Age: 24
End: 2019-04-18
Payer: COMMERCIAL

## 2019-04-18 ENCOUNTER — OFFICE VISIT (OUTPATIENT)
Dept: PHYSICAL THERAPY | Facility: CLINIC | Age: 24
End: 2019-04-18
Payer: COMMERCIAL

## 2019-04-18 DIAGNOSIS — G89.29 CHRONIC MIDLINE LOW BACK PAIN WITHOUT SCIATICA: Primary | ICD-10-CM

## 2019-04-18 DIAGNOSIS — M54.50 CHRONIC MIDLINE LOW BACK PAIN WITHOUT SCIATICA: Primary | ICD-10-CM

## 2019-04-18 PROCEDURE — 97140 MANUAL THERAPY 1/> REGIONS: CPT | Performed by: PHYSICAL THERAPIST

## 2019-04-18 PROCEDURE — 97112 NEUROMUSCULAR REEDUCATION: CPT | Performed by: PHYSICAL THERAPIST

## 2019-04-18 PROCEDURE — 97110 THERAPEUTIC EXERCISES: CPT | Performed by: PHYSICAL THERAPIST

## 2019-04-30 ENCOUNTER — APPOINTMENT (OUTPATIENT)
Dept: PHYSICAL THERAPY | Facility: CLINIC | Age: 24
End: 2019-04-30
Payer: COMMERCIAL

## 2019-05-06 ENCOUNTER — APPOINTMENT (OUTPATIENT)
Dept: PHYSICAL THERAPY | Facility: CLINIC | Age: 24
End: 2019-05-06
Payer: COMMERCIAL

## 2019-05-07 ENCOUNTER — OFFICE VISIT (OUTPATIENT)
Dept: FAMILY MEDICINE CLINIC | Facility: CLINIC | Age: 24
End: 2019-05-07
Payer: COMMERCIAL

## 2019-05-07 VITALS
HEIGHT: 69 IN | HEART RATE: 80 BPM | SYSTOLIC BLOOD PRESSURE: 90 MMHG | DIASTOLIC BLOOD PRESSURE: 52 MMHG | WEIGHT: 127 LBS | BODY MASS INDEX: 18.81 KG/M2

## 2019-05-07 DIAGNOSIS — I95.0 IDIOPATHIC HYPOTENSION: ICD-10-CM

## 2019-05-07 DIAGNOSIS — M79.661 RIGHT CALF PAIN: Primary | ICD-10-CM

## 2019-05-07 PROBLEM — M79.669 CALF PAIN: Status: ACTIVE | Noted: 2019-05-07

## 2019-05-07 PROCEDURE — 99213 OFFICE O/P EST LOW 20 MIN: CPT | Performed by: FAMILY MEDICINE

## 2019-05-07 RX ORDER — CYCLOBENZAPRINE HCL 5 MG
TABLET ORAL
Refills: 2 | COMMUNITY
Start: 2019-04-17 | End: 2019-07-01 | Stop reason: ALTCHOICE

## 2019-05-07 RX ORDER — ADAPALENE 3 MG/G
GEL TOPICAL
COMMUNITY
End: 2019-08-09

## 2019-05-07 RX ORDER — DULOXETIN HYDROCHLORIDE 20 MG/1
20 CAPSULE, DELAYED RELEASE ORAL 2 TIMES DAILY
COMMUNITY
Start: 2019-05-06 | End: 2019-07-01 | Stop reason: SDUPTHER

## 2019-05-07 RX ORDER — SODIUM FLUORIDE 5 MG/ML
PASTE, DENTIFRICE DENTAL
COMMUNITY
End: 2020-02-20 | Stop reason: ALTCHOICE

## 2019-05-08 ENCOUNTER — ANNUAL EXAM (OUTPATIENT)
Dept: OBGYN CLINIC | Facility: MEDICAL CENTER | Age: 24
End: 2019-05-08
Payer: COMMERCIAL

## 2019-05-08 VITALS
HEIGHT: 69 IN | DIASTOLIC BLOOD PRESSURE: 76 MMHG | WEIGHT: 129.9 LBS | BODY MASS INDEX: 19.24 KG/M2 | SYSTOLIC BLOOD PRESSURE: 118 MMHG

## 2019-05-08 DIAGNOSIS — N60.12 FIBROCYSTIC BREAST CHANGES OF BOTH BREASTS: ICD-10-CM

## 2019-05-08 DIAGNOSIS — Z30.41 ENCOUNTER FOR SURVEILLANCE OF CONTRACEPTIVE PILLS: ICD-10-CM

## 2019-05-08 DIAGNOSIS — N60.11 FIBROCYSTIC BREAST CHANGES OF BOTH BREASTS: ICD-10-CM

## 2019-05-08 DIAGNOSIS — Z01.419 ENCOUNTER FOR WELL WOMAN EXAM WITH ROUTINE GYNECOLOGICAL EXAM: Primary | ICD-10-CM

## 2019-05-08 PROCEDURE — 99395 PREV VISIT EST AGE 18-39: CPT | Performed by: OBSTETRICS & GYNECOLOGY

## 2019-05-08 RX ORDER — LEVONORGESTREL / ETHINYL ESTRADIOL AND ETHINYL ESTRADIOL 150-30(84)
1 KIT ORAL DAILY
Qty: 84 EACH | Refills: 3 | Status: SHIPPED | OUTPATIENT
Start: 2019-05-08 | End: 2019-11-25 | Stop reason: SDUPTHER

## 2019-05-08 RX ORDER — RANITIDINE 150 MG/1
150 TABLET ORAL DAILY
COMMUNITY
End: 2020-02-20 | Stop reason: ALTCHOICE

## 2019-05-08 RX ORDER — DIPHENOXYLATE HYDROCHLORIDE AND ATROPINE SULFATE 2.5; .025 MG/1; MG/1
1 TABLET ORAL DAILY
COMMUNITY
End: 2019-05-22

## 2019-05-09 ENCOUNTER — HOSPITAL ENCOUNTER (OUTPATIENT)
Dept: NON INVASIVE DIAGNOSTICS | Facility: HOSPITAL | Age: 24
Discharge: HOME | End: 2019-05-09
Payer: COMMERCIAL

## 2019-05-09 DIAGNOSIS — M79.661 RIGHT CALF PAIN: ICD-10-CM

## 2019-05-09 PROCEDURE — 93971 EXTREMITY STUDY: CPT

## 2019-05-09 PROCEDURE — 93971 EXTREMITY STUDY: CPT | Performed by: SURGERY

## 2019-05-10 ENCOUNTER — HOSPITAL ENCOUNTER (OUTPATIENT)
Dept: ULTRASOUND IMAGING | Facility: CLINIC | Age: 24
Discharge: HOME | End: 2019-05-10
Payer: COMMERCIAL

## 2019-05-10 DIAGNOSIS — N64.4 BREAST PAIN, LEFT: ICD-10-CM

## 2019-05-10 PROCEDURE — 76642 ULTRASOUND BREAST LIMITED: CPT

## 2019-05-15 ENCOUNTER — OFFICE VISIT (OUTPATIENT)
Dept: PHYSICAL THERAPY | Facility: CLINIC | Age: 24
End: 2019-05-15
Payer: COMMERCIAL

## 2019-05-15 DIAGNOSIS — G89.29 CHRONIC MIDLINE LOW BACK PAIN WITHOUT SCIATICA: Primary | ICD-10-CM

## 2019-05-15 DIAGNOSIS — M54.50 CHRONIC MIDLINE LOW BACK PAIN WITHOUT SCIATICA: Primary | ICD-10-CM

## 2019-05-15 PROCEDURE — 97014 ELECTRIC STIMULATION THERAPY: CPT

## 2019-05-15 PROCEDURE — 97140 MANUAL THERAPY 1/> REGIONS: CPT

## 2019-05-15 PROCEDURE — 97112 NEUROMUSCULAR REEDUCATION: CPT

## 2019-05-15 PROCEDURE — 97110 THERAPEUTIC EXERCISES: CPT

## 2019-05-22 ENCOUNTER — OFFICE VISIT (OUTPATIENT)
Dept: FAMILY MEDICINE CLINIC | Facility: CLINIC | Age: 24
End: 2019-05-22
Payer: COMMERCIAL

## 2019-05-22 VITALS
BODY MASS INDEX: 18.57 KG/M2 | SYSTOLIC BLOOD PRESSURE: 114 MMHG | HEIGHT: 69 IN | DIASTOLIC BLOOD PRESSURE: 60 MMHG | WEIGHT: 125.4 LBS

## 2019-05-22 DIAGNOSIS — R00.2 PALPITATIONS: Primary | ICD-10-CM

## 2019-05-22 DIAGNOSIS — Z30.41 ENCOUNTER FOR SURVEILLANCE OF CONTRACEPTIVE PILLS: ICD-10-CM

## 2019-05-22 DIAGNOSIS — F41.1 GENERALIZED ANXIETY DISORDER: ICD-10-CM

## 2019-05-22 PROCEDURE — 99214 OFFICE O/P EST MOD 30 MIN: CPT | Performed by: FAMILY MEDICINE

## 2019-05-22 PROCEDURE — 93000 ELECTROCARDIOGRAM COMPLETE: CPT | Performed by: FAMILY MEDICINE

## 2019-05-22 RX ORDER — HYDROXYZINE HYDROCHLORIDE 25 MG/1
25 TABLET, FILM COATED ORAL EVERY 8 HOURS PRN
Qty: 30 TABLET | Refills: 0 | Status: SHIPPED | OUTPATIENT
Start: 2019-05-22 | End: 2019-10-13 | Stop reason: SDUPTHER

## 2019-05-23 ENCOUNTER — APPOINTMENT (OUTPATIENT)
Dept: PHYSICAL THERAPY | Facility: CLINIC | Age: 24
End: 2019-05-23
Payer: COMMERCIAL

## 2019-06-03 ENCOUNTER — OFFICE VISIT (OUTPATIENT)
Dept: PHYSICAL THERAPY | Facility: CLINIC | Age: 24
End: 2019-06-03
Payer: COMMERCIAL

## 2019-06-03 DIAGNOSIS — M54.50 CHRONIC MIDLINE LOW BACK PAIN WITHOUT SCIATICA: Primary | ICD-10-CM

## 2019-06-03 DIAGNOSIS — G89.29 CHRONIC MIDLINE LOW BACK PAIN WITHOUT SCIATICA: Primary | ICD-10-CM

## 2019-06-03 PROCEDURE — 97112 NEUROMUSCULAR REEDUCATION: CPT | Performed by: PHYSICAL THERAPIST

## 2019-06-03 PROCEDURE — 97110 THERAPEUTIC EXERCISES: CPT | Performed by: PHYSICAL THERAPIST

## 2019-06-05 ENCOUNTER — HOSPITAL ENCOUNTER (OUTPATIENT)
Dept: NON INVASIVE DIAGNOSTICS | Facility: HOSPITAL | Age: 24
Discharge: HOME | End: 2019-06-05
Payer: COMMERCIAL

## 2019-06-05 DIAGNOSIS — R00.2 PALPITATIONS: ICD-10-CM

## 2019-06-05 PROCEDURE — 93225 XTRNL ECG REC<48 HRS REC: CPT

## 2019-06-05 PROCEDURE — 93226 XTRNL ECG REC<48 HR SCAN A/R: CPT

## 2019-06-10 ENCOUNTER — APPOINTMENT (OUTPATIENT)
Dept: PHYSICAL THERAPY | Facility: CLINIC | Age: 24
End: 2019-06-10
Payer: COMMERCIAL

## 2019-06-10 ENCOUNTER — OFFICE VISIT (OUTPATIENT)
Dept: FAMILY MEDICINE CLINIC | Facility: CLINIC | Age: 24
End: 2019-06-10
Payer: COMMERCIAL

## 2019-06-10 VITALS
HEIGHT: 69 IN | WEIGHT: 125 LBS | SYSTOLIC BLOOD PRESSURE: 102 MMHG | HEART RATE: 68 BPM | DIASTOLIC BLOOD PRESSURE: 64 MMHG | RESPIRATION RATE: 16 BRPM | BODY MASS INDEX: 18.51 KG/M2

## 2019-06-10 DIAGNOSIS — R00.2 PALPITATIONS: Primary | ICD-10-CM

## 2019-06-10 DIAGNOSIS — K21.9 GASTROESOPHAGEAL REFLUX DISEASE, ESOPHAGITIS PRESENCE NOT SPECIFIED: ICD-10-CM

## 2019-06-10 PROCEDURE — 93227 XTRNL ECG REC<48 HR R&I: CPT | Performed by: INTERNAL MEDICINE

## 2019-06-10 PROCEDURE — 99213 OFFICE O/P EST LOW 20 MIN: CPT | Performed by: FAMILY MEDICINE

## 2019-06-14 DIAGNOSIS — K21.9 GASTROESOPHAGEAL REFLUX DISEASE, ESOPHAGITIS PRESENCE NOT SPECIFIED: ICD-10-CM

## 2019-06-15 RX ORDER — LANSOPRAZOLE 30 MG/1
30 CAPSULE, DELAYED RELEASE ORAL DAILY
Qty: 30 CAPSULE | Refills: 2 | Status: SHIPPED | OUTPATIENT
Start: 2019-06-15 | End: 2020-04-21 | Stop reason: ALTCHOICE

## 2019-06-18 ENCOUNTER — TELEPHONE (OUTPATIENT)
Dept: FAMILY MEDICINE CLINIC | Facility: CLINIC | Age: 24
End: 2019-06-18

## 2019-06-26 ENCOUNTER — OFFICE VISIT (OUTPATIENT)
Dept: PHYSICAL THERAPY | Facility: CLINIC | Age: 24
End: 2019-06-26
Payer: COMMERCIAL

## 2019-06-26 ENCOUNTER — TRANSCRIBE ORDERS (OUTPATIENT)
Dept: PHYSICAL THERAPY | Facility: CLINIC | Age: 24
End: 2019-06-26

## 2019-06-26 DIAGNOSIS — M54.2 NECK PAIN: Primary | ICD-10-CM

## 2019-06-26 DIAGNOSIS — M54.6 MIDLINE THORACIC BACK PAIN, UNSPECIFIED CHRONICITY: ICD-10-CM

## 2019-06-26 PROCEDURE — 97112 NEUROMUSCULAR REEDUCATION: CPT | Performed by: PHYSICAL THERAPIST

## 2019-06-26 PROCEDURE — 97161 PT EVAL LOW COMPLEX 20 MIN: CPT | Performed by: PHYSICAL THERAPIST

## 2019-07-01 ENCOUNTER — OFFICE VISIT (OUTPATIENT)
Dept: FAMILY MEDICINE CLINIC | Facility: CLINIC | Age: 24
End: 2019-07-01
Payer: COMMERCIAL

## 2019-07-01 ENCOUNTER — TELEPHONE (OUTPATIENT)
Dept: NEUROLOGY | Facility: CLINIC | Age: 24
End: 2019-07-01

## 2019-07-01 VITALS
BODY MASS INDEX: 18.22 KG/M2 | WEIGHT: 123 LBS | HEART RATE: 100 BPM | DIASTOLIC BLOOD PRESSURE: 70 MMHG | SYSTOLIC BLOOD PRESSURE: 112 MMHG | HEIGHT: 69 IN

## 2019-07-01 DIAGNOSIS — H55.00 NYSTAGMUS: ICD-10-CM

## 2019-07-01 DIAGNOSIS — H65.03 NON-RECURRENT ACUTE SEROUS OTITIS MEDIA OF BOTH EARS: ICD-10-CM

## 2019-07-01 DIAGNOSIS — M79.10 MYALGIA: ICD-10-CM

## 2019-07-01 DIAGNOSIS — R25.2 MUSCLE CRAMPS: Primary | ICD-10-CM

## 2019-07-01 PROCEDURE — 1036F TOBACCO NON-USER: CPT | Performed by: FAMILY MEDICINE

## 2019-07-01 PROCEDURE — 99214 OFFICE O/P EST MOD 30 MIN: CPT | Performed by: FAMILY MEDICINE

## 2019-07-01 PROCEDURE — 3008F BODY MASS INDEX DOCD: CPT | Performed by: FAMILY MEDICINE

## 2019-07-01 RX ORDER — DULOXETIN HYDROCHLORIDE 20 MG/1
20 CAPSULE, DELAYED RELEASE ORAL DAILY
Start: 2019-07-01 | End: 2019-07-09

## 2019-07-01 RX ORDER — CEFUROXIME AXETIL 500 MG/1
500 TABLET ORAL EVERY 12 HOURS SCHEDULED
Qty: 20 TABLET | Refills: 0 | Status: SHIPPED | OUTPATIENT
Start: 2019-07-01 | End: 2019-07-11

## 2019-07-01 RX ORDER — TIZANIDINE 2 MG/1
2 TABLET ORAL
Refills: 2 | COMMUNITY
Start: 2019-06-21 | End: 2020-05-27 | Stop reason: SDUPTHER

## 2019-07-01 RX ORDER — ALPRAZOLAM 0.25 MG/1
0.25 TABLET ORAL 3 TIMES DAILY
Refills: 0 | COMMUNITY
Start: 2019-06-22 | End: 2019-10-14

## 2019-07-01 RX ORDER — FLUOXETINE HYDROCHLORIDE 20 MG/1
20 CAPSULE ORAL DAILY
Refills: 0 | COMMUNITY
Start: 2019-06-22 | End: 2019-08-09

## 2019-07-01 NOTE — ASSESSMENT & PLAN NOTE
Patient does have a slight nystagmus when looking to the right  Question if this is related to vertigo symptoms or infection  At this point, will recommend antibiotics, and then follow with after that  Consider the Neurology follow-up in September, but I would like to see if we can move that sooner  If there is no change after the antibiotics, consider ENT

## 2019-07-01 NOTE — PROGRESS NOTES
Assessment and Plan:    Problem List Items Addressed This Visit     Muscle cramps - Primary     She has increased cramps and irritation  Treat with ABX, and consider seeing Neuro  Relevant Medications    DULoxetine (CYMBALTA) 20 mg capsule    Myalgia     Question if due to FMS  Follow with Psych and neuro and Rheum  Relevant Medications    DULoxetine (CYMBALTA) 20 mg capsule    Nystagmus     Patient does have a slight nystagmus when looking to the right  Question if this is related to vertigo symptoms or infection  At this point, will recommend antibiotics, and then follow with after that  Consider the Neurology follow-up in September, but I would like to see if we can move that sooner  If there is no change after the antibiotics, consider ENT  Relevant Medications    cefuroxime (CEFTIN) 500 mg tablet      Other Visit Diagnoses     Non-recurrent acute serous otitis media of both ears        ? if cause of nystagmus, and tremors  Add Ceftin  OCP's and ABX reviewed  Relevant Medications    cefuroxime (CEFTIN) 500 mg tablet                 Diagnoses and all orders for this visit:    Muscle cramps  -     DULoxetine (CYMBALTA) 20 mg capsule; Take 1 capsule (20 mg total) by mouth daily    Myalgia  -     DULoxetine (CYMBALTA) 20 mg capsule; Take 1 capsule (20 mg total) by mouth daily    Nystagmus  -     cefuroxime (CEFTIN) 500 mg tablet; Take 1 tablet (500 mg total) by mouth every 12 (twelve) hours for 10 days    Non-recurrent acute serous otitis media of both ears  Comments:  ? if cause of nystagmus, and tremors  Add Ceftin  OCP's and ABX reviewed  Orders:  -     cefuroxime (CEFTIN) 500 mg tablet; Take 1 tablet (500 mg total) by mouth every 12 (twelve) hours for 10 days    Other orders  -     ALPRAZolam (XANAX) 0 25 mg tablet; Take 0 25 mg by mouth 3 (three) times a day  -     FLUoxetine (PROzac) 20 mg capsule; Take 20 mg by mouth daily  -     tiZANidine (ZANAFLEX) 2 mg tablet;  Take 2 mg by mouth daily at bedtime              Subjective:      Patient ID: Amando Gaines is a 21 y o  female  CC:    Chief Complaint   Patient presents with    Spasms     "muscle twitching"    Tingling    Shaking     Onset 3 weeks  mjs    Saw a Psychiatrist 1 week ago  Was started on new meds  HPI:    Patient has been having a significant amount of tremors lately  She noticed it about 2-3 weeks ago  Staying about the same, not getting worse  Does not vary throughout the day  Worse with caffeine intake  Last week, patient saw Psychiatry and medications were changed  Patient is currently on Cymbalta, and p r n  Atarax  Cymbalta was from Rheumatology  Psychiatry started Prozac, but the patient has not started yet  Patient reports she did seem Rheumatology last week, they felt that she should go to Neurology  Patient was on 40mg Cymbalta, but then noticed that she started having some of the tremors  Since then, she decrease to 20 mg  She has not noticed an improvement in the symptoms, however  No palpitations  She was having GI symptoms before, but that is better when the Cymbalta was reduced to 20mg  Appetite was worse on 40mg, ie not interested  On 20mg, she is better with wanting to eat  Some neck pains at times, but not really new  She is in PT for FMS  Tremors:  She notes it at times with legs and uncontrolable movements  She had some in arms as well  The following portions of the patient's history were reviewed and updated as appropriate: allergies, current medications, past family history, past medical history, past social history, past surgical history and problem list       Review of Systems   Constitutional: Negative  HENT:        Per HPI   Eyes: Negative  Respiratory: Negative  Cardiovascular: Negative  Gastrointestinal: Negative  All other systems reviewed and are negative          Data to review:       Objective:    Vitals:    07/01/19 1550 BP: 112/70   Pulse: 100   Weight: 55 8 kg (123 lb)   Height: 5' 8 5" (1 74 m)        Physical Exam   Constitutional: She appears well-developed and well-nourished  HENT:   Head: Normocephalic and atraumatic  Cardiovascular: Normal rate, regular rhythm and normal heart sounds  Pulses:       Carotid pulses are 2+ on the right side, and 2+ on the left side  Pulmonary/Chest: Effort normal and breath sounds normal  She has no wheezes  She has no rales  She exhibits no tenderness  Neurological: She is alert  She has normal strength  No cranial nerve deficit or sensory deficit  She displays a negative Romberg sign  nystagmus noted when looking to the right  Nursing note and vitals reviewed

## 2019-07-01 NOTE — PATIENT INSTRUCTIONS
Problem List Items Addressed This Visit     Muscle cramps - Primary     She has increased cramps and irritation  Treat with ABX, and consider seeing Neuro  Relevant Medications    DULoxetine (CYMBALTA) 20 mg capsule    Myalgia     Question if due to FMS  Follow with Psych and neuro and Rheum  Relevant Medications    DULoxetine (CYMBALTA) 20 mg capsule    Nystagmus     Patient does have a slight nystagmus when looking to the right  Question if this is related to vertigo symptoms or infection  At this point, will recommend antibiotics, and then follow with after that  Consider the Neurology follow-up in September, but I would like to see if we can move that sooner  If there is no change after the antibiotics, consider ENT  Relevant Medications    cefuroxime (CEFTIN) 500 mg tablet      Other Visit Diagnoses     Non-recurrent acute serous otitis media of both ears        ? if cause of nystagmus, and tremors  Add Ceftin  OCP's and ABX reviewed        Relevant Medications    cefuroxime (CEFTIN) 500 mg tablet

## 2019-07-09 ENCOUNTER — OFFICE VISIT (OUTPATIENT)
Dept: FAMILY MEDICINE CLINIC | Facility: CLINIC | Age: 24
End: 2019-07-09
Payer: COMMERCIAL

## 2019-07-09 VITALS
DIASTOLIC BLOOD PRESSURE: 58 MMHG | WEIGHT: 121 LBS | BODY MASS INDEX: 17.92 KG/M2 | HEART RATE: 76 BPM | SYSTOLIC BLOOD PRESSURE: 90 MMHG | HEIGHT: 69 IN

## 2019-07-09 DIAGNOSIS — R25.2 MUSCLE CRAMPS: ICD-10-CM

## 2019-07-09 DIAGNOSIS — R00.2 PALPITATIONS: ICD-10-CM

## 2019-07-09 DIAGNOSIS — H65.03 NON-RECURRENT ACUTE SEROUS OTITIS MEDIA OF BOTH EARS: Primary | ICD-10-CM

## 2019-07-09 DIAGNOSIS — H55.00 NYSTAGMUS: ICD-10-CM

## 2019-07-09 DIAGNOSIS — I95.0 IDIOPATHIC HYPOTENSION: ICD-10-CM

## 2019-07-09 DIAGNOSIS — F41.1 GENERALIZED ANXIETY DISORDER: ICD-10-CM

## 2019-07-09 PROBLEM — K12.0 APHTHOUS ULCER: Status: ACTIVE | Noted: 2019-07-09

## 2019-07-09 PROCEDURE — 92567 TYMPANOMETRY: CPT | Performed by: FAMILY MEDICINE

## 2019-07-09 PROCEDURE — 99214 OFFICE O/P EST MOD 30 MIN: CPT | Performed by: FAMILY MEDICINE

## 2019-07-09 NOTE — PROGRESS NOTES
Assessment and Plan:    Problem List Items Addressed This Visit     Generalized anxiety disorder     Patient is having some worsening of her symptoms with anxiety, more specifically it seems that the Xanax is not controlling the symptoms well, along with the Prozac  Tremors are the biggest issue currently  Consider 1/2 tablet of hydroxyzine in the morning as needed  Unfortunately, when she took the full tablet she had more sedation  He did seem to cover some of the anxiety symptoms she was having before, but they are not present now  The only issue is the tremors and tingles  Idiopathic hypotension     Patient's systolic blood pressures again low  No orthostasis, however  Follow-up as needed  Increased water intake  Muscle cramps     Despite having been on antibiotics, the cramping and tingling have continued  I would recommend follow with Neurology at this point  Relevant Orders    Ambulatory referral to Neurology    Nystagmus     Resolved on exam today  Relevant Orders    Ambulatory referral to Neurology    Palpitations     No current palpitations  Doing quite well  Other Visit Diagnoses     Non-recurrent acute serous otitis media of both ears    -  Primary                 Diagnoses and all orders for this visit:    Non-recurrent acute serous otitis media of both ears    Generalized anxiety disorder    Muscle cramps  -     Ambulatory referral to Neurology; Future    Palpitations    Nystagmus  -     Ambulatory referral to Neurology; Future    Idiopathic hypotension              Subjective:      Patient ID: Della June is a 21 y o  female  CC:    Chief Complaint   Patient presents with    Follow-up     patient is here for a 2 week f/u re: bilateral serous OM, myalgia, muscle cramps  Patient states she doesnt feel the antibiotic has helped at all  Patient still c/o congestion in her ears and a sinus headache, shaky, jittery, twitching in her feet   ak HPI:    Patient is still feeling tingling all over  This despite using the Ceftin  She has not noticed any improvement  With regard to the nystagmus, she has not noticed as much problem lately with that  Patient is feeling a significant amount of tremors  She feels shaky    She notices that it is worse when she has coffee  It feels very similar to when she has too much coffee, but at this point she is not drinking large amount, and is just significantly worse when it starts to happen  Still has some tingling down the arms and in most extremities  Patient has been having increased anxiety since going off Cymbalta  She did notice that the muscle burning is also coming back  She is now back on to Prozac at 20 mg daily  Started Monday  Using Xanax  It seems to just make her tired  It did not change the shakiness  Still getting sinus symptoms as well  The following portions of the patient's history were reviewed and updated as appropriate: allergies, current medications, past family history, past medical history, past social history, past surgical history and problem list       Review of Systems   Constitutional: Negative  HENT: Negative  Eyes: Negative  Respiratory: Negative  Cardiovascular: Negative  Negative for palpitations  Gastrointestinal: Negative  Endocrine: Negative  Genitourinary: Negative  Musculoskeletal: Negative  Skin: Negative  Allergic/Immunologic: Negative  Neurological: Positive for tremors and numbness (tingles, not numbness)  Hematological: Negative  Psychiatric/Behavioral: Negative  Negative for sleep disturbance and suicidal ideas  The patient is not nervous/anxious  Data to review:       Objective:    Vitals:    07/09/19 1545   BP: 90/58   Pulse: 76   Weight: 54 9 kg (121 lb)   Height: 5' 8 5" (1 74 m)        Physical Exam   Constitutional: She appears well-developed and well-nourished     HENT:   Head: Normocephalic and atraumatic  Right Ear: Hearing, tympanic membrane, external ear and ear canal normal    Left Ear: Hearing, tympanic membrane, external ear and ear canal normal    Nose: Nose normal    Mouth/Throat: Oropharynx is clear and moist    Cardiovascular: Normal rate, regular rhythm and normal heart sounds  Pulses:       Carotid pulses are 2+ on the right side, and 2+ on the left side  Pulmonary/Chest: Effort normal and breath sounds normal  She has no wheezes  She has no rales  She exhibits no tenderness  Lymphadenopathy:     She has no cervical adenopathy  Neurological: She is alert  She has normal strength and normal reflexes  No cranial nerve deficit or sensory deficit  Psychiatric: She has a normal mood and affect  Her behavior is normal  Judgment and thought content normal    Nursing note and vitals reviewed  Tympanometry     Date/Time 7/9/2019 4:20 PM     Performed by  Melina Maria MD     Authorized by Melina Maria MD       Consent: Verbal consent obtained  Procedure Details   Procedure Notes: Tympanogram was performed  Patient tolerated procedure  Please see scanned results

## 2019-07-09 NOTE — ASSESSMENT & PLAN NOTE
Despite having been on antibiotics, the cramping and tingling have continued  I would recommend follow with Neurology at this point

## 2019-07-09 NOTE — ASSESSMENT & PLAN NOTE
Patient is having some worsening of her symptoms with anxiety, more specifically it seems that the Xanax is not controlling the symptoms well, along with the Prozac  Tremors are the biggest issue currently  Consider 1/2 tablet of hydroxyzine in the morning as needed  Unfortunately, when she took the full tablet she had more sedation  He did seem to cover some of the anxiety symptoms she was having before, but they are not present now  The only issue is the tremors and tingles

## 2019-07-09 NOTE — ASSESSMENT & PLAN NOTE
Patient's systolic blood pressures again low  No orthostasis, however  Follow-up as needed  Increased water intake

## 2019-07-09 NOTE — PATIENT INSTRUCTIONS
Problem List Items Addressed This Visit     Generalized anxiety disorder     Patient is having some worsening of her symptoms with anxiety, more specifically it seems that the Xanax is not controlling the symptoms well, along with the Prozac  Tremors are the biggest issue currently  Consider 1/2 tablet of hydroxyzine in the morning as needed  Unfortunately, when she took the full tablet she had more sedation  He did seem to cover some of the anxiety symptoms she was having before, but they are not present now  The only issue is the tremors and tingles  Idiopathic hypotension     Patient's systolic blood pressures again low  No orthostasis, however  Follow-up as needed  Increased water intake  Muscle cramps     Despite having been on antibiotics, the cramping and tingling have continued  I would recommend follow with Neurology at this point  Relevant Orders    Ambulatory referral to Neurology    Nystagmus     Resolved on exam today  Relevant Orders    Ambulatory referral to Neurology    Palpitations     No current palpitations  Doing quite well             Other Visit Diagnoses     Non-recurrent acute serous otitis media of both ears    -  Primary

## 2019-08-09 ENCOUNTER — CONSULT (OUTPATIENT)
Dept: NEUROLOGY | Facility: CLINIC | Age: 24
End: 2019-08-09
Payer: COMMERCIAL

## 2019-08-09 VITALS
HEART RATE: 89 BPM | BODY MASS INDEX: 17.77 KG/M2 | WEIGHT: 120 LBS | SYSTOLIC BLOOD PRESSURE: 90 MMHG | DIASTOLIC BLOOD PRESSURE: 57 MMHG | HEIGHT: 69 IN

## 2019-08-09 DIAGNOSIS — R20.2 PARESTHESIA: Primary | ICD-10-CM

## 2019-08-09 DIAGNOSIS — E53.8 LOW SERUM VITAMIN B12: ICD-10-CM

## 2019-08-09 DIAGNOSIS — H55.00 NYSTAGMUS: ICD-10-CM

## 2019-08-09 DIAGNOSIS — R25.2 MUSCLE CRAMPS: ICD-10-CM

## 2019-08-09 DIAGNOSIS — H04.129 EYE DRYNESS: ICD-10-CM

## 2019-08-09 PROCEDURE — 99245 OFF/OP CONSLTJ NEW/EST HI 55: CPT | Performed by: PSYCHIATRY & NEUROLOGY

## 2019-08-09 RX ORDER — DIAZEPAM 5 MG/1
TABLET ORAL
Qty: 2 TABLET | Refills: 0 | OUTPATIENT
Start: 2019-08-09 | End: 2019-08-28

## 2019-08-09 RX ORDER — IMIPRAMINE HCL 50 MG
50 TABLET ORAL
Refills: 0 | COMMUNITY
Start: 2019-08-02 | End: 2020-02-20 | Stop reason: ALTCHOICE

## 2019-08-09 RX ORDER — ALPRAZOLAM 0.5 MG/1
0.5 TABLET ORAL 3 TIMES DAILY PRN
Refills: 0 | COMMUNITY
Start: 2019-08-02 | End: 2020-04-21 | Stop reason: ALTCHOICE

## 2019-08-09 NOTE — PROGRESS NOTES
[facility]ULTIPLE SCLER[facility]ENTER  PATIENT:  Abhijeet Sevilla  MRN:  [id number]  :  1995  DATE OF SERVICE:  8/10/2019    Assessment/Plan:     Problem List Items Addressed This Visit        Other    Muscle cramps    Relevant Orders    EMG 1 Upper/1 Lower Neuropathy    CK (with reflex to MB)    Aldolase    MRI brain MS wo and w contrast    MRI cervical spine with and without contrast    Nystagmus      Other Visit Diagnoses     Paresthesia    -  Primary    Relevant Medications    diazepam (VALIUM) 5 mg tablet    Other Relevant Orders    Vitamin B12    EMG 1 Upper/1 Lower Neuropathy    MRI brain MS wo and w contrast    MRI cervical spine with and without contrast    Low serum vitamin B12        Relevant Orders    Vitamin B12    Eye dryness        Relevant Orders    Sjogren's Antibodies           Ms Jorge Luis Anthony has presented for evaluation of diffuse muscle pain and fasciculation with paresthesia  Patient has been working with rheumatology and psychiatry to help with her symptoms and duloxetine migth be providing some benefits  I advised follow Dr João Hernández recommendations with starting amitriptyline as it may help with neuropathic pain as well  I agree with a goal of ruling out MS - MRI B/C w/wo will be advised and EMG/NCS will help with peripheral nerve dysfunction evaluation  Blood work will be added as well  Vision therapy for nystagmus might be considered  Patient is to follow in 2-3 months with [facility]  Subjective:muscle pain and sensory disturbances     HPI History of Present Illness    Mrs Jorge Luis Anthony is a 20 yo female who was referred to [place] [address] for evaluation of full body nerve tingling   Patient has a history of chest pain, Vitamin D deficiency, KEVAN, eustachian tube dysfunction, myalgia, left ear pain, insomnia, chronic bilateral back pain with no sciatica, epigastric pain, breast pain, fibromyalgia on Cymbalta; low abdominal pain, right calf pain, neck pain, mid thoracic pain; Patient works with [employer] for postural deficits, she has poor scapular stability; Patient reports neck and thoracic pain beginning appx 3 months ago with no apparent cause for onset  She was previously seen at this facility for low back pain  She saw a rheumatologist who diagnosed her with fibromyalgia  Patient was complaining of muscle spasms and nystagmus  CT head : Normal unenhanced CT scan of the brain  There is no acute intracranial  hemorrhage  No acute fracture  CT cervical region:   Reversal of cervical curvature  No acute fracture  No traumatic subluxation  Electrical shock feeling and muscle twitching through her body, face, trunk and extremities  Patient has more feeling in her thighs and not obviously  Patient started 1 month after starting Cymbalta , and after discontinuing cymbalta 20 mg  Pain had gotten worse  Horse ride and patient had a fall with 2 fractures in pelvic area  Burning chest pain and cardiac work up completed  Dr Hiral Muhammad started working with the patient, amitriptyline and xanax was advise  CK was normal in 2/2019  No bladder dysfunction, no weakness; patient had gas and upper GI issues with duloxetine  No vision loss and no double vision  GF had CVA, no neurologic conditon s    The following portions of the patient's history were reviewed and updated as appropriate:   She  has a past medical history of Allergic, Anxiety, Eczema, Fracture of pelvis ([address] ), and Jaw fracture ([address] )    She   Patient Active Problem List    Diagnosis Date Noted    Aphthous ulcer 07/09/2019    Nystagmus 07/01/2019    GERD (gastroesophageal reflux disease) 06/10/2019    Palpitations 05/22/2019    Calf pain 05/07/2019    Idiopathic hypotension 05/07/2019    Lower abdominal pain 04/09/2019    Bloating 04/09/2019    Dizziness 04/09/2019    Skin lump of leg, left 04/09/2019    Muscle cramps 04/06/2019    Encounter for surveillance of contraceptives 03/25/2019    Vitamin D deficiency 02/21/2019    Myalgia 02/21/2019    Other chest pain 11/28/2018    Abnormal EKG 11/28/2018    Chronic bilateral low back pain without sciatica 09/25/2018    Elevated LDL cholesterol level 09/25/2018    Fatigue 03/15/2018    Generalized anxiety disorder 03/01/2017    TMJ syndrome 03/01/2017    Allergic rhinitis 03/19/2015    Menstrual irregularity 07/29/2014    Acne 03/03/2014     She  has a past surgical history that includes [place] tooth extraction and Skin biopsy  Her family history includes Coronary artery disease in her mother; Diabetes in her maternal grandfather; Hernia in her father; Hyperlipidemia in her mother; Hypothyroidism in her mother; No Known Problems in her maternal aunt, maternal grandmother, paternal aunt, and paternal grandmother  She  reports that she has never smoked  She has never used smokeless tobacco  She reports that she drinks alcohol  She reports that she does not use drugs    Current Outpatient Medications   Medication Sig Dispense Refill    ALPRAZolam (XANAX) 0 25 mg tablet Take 0 25 mg by mouth 3 (three) times a day  0    ALPRAZolam (XANAX) 0 5 mg tablet Take 0 5 mg by mouth daily at bedtime  0    cetirizine (ZyrTEC) 10 mg tablet Take 10 mg by mouth daily      Cholecalciferol (VITAMIN D-3) 5000 units TABS Take 1 tablet daily 1 tablet 0    dicyclomine (BENTYL) 10 mg capsule Take 1 capsule (10 mg total) by mouth every 8 (eight) hours as needed (Abdominal pain) 20 capsule 0    fluticasone (FLONASE ALLERGY RELIEF) 50 mcg/act nasal spray into each nostril as needed       hydrOXYzine HCL (ATARAX) 25 mg tablet Take 1 tablet (25 mg total) by mouth every 8 (eight) hours as needed for anxiety for up to 30 days 30 tablet 0    lansoprazole (PREVACID) 30 mg capsule Take 1 capsule (30 mg total) by mouth daily 30 capsule 2    Levonorgest-Eth [address]-Day (DAYSEE) 0 15-0 03 &0 01 MG TABS Take 1 tablet by mouth daily 84 each 3    Multiple Vitamins-Minerals (HAIR SKIN AND NAILS FORMULA PO) Take by mouth      ranitidine (ZANTAC) 150 mg tablet Take 150 mg by mouth daily       SODIUM FLUORIDE, DENTAL GEL, (DENTA 5000 PLUS) 1 1 % CREA Denta 5000 Plus 1 1 % cream      tiZANidine (ZANAFLEX) 2 mg tablet Take 2 mg by mouth daily at bedtime  2    triamcinolone (KENALOG) 0 1 % cream APPLY SMALL AMOUNT TWICE A DAY X 2 WEEKS TO THE B/L HANDS AND ELBOW THEN AS NEEDED FOR FLARES  0    diazepam (VALIUM) 5 mg tablet Take 1 tab 40 min prior to imaging with a second 10 min prior to MRI 2 tablet 0    imipramine (TOFRANIL) 50 mg tablet Take 50 mg by mouth daily at bedtime  0     No current facility-administered medications for this visit        Current Outpatient Medications on File Prior to Visit   Medication Sig    ALPRAZolam (XANAX) 0 25 mg tablet Take 0 25 mg by mouth 3 (three) times a day    ALPRAZolam (XANAX) 0 5 mg tablet Take 0 5 mg by mouth daily at bedtime    cetirizine (ZyrTEC) 10 mg tablet Take 10 mg by mouth daily    Cholecalciferol (VITAMIN D-3) 5000 units TABS Take 1 tablet daily    dicyclomine (BENTYL) 10 mg capsule Take 1 capsule (10 mg total) by mouth every 8 (eight) hours as needed (Abdominal pain)    fluticasone (FLONASE ALLERGY RELIEF) 50 mcg/act nasal spray into each nostril as needed     hydrOXYzine HCL (ATARAX) 25 mg tablet Take 1 tablet (25 mg total) by mouth every 8 (eight) hours as needed for anxiety for up to 30 days    lansoprazole (PREVACID) 30 mg capsule Take 1 capsule (30 mg total) by mouth daily    Levonorgest-Eth [address]-Day (DAYSEE) 0 15-0 03 &0 01 MG TABS Take 1 tablet by mouth daily    Multiple Vitamins-Minerals (HAIR SKIN AND NAILS FORMULA PO) Take by mouth    ranitidine (ZANTAC) 150 mg tablet Take 150 mg by mouth daily     SODIUM FLUORIDE, DENTAL GEL, (DENTA 5000 PLUS) 1 1 % CREA Denta 5000 Plus 1 1 % cream    tiZANidine (ZANAFLEX) 2 mg tablet Take 2 mg by mouth daily at bedtime    triamcinolone (KENALOG) 0 1 % cream APPLY SMALL AMOUNT TWICE A DAY X 2 WEEKS TO THE B/L HANDS AND ELBOW THEN AS NEEDED FOR FLARES    imipramine (TOFRANIL) 50 mg tablet Take 50 mg by mouth daily at bedtime     No current facility-administered medications on file prior to visit  She is allergic to lexapro [escitalopram]; robaxin [methocarbamol]; and xanax [alprazolam]            Objective:    Blood pressure 90/57, pulse 89, height 5' 8 5" (1 74 m), weight 54 4 kg (120 lb), not currently breastfeeding  Physical Exam/Neurological Exam  CONSTITUTIONAL: NAD, pleasant  NECK: supple, no lymphadenopathy, no thyromegaly, no JVD  CARDIOVASCULAR: RRR, normal S1S2, no murmurs, no rubs  RESP: clear to auscultation bilaterally, no wheezes/rhonchi/rales  ABDOMEN: soft, non tender, non distended  SKIN: no rash or skin lesions  EXTREMITIES: no edema, pulses 2+bilaterally  PSYCH: appropriate mood and affect  NEUROLOGIC COMPREHENSIVE EXAM: Patient is oriented to person, place and time, NAD; appropriate affect  CN II, III, IV, V, VI, VII,VIII,IX,X,XI-XII intact with EOMI, PERRLA, OKN intact, VF grossly intact, fundi poorly visualized secondary to pupillary constriction; symmetric face noted  Motor: 5/5 UE/LE bilateral symmetric; Sensory: intact to light touch and pinprick bilaterally; normal vibration sensation feet bilaterally; Coordination within normal limits on FTN and EDISON testing; DTR: 2/4 through, no Babinski, no clonus  Tandem gait is intact  Romberg: negative  ROS:  12 points of review of system was reviewed with the patient and was unremarkable with exception: see HPI  Review of Systems   Constitutional: Negative  HENT: Negative  Eyes: Negative  Respiratory: Negative  Cardiovascular: Negative  Gastrointestinal: Negative  Endocrine: Negative  Genitourinary: Negative  Musculoskeletal: Positive for myalgias  Skin: Negative  Allergic/Immunologic: Negative  Neurological: Negative           Tingling, shock feeling thru out whole body Hematological: Negative  Psychiatric/Behavioral: Negative

## 2019-08-14 ENCOUNTER — TELEPHONE (OUTPATIENT)
Dept: NEUROLOGY | Facility: CLINIC | Age: 24
End: 2019-08-14

## 2019-08-14 NOTE — TELEPHONE ENCOUNTER
Pt called in for her labs that were done at [facility] 2 days ago, called [facility] the Aldolase is still pending but she will send over what is resulted  Pt would like a call back  Will wait labs

## 2019-08-15 NOTE — TELEPHONE ENCOUNTER
Please watch for results for Dr Tammy Terry to review and contact the lab for report if necessary Thank you!

## 2019-08-15 NOTE — TELEPHONE ENCOUNTER
Blood work completed and normal for CK/aldolase, negative for Sjogren's antibodies;   Patient is to start B12 supplements as her level is at low normal

## 2019-08-16 ENCOUNTER — OFFICE VISIT (OUTPATIENT)
Dept: URGENT CARE | Facility: CLINIC | Age: 24
End: 2019-08-16
Payer: COMMERCIAL

## 2019-08-16 VITALS
HEART RATE: 81 BPM | RESPIRATION RATE: 16 BRPM | WEIGHT: 124.6 LBS | OXYGEN SATURATION: 99 % | SYSTOLIC BLOOD PRESSURE: 105 MMHG | DIASTOLIC BLOOD PRESSURE: 66 MMHG | BODY MASS INDEX: 18.46 KG/M2 | HEIGHT: 69 IN | TEMPERATURE: 98.9 F

## 2019-08-16 DIAGNOSIS — M79.89 SWELLING IN RIGHT ARMPIT: Primary | ICD-10-CM

## 2019-08-16 PROCEDURE — 99213 OFFICE O/P EST LOW 20 MIN: CPT | Performed by: PHYSICIAN ASSISTANT

## 2019-08-16 PROCEDURE — S9088 SERVICES PROVIDED IN URGENT: HCPCS | Performed by: PHYSICIAN ASSISTANT

## 2019-08-16 NOTE — PROGRESS NOTES
[facility]        NAME: Chel Saunders is a 21 y o  female  : 1995    MRN: [id number]  DATE: 2019  TIME: 3:28 PM    Assessment and Plan   Swelling in right armpit [M79 89]  1  Swelling in right armpit         Patient Instructions     Keep area clean and dry  Monitor area for worsening  Follow up with PCP in 3-5 days  Proceed to  ER if symptoms worsen  Chief Complaint     Chief Complaint   Patient presents with    Skin Problem     Pt noted swelling under her right armpit monday  Pain reported 2/10  Pt denies drainage  History of Present Illness       Patient presents with complaint of swelling in right underarm since Monday  Pt states that she got lots of insect bites on her shoulders and upper arms on Monday and is unsure if that's what is in her armpit or if it's a swollen lymph node  Pt denies pruritus, fever, chills, night sweats, pain, chest tightness, and dyspnea  Pt denies recent antibiotic use  Pt reports shaving her armpits a few times a week  Pt states that the area was red before but states that it seems to be getting better  Review of Systems   Review of Systems   Constitutional: Negative for chills, fatigue and fever  Respiratory: Negative for cough, chest tightness, shortness of breath and wheezing  Cardiovascular: Negative for chest pain and palpitations  Musculoskeletal: Negative for myalgias, neck pain and neck stiffness  Skin: Negative for color change, rash and wound  Neurological: Negative for dizziness, weakness and headaches  All other systems reviewed and are negative          Current Medications       Current Outpatient Medications:     ALPRAZolam (XANAX) 0 25 mg tablet, Take 0 25 mg by mouth 3 (three) times a day, Disp: , Rfl: 0    ALPRAZolam (XANAX) 0 5 mg tablet, Take 0 5 mg by mouth daily at bedtime, Disp: , Rfl: 0    cetirizine (ZyrTEC) 10 mg tablet, Take 10 mg by mouth daily, Disp: , Rfl:     Cholecalciferol (VITAMIN D-3) 5000 units TABS, Take 1 tablet daily, Disp: 1 tablet, Rfl: 0    diazepam (VALIUM) 5 mg tablet, Take 1 tab 40 min prior to imaging with a second 10 min prior to MRI, Disp: 2 tablet, Rfl: 0    dicyclomine (BENTYL) 10 mg capsule, Take 1 capsule (10 mg total) by mouth every 8 (eight) hours as needed (Abdominal pain), Disp: 20 capsule, Rfl: 0    fluticasone (FLONASE ALLERGY RELIEF) 50 mcg/act nasal spray, into each nostril as needed , Disp: , Rfl:     imipramine (TOFRANIL) 50 mg tablet, Take 50 mg by mouth daily at bedtime, Disp: , Rfl: 0    lansoprazole (PREVACID) 30 mg capsule, Take 1 capsule (30 mg total) by mouth daily, Disp: 30 capsule, Rfl: 2    Levonorgest-Eth [address]-Day (DAYSEE) 0 15-0 03 &0 01 MG TABS, Take 1 tablet by mouth daily, Disp: 84 each, Rfl: 3    Multiple Vitamins-Minerals (HAIR SKIN AND NAILS FORMULA PO), Take by mouth, Disp: , Rfl:     ranitidine (ZANTAC) 150 mg tablet, Take 150 mg by mouth daily , Disp: , Rfl:     SODIUM FLUORIDE, DENTAL GEL, (DENTA 5000 PLUS) 1 1 % CREA, Denta 5000 Plus 1 1 % cream, Disp: , Rfl:     tiZANidine (ZANAFLEX) 2 mg tablet, Take 2 mg by mouth daily at bedtime, Disp: , Rfl: 2    triamcinolone (KENALOG) 0 1 % cream, APPLY SMALL AMOUNT TWICE A DAY X 2 WEEKS TO THE B/L HANDS AND ELBOW THEN AS NEEDED FOR FLARES, Disp: , Rfl: 0    hydrOXYzine HCL (ATARAX) 25 mg tablet, Take 1 tablet (25 mg total) by mouth every 8 (eight) hours as needed for anxiety for up to 30 days, Disp: 30 tablet, Rfl: 0    Current Allergies     Allergies as of 08/16/2019 - Reviewed 08/16/2019   Allergen Reaction Noted    Lexapro [escitalopram]  10/02/2018    Robaxin [methocarbamol] GI Intolerance 04/09/2019    Xanax [alprazolam]  05/22/2019            The following portions of the patient's history were reviewed and updated as appropriate: allergies, current medications, past family history, past medical history, past social history, past surgical history and problem list      Past Medical History:   Diagnosis Date    Allergic     Anxiety     Eczema     Fracture of pelvis ([address] )     Last Assessed:  3/1/17    Jaw fracture (HCC)     Last Assessed:  3/1/17       Past Surgical History:   Procedure Laterality Date    SKIN BIOPSY      WISDOM TOOTH EXTRACTION         Family History   Problem Relation Age of Onset    Coronary artery disease Mother     Hyperlipidemia Mother     Hypothyroidism Mother     Diabetes Maternal Grandfather     Hernia Father     No Known Problems Maternal Grandmother     No Known Problems Paternal Grandmother     No Known Problems Maternal Aunt     No Known Problems Paternal Aunt          Medications have been verified  Objective   /66 (BP Location: Right arm, Patient Position: Sitting)   Pulse 81   Temp 98 9 °F (37 2 °C) (Tympanic)   Resp 16   Ht 5' 8 5" (1 74 m)   Wt 56 5 kg (124 lb 9 6 oz)   SpO2 99%   BMI 18 67 kg/m²        Physical Exam     Physical Exam   Constitutional: She is oriented to person, place, and time  She appears well-developed and well-nourished  No distress  HENT:   Head: Normocephalic and atraumatic  Right Ear: External ear normal    Left Ear: External ear normal    Nose: Nose normal    Mouth/Throat: Oropharynx is clear and moist    Eyes: Pupils are equal, round, and reactive to light  Conjunctivae and EOM are normal  Right eye exhibits no discharge  Left eye exhibits no discharge  Neck: Normal range of motion  Neck supple  No thyromegaly present  Cardiovascular: Normal rate, regular rhythm and normal heart sounds  Pulmonary/Chest: Effort normal and breath sounds normal  No respiratory distress  She has no wheezes  Lymphadenopathy:     She has no cervical adenopathy  Neurological: She is alert and oriented to person, place, and time  No cranial nerve deficit or sensory deficit  Skin: Skin is warm and dry  Capillary refill takes less than 2 seconds  No rash noted  She is not diaphoretic     Right axilla: scattered short dark hair; approx 0 25 cm mobile, soft nodule without erythema; no swelling; NTTP   Psychiatric: She has a normal mood and affect  Her behavior is normal  Thought content normal    Nursing note and vitals reviewed

## 2019-08-20 ENCOUNTER — HOSPITAL ENCOUNTER (OUTPATIENT)
Dept: NEUROLOGY | Facility: HOSPITAL | Age: 24
Discharge: HOME | End: 2019-08-20
Attending: PSYCHIATRY & NEUROLOGY
Payer: COMMERCIAL

## 2019-08-20 DIAGNOSIS — R25.2 MUSCLE CRAMPS: ICD-10-CM

## 2019-08-20 DIAGNOSIS — R20.2 PARESTHESIA: ICD-10-CM

## 2019-08-20 PROCEDURE — 95887 MUSC TST DONE W/N TST NONEXT: CPT | Performed by: PSYCHIATRY & NEUROLOGY

## 2019-08-20 PROCEDURE — 95912 NRV CNDJ TEST 11-12 STUDIES: CPT | Performed by: PSYCHIATRY & NEUROLOGY

## 2019-08-20 PROCEDURE — 95886 MUSC TEST DONE W/N TEST COMP: CPT | Performed by: PSYCHIATRY & NEUROLOGY

## 2019-08-20 NOTE — PROCEDURES
[facility]  Electromyography and Nerve Conduction Study      Patient Name:  Zoë Bolton  MRN: [id number]   :  1995 Date performed: 2019    Age: 21 y o  Consult request by: Nemo Vieira, *      HISTORY:  Zoë Bolton is a 21 y o  female who complains of several months of generalized muscle twitching and tingling  The symptoms are located throughout her body, including her head and face  They are bilaterally symmetric  When questioned, she admits to chronic low back pain that does not radiate anywhere  She admits to neck pain for the last couple of months that also does not radiate anywhere  She is referred to rule out a generalized polyneuropathy versus myopathy      Past Medical History:   Diagnosis Date    Allergic     Anxiety     Eczema     Fracture of pelvis ([address] )     Last Assessed:  3/1/17    Jaw fracture (HCC)     Last Assessed:  3/1/17         Current Outpatient Medications:     ALPRAZolam (XANAX) 0 25 mg tablet, Take 0 25 mg by mouth 3 (three) times a day, Disp: , Rfl: 0    ALPRAZolam (XANAX) 0 5 mg tablet, Take 0 5 mg by mouth daily at bedtime, Disp: , Rfl: 0    cetirizine (ZyrTEC) 10 mg tablet, Take 10 mg by mouth daily, Disp: , Rfl:     Cholecalciferol (VITAMIN D-3) 5000 units TABS, Take 1 tablet daily, Disp: 1 tablet, Rfl: 0    diazepam (VALIUM) 5 mg tablet, Take 1 tab 40 min prior to imaging with a second 10 min prior to MRI, Disp: 2 tablet, Rfl: 0    dicyclomine (BENTYL) 10 mg capsule, Take 1 capsule (10 mg total) by mouth every 8 (eight) hours as needed (Abdominal pain), Disp: 20 capsule, Rfl: 0    fluticasone (FLONASE ALLERGY RELIEF) 50 mcg/act nasal spray, into each nostril as needed , Disp: , Rfl:     hydrOXYzine HCL (ATARAX) 25 mg tablet, Take 1 tablet (25 mg total) by mouth every 8 (eight) hours as needed for anxiety for up to 30 days, Disp: 30 tablet, Rfl: 0    imipramine (TOFRANIL) 50 mg tablet, Take 50 mg by mouth daily at bedtime, Disp: , Rfl: 0    lansoprazole (PREVACID) 30 mg capsule, Take 1 capsule (30 mg total) by mouth daily, Disp: 30 capsule, Rfl: 2    Levonorgest-Eth [address]-Day (DAYSEE) 0 15-0 03 &0 01 MG TABS, Take 1 tablet by mouth daily, Disp: 84 each, Rfl: 3    Multiple Vitamins-Minerals (HAIR SKIN AND NAILS FORMULA PO), Take by mouth, Disp: , Rfl:     ranitidine (ZANTAC) 150 mg tablet, Take 150 mg by mouth daily , Disp: , Rfl:     SODIUM FLUORIDE, DENTAL GEL, (DENTA 5000 PLUS) 1 1 % CREA, Denta 5000 Plus 1 1 % cream, Disp: , Rfl:     tiZANidine (ZANAFLEX) 2 mg tablet, Take 2 mg by mouth daily at bedtime, Disp: , Rfl: 2    triamcinolone (KENALOG) 0 1 % cream, APPLY SMALL AMOUNT TWICE A DAY X 2 WEEKS TO THE B/L HANDS AND ELBOW THEN AS NEEDED FOR FLARES, Disp: , Rfl: 0    PHYSICAL EXAMINATION:  In general, she appears well  Both upper and lower extremity muscle power was grade 5 bilaterally, including shoulder abduction and shoulder forward flexion  Vibration to maximal strike was 9 seconds at the right great toe and 12 seconds at the left great toe  Pinprick sensation was entirely intact, i e  there was no decreased pinprick sensation in a stocking distribution  DTRs were grade 2 and bilaterally symmetric, except for knee jerks, which were grade 3 and bilaterally symmetric  Plantars were flexor bilaterally  RESULTS:  EMG/NCS data and waveforms that were performed for this study have been scanned into Epic  Please refer to scanned document for waveforms  IMPRESSION:   This is a normal study  There is no electrophysiologic evidence of a generalized large fiber polyneuropathy  There is no electrophysiologic evidence of a generalized myopathy  There is no electrophysiologic evidence of a median neuropathy at the left wrist, despite detailed testing  There is no electrophysiologic evidence of a cervical or lumbosacral radiculopathy on the left      Therefore, there is no electrophysiologic evidence of pathology involving the peripheral nervous system to explain the patient's symptoms  Note that this study does not exclude a small fiber neuropathy, for which routine electrodiagnostic testing is insensitive  Clinical correlation is advised      Charles Hanna MD

## 2019-08-28 ENCOUNTER — OFFICE VISIT (OUTPATIENT)
Dept: FAMILY MEDICINE CLINIC | Facility: CLINIC | Age: 24
End: 2019-08-28
Payer: COMMERCIAL

## 2019-08-28 VITALS
TEMPERATURE: 97.5 F | RESPIRATION RATE: 18 BRPM | BODY MASS INDEX: 18.51 KG/M2 | SYSTOLIC BLOOD PRESSURE: 116 MMHG | WEIGHT: 125 LBS | HEIGHT: 69 IN | DIASTOLIC BLOOD PRESSURE: 78 MMHG

## 2019-08-28 DIAGNOSIS — F41.1 GENERALIZED ANXIETY DISORDER: Primary | ICD-10-CM

## 2019-08-28 DIAGNOSIS — J30.9 ALLERGIC RHINITIS, UNSPECIFIED SEASONALITY, UNSPECIFIED TRIGGER: ICD-10-CM

## 2019-08-28 DIAGNOSIS — R25.3 MUSCLE TWITCHING: ICD-10-CM

## 2019-08-28 DIAGNOSIS — F51.04 PSYCHOPHYSIOLOGICAL INSOMNIA: ICD-10-CM

## 2019-08-28 PROBLEM — G47.00 INSOMNIA: Status: ACTIVE | Noted: 2019-08-28

## 2019-08-28 PROCEDURE — 1036F TOBACCO NON-USER: CPT | Performed by: FAMILY MEDICINE

## 2019-08-28 PROCEDURE — 3008F BODY MASS INDEX DOCD: CPT | Performed by: FAMILY MEDICINE

## 2019-08-28 PROCEDURE — 99214 OFFICE O/P EST MOD 30 MIN: CPT | Performed by: FAMILY MEDICINE

## 2019-08-28 RX ORDER — DULOXETIN HYDROCHLORIDE 60 MG/1
60 CAPSULE, DELAYED RELEASE ORAL DAILY
COMMUNITY
End: 2020-04-21 | Stop reason: SDUPTHER

## 2019-08-28 RX ORDER — LANOLIN ALCOHOL/MO/W.PET/CERES
5000 CREAM (GRAM) TOPICAL DAILY
COMMUNITY

## 2019-08-28 RX ORDER — IPRATROPIUM BROMIDE 21 UG/1
2 SPRAY, METERED NASAL EVERY 12 HOURS
Qty: 30 ML | Refills: 0 | Status: SHIPPED | OUTPATIENT
Start: 2019-08-28 | End: 2019-10-14

## 2019-08-28 RX ORDER — FEXOFENADINE HYDROCHLORIDE 60 MG/1
60 TABLET, FILM COATED ORAL DAILY
COMMUNITY
End: 2020-02-20 | Stop reason: ALTCHOICE

## 2019-08-28 NOTE — LETTER
August 28, 2019     [facility], [address]  [place]    Patient: Della June   YOB: 1995   Date of Visit: 8/28/2019       Dear Dr Sunita Yarbrough: Thank you for referring Trista Franco to me for evaluation  Below are my notes for this consultation  If you have questions, please do not hesitate to call me  I look forward to following your patient along with you  Sincerely,        Jens Goldstein MD        CC: No Recipients  Jens Goldstein MD  8/28/2019  7:00 PM  Sign at close encounter  Assessment and Plan:    Problem List Items Addressed This Visit     Allergic rhinitis     Patient was having some problems with allergies  She was on Zyrtec  When that ran out, she just recently switched to Guerraview  Most of his symptoms before that bothered her were runny nose, the Zyrtec did not seem to change that much before  She has been on the Allegra for approximately 2 days now  Recommend that she re-evaluate on Friday  If she continues to have symptoms more so than when she was on the Zyrtec, switch back to Zyrtec  If it is helping more, continue with the Allegra  As far as runny nose, consider adding Atrovent  Relevant Medications    ipratropium (ATROVENT) 0 03 % nasal spray    Generalized anxiety disorder - Primary     Patient is following with Psychiatry  Recommend that she continue to speak with them  Recommendations made for anxiety today would be adding trazodone at HS, as that may also help with the sleep  She did have problem with trazodone before, more specifically she was getting more tired with it  This may be exactly the benefit of the medication for her  Again, follow up with Psychiatry  Relevant Medications    DULoxetine (CYMBALTA) 20 mg capsule    Insomnia     At this point, the patient's problem seems to be staying asleep, not getting to sleep    With this, I would recommend that she reconsider using the imipramine, but I would use that with the Cymbalta  Another option would be trazodone at HS, Cymbalta in the a m Sherre Soulier Third option would be with Elavil  Patient could consider using Ambien CR, or Belsomra, as all of these should help with maintaining sleep  I will defer to Psychiatry for recommendations, but again I would not have a problem with her using Cymbalta along with a tricyclic antidepressant  This may have the benefit of 2 medications working on anxiety and depression symptoms  Muscle twitching     Patient is noticing a bit more of a muscle twitches  Again, this may be related to anxiety verses poor sleep  Follow with Psychiatry for now  Diagnoses and all orders for this visit:    Generalized anxiety disorder    Psychophysiological insomnia    Muscle twitching    Allergic rhinitis, unspecified seasonality, unspecified trigger  -     ipratropium (ATROVENT) 0 03 % nasal spray; 2 sprays into each nostril every 12 (twelve) hours for 30 days    Other orders  -     fexofenadine (ALLEGRA) 60 MG tablet; Take 60 mg by mouth daily  -     vitamin B-12 (VITAMIN B-12) 1,000 mcg tablet; Take 5,000 mcg by mouth daily  -     DULoxetine (CYMBALTA) 20 mg capsule; Take 20 mg by mouth daily  -     Melatonin 1 MG CAPS; Take 5 mg by mouth              Subjective:      Patient ID: Alessandra Palomo is a 21 y o  female  CC:    Chief Complaint   Patient presents with    Insomnia     Patient present today for sleeping problems for the last month, Pt wakes up  every night at the same time  Patient will also like to discuss with Dr Nancy Camarena about her seasonal allergies  HPI:    Within last month, she is awakening about 4-5 hours after going to sleep  Has been for the last month  Was after the Xanax from Psych  The psych increased the dose  It did not really help  Muscle relaxers with Xanax did not help  Melatonin did not help    Changes from Psych with recommendation to change from Cymbalta to Imipramine  On further discussion, the patient did try trazodone before  It was for a few days, and she was a little bit weird out initially as this was being used for anxiety treatment, and she was noticing that she was more sleepy on it  Reviewed Elavil, trazodone, imipramine  Reviewed their use with SNRI's  Patient reports she has seen neurology recently for tremors  They did an EMG  That was negative  Laboratory study showed that her B12 was slightly low  Once she started using B12 supplementation, some of the symptoms she was having resolved  The following portions of the patient's history were reviewed and updated as appropriate: allergies, current medications, past family history, past medical history, past social history, past surgical history and problem list       Review of Systems   Constitutional: Negative  HENT: Negative  Eyes: Negative  Respiratory: Negative  Cardiovascular: Negative  Gastrointestinal: Negative  Endocrine: Negative  Genitourinary: Negative  Musculoskeletal: Negative  Skin: Negative  Allergic/Immunologic: Negative  Neurological: Positive for tremors and numbness  Hematological: Negative  Psychiatric/Behavioral: The patient is nervous/anxious  Data to review:       Objective:    Vitals:    08/28/19 1824   BP: 116/78   BP Location: Left arm   Patient Position: Sitting   Cuff Size: Standard   Resp: 18   Temp: 97 5 °F (36 4 °C)   TempSrc: Temporal   Weight: 56 7 kg (125 lb)   Height: 5' 8 5" (1 74 m)        Physical Exam   Constitutional: She appears well-developed and well-nourished  Psychiatric: She has a normal mood and affect  Her behavior is normal  Judgment and thought content normal    Nursing note and vitals reviewed

## 2019-08-28 NOTE — ASSESSMENT & PLAN NOTE
Patient is noticing a bit more of a muscle twitches  Again, this may be related to anxiety verses poor sleep  Follow with Psychiatry for now

## 2019-08-28 NOTE — ASSESSMENT & PLAN NOTE
At this point, the patient's problem seems to be staying asleep, not getting to sleep  With this, I would recommend that she reconsider using the imipramine, but I would use that with the Cymbalta  Another option would be trazodone at , Cymbalta in the a m  [facility] Third option would be with Elavil  Patient could consider using Ambien CR, or Belsomra, as all of these should help with maintaining sleep  I will defer to Psychiatry for recommendations, but again I would not have a problem with her using Cymbalta along with a tricyclic antidepressant  This may have the benefit of 2 medications working on anxiety and depression symptoms

## 2019-08-28 NOTE — ASSESSMENT & PLAN NOTE
Patient was having some problems with allergies  She was on Zyrtec  When that ran out, she just recently switched to Guerraview  Most of his symptoms before that bothered her were runny nose, the Zyrtec did not seem to change that much before  She has been on the Allegra for approximately 2 days now  Recommend that she re-evaluate on Friday  If she continues to have symptoms more so than when she was on the Zyrtec, switch back to Zyrtec  If it is helping more, continue with the Allegra  As far as runny nose, consider adding Atrovent

## 2019-08-28 NOTE — PATIENT INSTRUCTIONS
Problem List Items Addressed This Visit     Allergic rhinitis     Patient was having some problems with allergies  She was on Zyrtec  When that ran out, she just recently switched to Guerraview  Most of his symptoms before that bothered her were runny nose, the Zyrtec did not seem to change that much before  She has been on the Allegra for approximately 2 days now  Recommend that she re-evaluate on Friday  If she continues to have symptoms more so than when she was on the Zyrtec, switch back to Zyrtec  If it is helping more, continue with the Allegra  As far as runny nose, consider adding Atrovent  Relevant Medications    ipratropium (ATROVENT) 0 03 % nasal spray    Generalized anxiety disorder - Primary     Patient is following with Psychiatry  Recommend that she continue to speak with them  Recommendations made for anxiety today would be adding trazodone at HS, as that may also help with the sleep  She did have problem with trazodone before, more specifically she was getting more tired with it  This may be exactly the benefit of the medication for her  Again, follow up with Psychiatry  Relevant Medications    DULoxetine (CYMBALTA) 20 mg capsule    Insomnia     At this point, the patient's problem seems to be staying asleep, not getting to sleep  With this, I would recommend that she reconsider using the imipramine, but I would use that with the Cymbalta  Another option would be trazodone at HS, Cymbalta in the a m  Tamie Galley Third option would be with Elavil  Patient could consider using Ambien CR, or Belsomra, as all of these should help with maintaining sleep  I will defer to Psychiatry for recommendations, but again I would not have a problem with her using Cymbalta along with a tricyclic antidepressant  This may have the benefit of 2 medications working on anxiety and depression symptoms  Muscle twitching     Patient is noticing a bit more of a muscle twitches    Again, this may be related to anxiety verses poor sleep  Follow with Psychiatry for now

## 2019-08-28 NOTE — ASSESSMENT & PLAN NOTE
Patient is following with Psychiatry  Recommend that she continue to speak with them  Recommendations made for anxiety today would be adding trazodone at HS, as that may also help with the sleep  She did have problem with trazodone before, more specifically she was getting more tired with it  This may be exactly the benefit of the medication for her  Again, follow up with Psychiatry

## 2019-08-28 NOTE — PROGRESS NOTES
Assessment and Plan:    Problem List Items Addressed This Visit     Allergic rhinitis     Patient was having some problems with allergies  She was on Zyrtec  When that ran out, she just recently switched to Guerraview  Most of his symptoms before that bothered her were runny nose, the Zyrtec did not seem to change that much before  She has been on the Allegra for approximately 2 days now  Recommend that she re-evaluate on Friday  If she continues to have symptoms more so than when she was on the Zyrtec, switch back to Zyrtec  If it is helping more, continue with the Allegra  As far as runny nose, consider adding Atrovent  Relevant Medications    ipratropium (ATROVENT) 0 03 % nasal spray    Generalized anxiety disorder - Primary     Patient is following with Psychiatry  Recommend that she continue to speak with them  Recommendations made for anxiety today would be adding trazodone at HS, as that may also help with the sleep  She did have problem with trazodone before, more specifically she was getting more tired with it  This may be exactly the benefit of the medication for her  Again, follow up with Psychiatry  Relevant Medications    DULoxetine (CYMBALTA) 20 mg capsule    Insomnia     At this point, the patient's problem seems to be staying asleep, not getting to sleep  With this, I would recommend that she reconsider using the imipramine, but I would use that with the Cymbalta  Another option would be trazodone at HS, Cymbalta in the a m  Juan Balint Third option would be with Elavil  Patient could consider using Ambien CR, or Belsomra, as all of these should help with maintaining sleep  I will defer to Psychiatry for recommendations, but again I would not have a problem with her using Cymbalta along with a tricyclic antidepressant  This may have the benefit of 2 medications working on anxiety and depression symptoms           Muscle twitching     Patient is noticing a bit more of a muscle twitches  Again, this may be related to anxiety verses poor sleep  Follow with Psychiatry for now  Diagnoses and all orders for this visit:    Generalized anxiety disorder    Psychophysiological insomnia    Muscle twitching    Allergic rhinitis, unspecified seasonality, unspecified trigger  -     ipratropium (ATROVENT) 0 03 % nasal spray; 2 sprays into each nostril every 12 (twelve) hours for 30 days    Other orders  -     fexofenadine (ALLEGRA) 60 MG tablet; Take 60 mg by mouth daily  -     vitamin B-12 (VITAMIN B-12) 1,000 mcg tablet; Take 5,000 mcg by mouth daily  -     DULoxetine (CYMBALTA) 20 mg capsule; Take 20 mg by mouth daily  -     Melatonin 1 MG CAPS; Take 5 mg by mouth              Subjective:      Patient ID: Duy Hopkins is a 21 y o  female  CC:    Chief Complaint   Patient presents with    Insomnia     Patient present today for sleeping problems for the last month, Pt wakes up  every night at the same time  Patient will also like to discuss with Dr Marty Silvestre about her seasonal allergies  HPI:    Within last month, she is awakening about 4-5 hours after going to sleep  Has been for the last month  Was after the Xanax from Psych  The psych increased the dose  It did not really help  Muscle relaxers with Xanax did not help  Melatonin did not help  Changes from Psych with recommendation to change from Cymbalta to Imipramine  On further discussion, the patient did try trazodone before  It was for a few days, and she was a little bit weird out initially as this was being used for anxiety treatment, and she was noticing that she was more sleepy on it  Reviewed Elavil, trazodone, imipramine  Reviewed their use with SNRI's  Patient reports she has seen neurology recently for tremors  They did an EMG  That was negative  Laboratory study showed that her B12 was slightly low    Once she started using B12 supplementation, some of the symptoms she was having resolved  The following portions of the patient's history were reviewed and updated as appropriate: allergies, current medications, past family history, past medical history, past social history, past surgical history and problem list       Review of Systems   Constitutional: Negative  HENT: Negative  Eyes: Negative  Respiratory: Negative  Cardiovascular: Negative  Gastrointestinal: Negative  Endocrine: Negative  Genitourinary: Negative  Musculoskeletal: Negative  Skin: Negative  Allergic/Immunologic: Negative  Neurological: Positive for tremors and numbness  Hematological: Negative  Psychiatric/Behavioral: The patient is nervous/anxious  Data to review:       Objective:    Vitals:    08/28/19 1824   BP: 116/78   BP Location: Left arm   Patient Position: Sitting   Cuff Size: Standard   Resp: 18   Temp: 97 5 °F (36 4 °C)   TempSrc: Temporal   Weight: 56 7 kg (125 lb)   Height: 5' 8 5" (1 74 m)        Physical Exam   Constitutional: She appears well-developed and well-nourished  Psychiatric: She has a normal mood and affect  Her behavior is normal  Judgment and thought content normal    Nursing note and vitals reviewed

## 2019-09-06 ENCOUNTER — HOSPITAL ENCOUNTER (OUTPATIENT)
Dept: MRI IMAGING | Facility: HOSPITAL | Age: 24
Discharge: HOME | End: 2019-09-06
Attending: PSYCHIATRY & NEUROLOGY
Payer: COMMERCIAL

## 2019-09-06 ENCOUNTER — HOSPITAL ENCOUNTER (OUTPATIENT)
Dept: MRI IMAGING | Facility: HOSPITAL | Age: 24
Discharge: HOME | End: 2019-09-06
Attending: PSYCHIATRY & NEUROLOGY

## 2019-09-06 DIAGNOSIS — R25.2 MUSCLE CRAMPS: ICD-10-CM

## 2019-09-06 DIAGNOSIS — R20.2 PARESTHESIA: ICD-10-CM

## 2019-09-06 PROCEDURE — A9585 GADOBUTROL INJECTION: HCPCS | Performed by: PSYCHIATRY & NEUROLOGY

## 2019-09-06 PROCEDURE — 72156 MRI NECK SPINE W/O & W/DYE: CPT

## 2019-09-06 PROCEDURE — 70553 MRI BRAIN STEM W/O & W/DYE: CPT

## 2019-09-06 RX ADMIN — GADOBUTROL 5 ML: 604.72 INJECTION INTRAVENOUS at 21:09

## 2019-09-30 ENCOUNTER — OFFICE VISIT (OUTPATIENT)
Dept: URGENT CARE | Facility: CLINIC | Age: 24
End: 2019-09-30
Payer: COMMERCIAL

## 2019-09-30 VITALS
BODY MASS INDEX: 18.66 KG/M2 | HEART RATE: 73 BPM | OXYGEN SATURATION: 99 % | DIASTOLIC BLOOD PRESSURE: 74 MMHG | HEIGHT: 69 IN | WEIGHT: 126 LBS | RESPIRATION RATE: 16 BRPM | TEMPERATURE: 98.6 F | SYSTOLIC BLOOD PRESSURE: 110 MMHG

## 2019-09-30 DIAGNOSIS — K12.0 APHTHOUS ULCER: Primary | ICD-10-CM

## 2019-09-30 PROCEDURE — S9088 SERVICES PROVIDED IN URGENT: HCPCS | Performed by: PHYSICIAN ASSISTANT

## 2019-09-30 PROCEDURE — 99213 OFFICE O/P EST LOW 20 MIN: CPT | Performed by: PHYSICIAN ASSISTANT

## 2019-09-30 NOTE — PROGRESS NOTES
[facility]        NAME: Gibson Melissa is a 21 y o  female  : 1995    MRN: [id number]  DATE: 2019  TIME: 6:24 PM    Assessment and Plan   Aphthous ulcer [K12 0]  1  Aphthous ulcer  al mag oxide-diphenhydramine-lidocaine viscous (MAGIC MOUTHWASH) 1:1:1 suspension         Patient Instructions     Use mouthwash as directed  C/w OTC allergy medications  Follow up with PCP in 3-5 days  Proceed to  ER if symptoms worsen  Chief Complaint     Chief Complaint   Patient presents with    Sore Throat     Pt reports for one week she has c/o off/on sore throat #1/10, runny nose, and recent canker sores  Pt is taking Advil prn  History of Present Illness       Patient presents with complaint of sore throat x several days  Pt states that the pain is only on the left side of her throat and describes it as constant and sore  Pt denies fever, chills, night sweats, cough, sinus pressure, ear pain, and abdominal symptoms  Pt reports rhinorrhea which she attributes to her allergies  Pt reports taking zyrtec daily  Pt reports trying an OTC treatment for the ulcer but states that it doesn't relieve her pain for long  Review of Systems   Review of Systems   Constitutional: Negative for chills, fatigue and fever  HENT: Positive for rhinorrhea and sore throat  Negative for congestion  Respiratory: Negative for cough, chest tightness, shortness of breath and wheezing  Cardiovascular: Negative for chest pain and palpitations  Musculoskeletal: Negative for myalgias, neck pain and neck stiffness  Skin: Negative for color change, rash and wound  Neurological: Negative for headaches  All other systems reviewed and are negative          Current Medications       Current Outpatient Medications:     ALPRAZolam (XANAX) 0 25 mg tablet, Take 0 25 mg by mouth 3 (three) times a day, Disp: , Rfl: 0    ALPRAZolam (XANAX) 0 5 mg tablet, Take 0 5 mg by mouth daily at bedtime, Disp: , Rfl: 0    cetirizine (ZyrTEC) 10 mg tablet, Take 10 mg by mouth daily, Disp: , Rfl:     Cholecalciferol (VITAMIN D-3) 5000 units TABS, Take 1 tablet daily, Disp: 1 tablet, Rfl: 0    dicyclomine (BENTYL) 10 mg capsule, Take 1 capsule (10 mg total) by mouth every 8 (eight) hours as needed (Abdominal pain), Disp: 20 capsule, Rfl: 0    DULoxetine (CYMBALTA) 20 mg capsule, Take 20 mg by mouth daily, Disp: , Rfl:     fexofenadine (ALLEGRA) 60 MG tablet, Take 60 mg by mouth daily, Disp: , Rfl:     fluticasone (FLONASE ALLERGY RELIEF) 50 mcg/act nasal spray, into each nostril as needed , Disp: , Rfl:     lansoprazole (PREVACID) 30 mg capsule, Take 1 capsule (30 mg total) by mouth daily, Disp: 30 capsule, Rfl: 2    Levonorgest-Eth Estrad 91-Day (DAYSEE) 0 15-0 03 &0 01 MG TABS, Take 1 tablet by mouth daily, Disp: 84 each, Rfl: 3    Multiple Vitamins-Minerals (HAIR SKIN AND NAILS FORMULA PO), Take by mouth, Disp: , Rfl:     ranitidine (ZANTAC) 150 mg tablet, Take 150 mg by mouth daily , Disp: , Rfl:     SODIUM FLUORIDE, DENTAL GEL, (DENTA 5000 PLUS) 1 1 % CREA, Denta 5000 Plus 1 1 % cream, Disp: , Rfl:     tiZANidine (ZANAFLEX) 2 mg tablet, Take 2 mg by mouth daily at bedtime, Disp: , Rfl: 2    triamcinolone (KENALOG) 0 1 % cream, APPLY SMALL AMOUNT TWICE A DAY X 2 WEEKS TO THE B/L HANDS AND ELBOW THEN AS NEEDED FOR FLARES, Disp: , Rfl: 0    vitamin B-12 (VITAMIN B-12) 1,000 mcg tablet, Take 5,000 mcg by mouth daily, Disp: , Rfl:     al mag oxide-diphenhydramine-lidocaine viscous (MAGIC MOUTHWASH) 1:1:1 suspension, Swish and spit 10 mL every 4 (four) hours as needed for mouth pain or discomfort, Disp: 1 Bottle, Rfl: 0    hydrOXYzine HCL (ATARAX) 25 mg tablet, Take 1 tablet (25 mg total) by mouth every 8 (eight) hours as needed for anxiety for up to 30 days, Disp: 30 tablet, Rfl: 0    imipramine (TOFRANIL) 50 mg tablet, Take 50 mg by mouth daily at bedtime, Disp: , Rfl: 0    ipratropium (ATROVENT) 0 03 % nasal spray, 2 sprays into each nostril every 12 (twelve) hours for 30 days, Disp: 30 mL, Rfl: 0    Melatonin 1 MG CAPS, Take 5 mg by mouth, Disp: , Rfl:     Current Allergies     Allergies as of 09/30/2019 - Reviewed 09/30/2019   Allergen Reaction Noted    Lexapro [escitalopram]  10/02/2018    Robaxin [methocarbamol] GI Intolerance 04/09/2019    Xanax [alprazolam]  05/22/2019            The following portions of the patient's history were reviewed and updated as appropriate: allergies, current medications, past family history, past medical history, past social history, past surgical history and problem list      Past Medical History:   Diagnosis Date    Allergic     Anxiety     Eczema     Fracture of pelvis ([address] )     Last Assessed:  3/1/17    Jaw fracture ([address] )     Last Assessed:  3/1/17       Past Surgical History:   Procedure Laterality Date    SKIN BIOPSY      WISDOM TOOTH EXTRACTION         Family History   Problem Relation Age of Onset    Coronary artery disease Mother     Hyperlipidemia Mother     Hypothyroidism Mother     Diabetes Maternal Grandfather     Hernia Father     No Known Problems Maternal Grandmother     No Known Problems Paternal Grandmother     No Known Problems Maternal Aunt     No Known Problems Paternal Aunt          Medications have been verified  Objective   /74 (BP Location: Left arm, Patient Position: Sitting)   Pulse 73   Temp 98 6 °F (37 °C) (Tympanic)   Resp 16   Ht 5' 9" (1 753 m)   Wt 57 2 kg (126 lb)   SpO2 99%   BMI 18 61 kg/m²        Physical Exam     Physical Exam   Constitutional: She is oriented to person, place, and time  She appears well-developed and well-nourished  Non-toxic appearance  She does not appear ill  No distress  HENT:   Head: Normocephalic and atraumatic  Right Ear: Tympanic membrane and ear canal normal  No swelling or tenderness  Left Ear: Tympanic membrane and ear canal normal  No swelling or tenderness  Mouth/Throat: Uvula is midline and mucous membranes are normal  Oral lesions (approx 0 5 cm shallow ulcer) present  Posterior oropharyngeal erythema present  No oropharyngeal exudate or posterior oropharyngeal edema  Tonsils are 0 on the right  Tonsils are 0 on the left  No tonsillar exudate  Eyes: Pupils are equal, round, and reactive to light  Conjunctivae and EOM are normal    Neck: Normal range of motion  Neck supple  Cardiovascular: Normal rate, regular rhythm and normal heart sounds  Pulmonary/Chest: Effort normal and breath sounds normal  No respiratory distress  She has no wheezes  She has no rhonchi  She has no rales  Lymphadenopathy:     She has no cervical adenopathy  Neurological: She is alert and oriented to person, place, and time  No cranial nerve deficit or sensory deficit  Skin: Skin is warm and dry  Capillary refill takes less than 2 seconds  No rash noted  She is not diaphoretic  Psychiatric: She has a normal mood and affect  Her behavior is normal  Thought content normal    Nursing note and vitals reviewed

## 2019-10-13 DIAGNOSIS — R00.2 PALPITATIONS: ICD-10-CM

## 2019-10-13 DIAGNOSIS — F41.1 GENERALIZED ANXIETY DISORDER: ICD-10-CM

## 2019-10-14 ENCOUNTER — OFFICE VISIT (OUTPATIENT)
Dept: NEUROLOGY | Facility: CLINIC | Age: 24
End: 2019-10-14
Payer: COMMERCIAL

## 2019-10-14 VITALS
WEIGHT: 123 LBS | BODY MASS INDEX: 18.22 KG/M2 | HEART RATE: 79 BPM | SYSTOLIC BLOOD PRESSURE: 108 MMHG | HEIGHT: 69 IN | DIASTOLIC BLOOD PRESSURE: 67 MMHG

## 2019-10-14 DIAGNOSIS — R42 DIZZINESS: Primary | ICD-10-CM

## 2019-10-14 DIAGNOSIS — R20.2 PARESTHESIAS: ICD-10-CM

## 2019-10-14 DIAGNOSIS — R25.3 MUSCLE TWITCHING: ICD-10-CM

## 2019-10-14 DIAGNOSIS — M79.10 MYALGIA: ICD-10-CM

## 2019-10-14 PROCEDURE — 99213 OFFICE O/P EST LOW 20 MIN: CPT | Performed by: PSYCHIATRY & NEUROLOGY

## 2019-10-14 RX ORDER — HYDROXYZINE HYDROCHLORIDE 25 MG/1
TABLET, FILM COATED ORAL
Qty: 30 TABLET | Refills: 0 | Status: SHIPPED | OUTPATIENT
Start: 2019-10-14 | End: 2019-11-21 | Stop reason: SDUPTHER

## 2019-10-14 RX ORDER — QUETIAPINE FUMARATE 50 MG/1
100 TABLET, FILM COATED ORAL
Refills: 0 | COMMUNITY
Start: 2019-09-11 | End: 2020-04-21 | Stop reason: SDUPTHER

## 2019-10-14 RX ORDER — DIPHENHYDRAMINE HYDROCHLORIDE AND LIDOCAINE HYDROCHLORIDE AND ALUMINUM HYDROXIDE AND MAGNESIUM HYDRO
KIT
Refills: 0 | COMMUNITY
Start: 2019-09-30 | End: 2020-02-20 | Stop reason: ALTCHOICE

## 2019-10-14 NOTE — PROGRESS NOTES
[facility] MULTIPLE SCLEROSIS CENTER  PATIENT:  Krishna Roberts  MRN:  [id number]  :  1995  DATE OF SERVICE:  10/14/2019    Assessment/Plan:     Problem List Items Addressed This Visit        Other    Myalgia    Dizziness - Primary    Paresthesias    Muscle twitching         Ms  Audrey has presented for follow up on sensory dysfunction, with no weakness or balance dysfunction has been reported  B12 1000 mcg SL were completed - patient feels improving in her sensory disturbances now; muscle cramps in a form of toes dystonia improves with stretching, may consider adding magnesium 400 mg as needed  MRI brain and cervical spine completed and normal  No signs of demyelination or neoplasm, no other intracranial pathology noted  EMG/NCS were completed and reviewed with no large fiber pathology appreciated, no signs of radiculopathy or muscle disease noted  Annual follow up advised  Subjective: sensory dysfunction hands, spasms in her toes    HPI History of Present Illness  Mrs Ziggy Melissa is a 20 yo female who was referred to [place] [address] for evaluation of full body nerve tingling  Patient has a history of chest pain, Vitamin D deficiency, KEVAN, eustachian tube dysfunction, myalgia, left ear pain, insomnia, chronic bilateral back pain with no sciatica, epigastric pain, breast pain, fibromyalgia on Cymbalta; low abdominal pain, right calf pain, neck pain, mid thoracic pain; Patient works with [employer] for postural deficits, she has poor scapular stability  Prior evaluation was consistent with diffuse muscle pain and fasciculation with paresthesia  Patient has been working with rheumatology and psychiatry to help with her symptoms and duloxetine migth be providing some benefits    Stiffness in her toes on and off r 1 min only and they are position   The following portions of the patient's history were reviewed and updated as appropriate:   She  has a past medical history of Allergic, Anxiety, Eczema, Fracture of pelvis ([facility] [address] ), and Jaw fracture ([facility] [address] )  She   Patient Active Problem List    Diagnosis Date Noted    Insomnia 08/28/2019    Paresthesias     Muscle twitching     Aphthous ulcer 07/09/2019    Nystagmus 07/01/2019    GERD (gastroesophageal reflux disease) 06/10/2019    Palpitations 05/22/2019    Calf pain 05/07/2019    Idiopathic hypotension 05/07/2019    Lower abdominal pain 04/09/2019    Bloating 04/09/2019    Dizziness 04/09/2019    Skin lump of leg, left 04/09/2019    Muscle cramps 04/06/2019    Encounter for surveillance of contraceptives 03/25/2019    Vitamin D deficiency 02/21/2019    Myalgia 02/21/2019    Other chest pain 11/28/2018    Abnormal EKG 11/28/2018    Chronic bilateral low back pain without sciatica 09/25/2018    Elevated LDL cholesterol level 09/25/2018    Fatigue 03/15/2018    Generalized anxiety disorder 03/01/2017    TMJ syndrome 03/01/2017    Allergic rhinitis 03/19/2015    Menstrual irregularity 07/29/2014    Acne 03/03/2014     She  has a past surgical history that includes [place] tooth extraction and Skin biopsy  Her family history includes Coronary artery disease in her mother; Diabetes in her maternal grandfather; Hernia in her father; Hyperlipidemia in her mother; Hypothyroidism in her mother; No Known Problems in her maternal aunt, maternal grandmother, paternal aunt, and paternal grandmother  She  reports that she has never smoked  She has never used smokeless tobacco  She reports that she drinks alcohol  She reports that she does not use drugs    Current Outpatient Medications   Medication Sig Dispense Refill    ALPRAZolam (XANAX) 0 5 mg tablet Take 0 5 mg by mouth daily at bedtime  0    Cholecalciferol (VITAMIN D-3) 5000 units TABS Take 1 tablet daily 1 tablet 0    dicyclomine (BENTYL) 10 mg capsule Take 1 capsule (10 mg total) by mouth every 8 (eight) hours as needed (Abdominal pain) 20 capsule 0    DPH-Lido-AlHydr-MgHydr-Simeth (FIRST-MOUTHWASH BLM) SUSP SWISH AND SPIT 10 ML EVERY 4 (FOUR) HOURS AS NEEDED FOR MOUTH PAIN OR DISCOMFORT  0    DULoxetine (CYMBALTA) 20 mg capsule Take 20 mg by mouth daily      fexofenadine (ALLEGRA) 60 MG tablet Take 60 mg by mouth daily      fluticasone (FLONASE ALLERGY RELIEF) 50 mcg/act nasal spray into each nostril as needed       lansoprazole (PREVACID) 30 mg capsule Take 1 capsule (30 mg total) by mouth daily 30 capsule 2    Levonorgest-Eth Estrad 91-Day (DAYSEE) 0 15-0 03 &0 01 MG TABS Take 1 tablet by mouth daily 84 each 3    Multiple Vitamins-Minerals (HAIR SKIN AND NAILS FORMULA PO) Take by mouth      QUEtiapine (SEROquel) 100 mg tablet TAKE 1/2 TABLET AT BEDTIME FOR 5 DAYS  THEN TAKE 1 TABLET AT BEDTIME DAILY  0    ranitidine (ZANTAC) 150 mg tablet Take 150 mg by mouth daily       SODIUM FLUORIDE, DENTAL GEL, (DENTA 5000 PLUS) 1 1 % CREA Denta 5000 Plus 1 1 % cream      tiZANidine (ZANAFLEX) 2 mg tablet Take 2 mg by mouth daily at bedtime  2    triamcinolone (KENALOG) 0 1 % cream APPLY SMALL AMOUNT TWICE A DAY X 2 WEEKS TO THE B/L HANDS AND ELBOW THEN AS NEEDED FOR FLARES  0    vitamin B-12 (VITAMIN B-12) 1,000 mcg tablet Take 5,000 mcg by mouth daily      hydrOXYzine HCL (ATARAX) 25 mg tablet TAKE 1 TABLET BY MOUTH EVERY 8 HOURS AS NEEDED FOR ANXIETY 30 tablet 0    imipramine (TOFRANIL) 50 mg tablet Take 50 mg by mouth daily at bedtime  0     No current facility-administered medications for this visit        Current Outpatient Medications on File Prior to Visit   Medication Sig    ALPRAZolam (XANAX) 0 5 mg tablet Take 0 5 mg by mouth daily at bedtime    Cholecalciferol (VITAMIN D-3) 5000 units TABS Take 1 tablet daily    dicyclomine (BENTYL) 10 mg capsule Take 1 capsule (10 mg total) by mouth every 8 (eight) hours as needed (Abdominal pain)    DPH-Lido-AlHydr-MgHydr-Simeth (FIRST-MOUTHWASH BLM) SUSP SWISH AND SPIT 10 ML EVERY 4 (FOUR) HOURS AS NEEDED FOR MOUTH PAIN OR DISCOMFORT    DULoxetine (CYMBALTA) 20 mg capsule Take 20 mg by mouth daily    fexofenadine (ALLEGRA) 60 MG tablet Take 60 mg by mouth daily    fluticasone (FLONASE ALLERGY RELIEF) 50 mcg/act nasal spray into each nostril as needed     lansoprazole (PREVACID) 30 mg capsule Take 1 capsule (30 mg total) by mouth daily    Levonorgest-Eth Estrad 91-Day (DAYSEE) 0 15-0 03 &0 01 MG TABS Take 1 tablet by mouth daily    Multiple Vitamins-Minerals (HAIR SKIN AND NAILS FORMULA PO) Take by mouth    QUEtiapine (SEROquel) 100 mg tablet TAKE 1/2 TABLET AT BEDTIME FOR 5 DAYS  THEN TAKE 1 TABLET AT BEDTIME DAILY    ranitidine (ZANTAC) 150 mg tablet Take 150 mg by mouth daily     SODIUM FLUORIDE, DENTAL GEL, (DENTA 5000 PLUS) 1 1 % CREA Denta 5000 Plus 1 1 % cream    tiZANidine (ZANAFLEX) 2 mg tablet Take 2 mg by mouth daily at bedtime    triamcinolone (KENALOG) 0 1 % cream APPLY SMALL AMOUNT TWICE A DAY X 2 WEEKS TO THE B/L HANDS AND ELBOW THEN AS NEEDED FOR FLARES    vitamin B-12 (VITAMIN B-12) 1,000 mcg tablet Take 5,000 mcg by mouth daily    hydrOXYzine HCL (ATARAX) 25 mg tablet TAKE 1 TABLET BY MOUTH EVERY 8 HOURS AS NEEDED FOR ANXIETY    imipramine (TOFRANIL) 50 mg tablet Take 50 mg by mouth daily at bedtime    [DISCONTINUED] al mag oxide-diphenhydramine-lidocaine viscous (MAGIC MOUTHWASH) 1:1:1 suspension Swish and spit 10 mL every 4 (four) hours as needed for mouth pain or discomfort (Patient not taking: Reported on 10/14/2019)    [DISCONTINUED] ALPRAZolam (XANAX) 0 25 mg tablet Take 0 25 mg by mouth 3 (three) times a day    [DISCONTINUED] cetirizine (ZyrTEC) 10 mg tablet Take 10 mg by mouth daily    [DISCONTINUED] ipratropium (ATROVENT) 0 03 % nasal spray 2 sprays into each nostril every 12 (twelve) hours for 30 days    [DISCONTINUED] Melatonin 1 MG CAPS Take 5 mg by mouth     No current facility-administered medications on file prior to visit        She is allergic to lexapro [escitalopram]; robaxin [methocarbamol]; and xanax [alprazolam]            Objective:    Blood pressure 108/67, pulse 79, height 5' 9" (1 753 m), weight 55 8 kg (123 lb), not currently breastfeeding  Physical Exam/Neurological Exam  CONSTITUTIONAL: NAD, pleasant  NECK: supple, no lymphadenopathy, no thyromegaly, no JVD  CARDIOVASCULAR: RRR, normal S1S2, no murmurs, no rubs  RESP: clear to auscultation bilaterally, no wheezes/rhonchi/rales  ABDOMEN: soft, non tender, non distended  SKIN: no rash or skin lesions  EXTREMITIES: no edema, pulses 2+bilaterally  PSYCH: appropriate mood and affect  NEUROLOGIC COMPREHENSIVE EXAM: Patient is oriented to person, place and time, NAD; appropriate affect  CN II, III, IV, V, VI, VII,VIII,IX,X,XI-XII intact with EOMI, PERRLA, OKN intact, VF grossly intact, fundi poorly visualized secondary to pupillary constriction; symmetric face noted  Motor: 5/5 UE/LE bilateral symmetric; Sensory: intact to light touch and pinprick bilaterally; normal vibration sensation feet bilaterally; Coordination within normal limits on FTN and EDISON testing; DTR: 2/4 through, no Babinski, no clonus  Tandem gait is intact  Romberg: negative  ROS:  12 points of review of system was reviewed with the patient and was unremarkable with exception: see HPI  Review of Systems   Neurological: Positive for numbness  Negative for tremors, syncope, speech difficulty and weakness  Hematological: Negative for adenopathy  All other systems reviewed and are negative

## 2019-10-18 ENCOUNTER — OFFICE VISIT (OUTPATIENT)
Dept: FAMILY MEDICINE CLINIC | Facility: CLINIC | Age: 24
End: 2019-10-18
Payer: COMMERCIAL

## 2019-10-18 VITALS
HEIGHT: 69 IN | SYSTOLIC BLOOD PRESSURE: 100 MMHG | HEART RATE: 72 BPM | DIASTOLIC BLOOD PRESSURE: 56 MMHG | BODY MASS INDEX: 18.66 KG/M2 | TEMPERATURE: 98 F | WEIGHT: 126 LBS

## 2019-10-18 DIAGNOSIS — J30.9 ALLERGIC RHINITIS, UNSPECIFIED SEASONALITY, UNSPECIFIED TRIGGER: ICD-10-CM

## 2019-10-18 DIAGNOSIS — J02.9 SORE THROAT: Primary | ICD-10-CM

## 2019-10-18 DIAGNOSIS — K21.9 GASTROESOPHAGEAL REFLUX DISEASE, ESOPHAGITIS PRESENCE NOT SPECIFIED: ICD-10-CM

## 2019-10-18 LAB — S PYO AG THROAT QL: NEGATIVE

## 2019-10-18 PROCEDURE — 87880 STREP A ASSAY W/OPTIC: CPT | Performed by: FAMILY MEDICINE

## 2019-10-18 PROCEDURE — 99213 OFFICE O/P EST LOW 20 MIN: CPT | Performed by: FAMILY MEDICINE

## 2019-10-18 PROCEDURE — 3008F BODY MASS INDEX DOCD: CPT | Performed by: FAMILY MEDICINE

## 2019-10-18 RX ORDER — PREDNISONE 10 MG/1
TABLET ORAL
Qty: 50 TABLET | Refills: 0 | Status: SHIPPED | OUTPATIENT
Start: 2019-10-18 | End: 2020-02-20 | Stop reason: ALTCHOICE

## 2019-10-18 NOTE — ASSESSMENT & PLAN NOTE
Patient is currently using Prevacid and Zantac, noting that she is having improvement in her reflux symptoms  If she has any recurrence, or if there is a question about the current coughing and shortness of breath being related to GERD, could increase to 1 twice a day of Prevacid, and continue the H2 blocker (Zantac)

## 2019-10-18 NOTE — ASSESSMENT & PLAN NOTE
Patient does have continued symptoms that are consistent with allergies  Rapid strep was negative  She is currently on antihistamines, but no improvement  Recommend trial of prednisone  Follow up after that  Patient denies chance of pregnancy

## 2019-10-18 NOTE — PROGRESS NOTES
Assessment and Plan:    Problem List Items Addressed This Visit     Allergic rhinitis     Patient does have continued symptoms that are consistent with allergies  Rapid strep was negative  She is currently on antihistamines, but no improvement  Recommend trial of prednisone  Follow up after that  Patient denies chance of pregnancy  Relevant Medications    predniSONE 10 mg tablet    GERD (gastroesophageal reflux disease)     Patient is currently using Prevacid and Zantac, noting that she is having improvement in her reflux symptoms  If she has any recurrence, or if there is a question about the current coughing and shortness of breath being related to GERD, could increase to 1 twice a day of Prevacid, and continue the H2 blocker (Zantac)  Other Visit Diagnoses     Sore throat    -  Primary    Rapid strep negative  Question allergy verses virus  Trial prednisone  Relevant Medications    predniSONE 10 mg tablet    Other Relevant Orders    POCT rapid strepA (Completed)                 Diagnoses and all orders for this visit:    Sore throat  Comments:  Rapid strep negative  Question allergy verses virus  Trial prednisone  Orders:  -     POCT rapid strepA  -     predniSONE 10 mg tablet; Take 3 BID for 3 days then 2 BID for 3 days then 1 BID for 3 days then 1 QD for 3 days then 1/2 QD for 3 days then stop    Allergic rhinitis, unspecified seasonality, unspecified trigger  -     predniSONE 10 mg tablet; Take 3 BID for 3 days then 2 BID for 3 days then 1 BID for 3 days then 1 QD for 3 days then 1/2 QD for 3 days then stop    Gastroesophageal reflux disease, esophagitis presence not specified    Other orders  -     magnesium oxide (MAG-OX) 400 mg; Take 400 mg by mouth 2 (two) times a day              Subjective:      Patient ID: Krishna Roberts is a 21 y o  female      CC:    Chief Complaint   Patient presents with    Cold Like Symptoms     pt has been sick since last saturday~cd    Cough  post nasal drip    Sore Throat       HPI:    Since Sat, noted increased PNgtt, ST, swelling throat  Some voice changes  Post nasal drip, Runny nose (clear)  No tooth pain, no face pain  Some HA  Used Mucinex DM  Advil, aleve, allegra and atarax daily as well  Patient does have a history of reflux  She has been on Prevacid  She noted that that was not enough to completely control his symptoms, so added Zantac  That seems to have helped so far  She does have a history of allergies  Has been using Allegra and Atarax lately  Prior to this, she was using Zyrtec but it did not seem to help  She was unsure that the Allegra was helping, either  The following portions of the patient's history were reviewed and updated as appropriate: allergies, current medications, past family history, past medical history, past social history, past surgical history and problem list       Review of Systems   Constitutional: Negative  HENT: Positive for congestion, postnasal drip, rhinorrhea and sore throat  Negative for sinus pressure and sinus pain  Respiratory: Negative for shortness of breath  Cardiovascular: Negative  Gastrointestinal: Negative            Data to review:       Objective:    Vitals:    10/18/19 1426   BP: 100/56   BP Location: Left arm   Patient Position: Sitting   Cuff Size: Standard   Pulse: 72   Temp: 98 °F (36 7 °C)   TempSrc: Tympanic   Weight: 57 2 kg (126 lb)   Height: 5' 9" (1 753 m)        Physical Exam

## 2019-10-18 NOTE — PATIENT INSTRUCTIONS
Problem List Items Addressed This Visit     Allergic rhinitis     Patient does have continued symptoms that are consistent with allergies  Rapid strep was negative  She is currently on antihistamines, but no improvement  Recommend trial of prednisone  Follow up after that  Patient denies chance of pregnancy  Relevant Medications    predniSONE 10 mg tablet    GERD (gastroesophageal reflux disease)     Patient is currently using Prevacid and Zantac, noting that she is having improvement in her reflux symptoms  If she has any recurrence, or if there is a question about the current coughing and shortness of breath being related to GERD, could increase to 1 twice a day of Prevacid, and continue the H2 blocker (Zantac)  Other Visit Diagnoses     Sore throat    -  Primary    Rapid strep negative  Question allergy verses virus  Trial prednisone      Relevant Medications    predniSONE 10 mg tablet    Other Relevant Orders    POCT rapid strepA (Completed)

## 2019-10-25 ENCOUNTER — TELEPHONE (OUTPATIENT)
Dept: FAMILY MEDICINE CLINIC | Facility: CLINIC | Age: 24
End: 2019-10-25

## 2019-10-25 NOTE — TELEPHONE ENCOUNTER
TH:  PT WAS SEEN BY  ON THE 18TH AND WAS OUT OF WORK AND SHE WAS OUT OF WORK ON THE 13TH ALSO FOR THE SAME THING, SORE THROAT, REFLUX, ALLERGIC RHINITIS  SHE IS ASKING IF YOU CAN GIVE HER A WORK NOTE FOR THOSE 2 DAYS AND TO FAX IT    ATTN: [address] OFFICE @ FAX# [phone]    ANY QUESTIONS PT CAN BE REACHED -[phone]

## 2019-11-04 ENCOUNTER — TELEPHONE (OUTPATIENT)
Dept: FAMILY MEDICINE CLINIC | Facility: CLINIC | Age: 24
End: 2019-11-04

## 2019-11-04 NOTE — TELEPHONE ENCOUNTER
What services or do we have a list of cognitive behavioral therapists? She is looking for that particular kind please  Please advise  Thank you

## 2019-11-06 NOTE — TELEPHONE ENCOUNTER
I copied Dianne Sim on the message, so that she can give the patient a call and review  There is nothing in particular that we can do for this  I do not feel that she would need CAM at this point, as she is asking specifically for cognitive behavioral therapy

## 2019-11-13 ENCOUNTER — OFFICE VISIT (OUTPATIENT)
Dept: OBGYN CLINIC | Facility: MEDICAL CENTER | Age: 24
End: 2019-11-13
Payer: COMMERCIAL

## 2019-11-13 VITALS — DIASTOLIC BLOOD PRESSURE: 66 MMHG | BODY MASS INDEX: 18.37 KG/M2 | SYSTOLIC BLOOD PRESSURE: 98 MMHG | WEIGHT: 124.4 LBS

## 2019-11-13 DIAGNOSIS — B37.3 VAGINA, CANDIDIASIS: Primary | ICD-10-CM

## 2019-11-13 PROCEDURE — 87480 CANDIDA DNA DIR PROBE: CPT | Performed by: OBSTETRICS & GYNECOLOGY

## 2019-11-13 PROCEDURE — 87660 TRICHOMONAS VAGIN DIR PROBE: CPT | Performed by: OBSTETRICS & GYNECOLOGY

## 2019-11-13 PROCEDURE — 99213 OFFICE O/P EST LOW 20 MIN: CPT | Performed by: OBSTETRICS & GYNECOLOGY

## 2019-11-13 PROCEDURE — 87510 GARDNER VAG DNA DIR PROBE: CPT | Performed by: OBSTETRICS & GYNECOLOGY

## 2019-11-13 RX ORDER — FAMOTIDINE 10 MG
10 TABLET ORAL 2 TIMES DAILY PRN
COMMUNITY
End: 2022-04-22 | Stop reason: ALTCHOICE

## 2019-11-13 NOTE — PROGRESS NOTES
Assessment:   Julio Cesar Gao was seen today for vaginitis  Diagnoses and all orders for this visit:    Vagina, candidiasis  -     terconazole (TERAZOL 3) 0 8 % vaginal cream; Insert 1 applicator into the vagina daily at bedtime for 3 days  -     VAGINOSIS DNA PROBE (AFFIRM)        Plan:  1  Affirm was obtained  2   Will contact pt with results  3  Patient was treated terconazole 0 8% x 3 days  4  Patient given information on vaginitis  CHIEF COMPLAINT: vaginal discharge and itching    Subjective:   Ms Macrina Mayer is a 21 y o  yo female who presents for vaginal discharge, itching and irritation  Her symptoms started 1 weeks ago  No changes in diet, activity, soaps or detergents  Thinks she had a yeast infection in the past   No recent antibiotics  Was on prednisone two weeks ago  On BCP, no changes  No urinary symptoms  ROS:   She denies hematuria, dysuria, constipation, diarrhea, fevers, chills, nausea or emesis      Patient Active Problem List   Diagnosis    Acne    Allergic rhinitis    Generalized anxiety disorder    TMJ syndrome    Menstrual irregularity    Fatigue    Chronic bilateral low back pain without sciatica    Elevated LDL cholesterol level    Other chest pain    Abnormal EKG    Vitamin D deficiency    Myalgia    Encounter for surveillance of contraceptives    Muscle cramps    Lower abdominal pain    Bloating    Dizziness    Skin lump of leg, left    Calf pain    Idiopathic hypotension    Palpitations    GERD (gastroesophageal reflux disease)    Nystagmus    Aphthous ulcer    Paresthesias    Muscle twitching    Insomnia       Past Medical History:   Diagnosis Date    Allergic     Anxiety     Eczema     [facility] pelvis [facility])     Last Assessed:  3/1/17    Jaw fracture ([facility] [address] )     Last Assessed:  3/1/17       Social History     Socioeconomic History    Marital status: Single     Spouse name: Not on file    Number of children: Not on file    Years of education: Not on file    Highest education level: Not on file   Occupational History     Comment: Part-time employement   Social Needs    Financial resource strain: Not on file    Food insecurity:     Worry: Not on file     Inability: Not on file    Transportation needs:     Medical: Not on file     Non-medical: Not on file   Tobacco Use    Smoking status: Never Smoker    Smokeless tobacco: Never Used    Tobacco comment: No secondhand smoke exposure   Substance and Sexual Activity    Alcohol use: Yes     Frequency: Monthly or less     Comment: Social    Drug use: No    Sexual activity: Not Currently     Birth control/protection: Pill   Lifestyle    Physical activity:     Days per week: Not on file     Minutes per session: Not on file    Stress: Not on file   Relationships    Social connections:     Talks on phone: Not on file     Gets together: Not on file     Attends Denominational service: Not on file     Active member of club or organization: Not on file     Attends meetings of clubs or organizations: Not on file     Relationship status: Not on file    Intimate partner violence:     Fear of current or ex partner: Not on file     Emotionally abused: Not on file     Physically abused: Not on file     Forced sexual activity: Not on file   Other Topics Concern    Not on file   Social History Narrative    College student         Current Outpatient Medications:     ALPRAZolam (XANAX) 0 5 mg tablet, Take 0 5 mg by mouth daily at bedtime, Disp: , Rfl: 0    Cholecalciferol (VITAMIN D-3) 5000 units TABS, Take 1 tablet daily, Disp: 1 tablet, Rfl: 0    dicyclomine (BENTYL) 10 mg capsule, Take 1 capsule (10 mg total) by mouth every 8 (eight) hours as needed (Abdominal pain), Disp: 20 capsule, Rfl: 0    DPH-Lido-AlHydr-MgHydr-Simeth (FIRST-MOUTHWASH BLM) SUSP, SWISH AND SPIT 10 ML EVERY 4 (FOUR) HOURS AS NEEDED FOR MOUTH PAIN OR DISCOMFORT, Disp: , Rfl: 0    DULoxetine (CYMBALTA) 20 mg capsule, Take 60 mg by mouth daily , Disp: , Rfl:     famotidine (PEPCID) 10 mg tablet, Take 10 mg by mouth 2 (two) times a day, Disp: , Rfl:     fexofenadine (ALLEGRA) 60 MG tablet, Take 60 mg by mouth daily, Disp: , Rfl:     fluticasone (FLONASE ALLERGY RELIEF) 50 mcg/act nasal spray, into each nostril as needed , Disp: , Rfl:     hydrOXYzine HCL (ATARAX) 25 mg tablet, TAKE 1 TABLET BY MOUTH EVERY 8 HOURS AS NEEDED FOR ANXIETY, Disp: 30 tablet, Rfl: 0    lansoprazole (PREVACID) 30 mg capsule, Take 1 capsule (30 mg total) by mouth daily, Disp: 30 capsule, Rfl: 2    Levonorgest-Eth Estrad 91-Day (DAYSEE) 0 15-0 03 &0 01 MG TABS, Take 1 tablet by mouth daily, Disp: 84 each, Rfl: 3    magnesium oxide (MAG-OX) 400 mg, Take 400 mg by mouth as needed , Disp: , Rfl:     Multiple Vitamins-Minerals (HAIR SKIN AND NAILS FORMULA PO), Take by mouth, Disp: , Rfl:     QUEtiapine (SEROquel) 100 mg tablet, TAKE 1/2 TABLET AT BEDTIME FOR 5 DAYS   THEN TAKE 1 TABLET AT BEDTIME DAILY, Disp: , Rfl: 0    SODIUM FLUORIDE, DENTAL GEL, (DENTA 5000 PLUS) 1 1 % CREA, Denta 5000 Plus 1 1 % cream, Disp: , Rfl:     tiZANidine (ZANAFLEX) 2 mg tablet, Take 2 mg by mouth daily at bedtime, Disp: , Rfl: 2    triamcinolone (KENALOG) 0 1 % cream, APPLY SMALL AMOUNT TWICE A DAY X 2 WEEKS TO THE B/L HANDS AND ELBOW THEN AS NEEDED FOR FLARES, Disp: , Rfl: 0    vitamin B-12 (VITAMIN B-12) 1,000 mcg tablet, Take 5,000 mcg by mouth daily, Disp: , Rfl:     imipramine (TOFRANIL) 50 mg tablet, Take 50 mg by mouth daily at bedtime, Disp: , Rfl: 0    predniSONE 10 mg tablet, Take 3 BID for 3 days then 2 BID for 3 days then 1 BID for 3 days then 1 QD for 3 days then 1/2 QD for 3 days then stop (Patient not taking: Reported on 11/13/2019), Disp: 50 tablet, Rfl: 0    ranitidine (ZANTAC) 150 mg tablet, Take 150 mg by mouth daily , Disp: , Rfl:     terconazole (TERAZOL 3) 0 8 % vaginal cream, Insert 1 applicator into the vagina daily at bedtime for 3 days, Disp: 20 g, Rfl: 0    Allergies   Allergen Reactions    Lexapro [Escitalopram]      Unable to sleep     Robaxin [Methocarbamol] GI Intolerance    Xanax [Alprazolam]      Significant withdrawal symptoms after taking once  BP 98/66   Wt 56 4 kg (124 lb 6 4 oz)   LMP  (LMP Unknown)   BMI 18 37 kg/m²     GEN: The patient was alert and oriented x3, pleasant well-appearing female in no acute distress  Pelvic: Normal appearing external female genitalia, abnormal  vaginal epithelium, normalappearing cervix  positive discharge noted, thick white "cottage cheese" d/c

## 2019-11-13 NOTE — PATIENT INSTRUCTIONS
Thank you for your confidence in our team    We appreciate you and welcome your feedback  If you receive a survey from us, please take a few moments to let us know how we are doing  Sincerely,   Carmina Browning MD  Vulvovaginal Candidiasis   WHAT YOU NEED TO KNOW:   Vulvovaginal candidiasis, or yeast infection, is a common vaginal infection  Vulvovaginal candidiasis is caused by a fungus, or yeast-like germ  Fungi are normally found in your vagina  When there are too many fungi, it can cause an infection  DISCHARGE INSTRUCTIONS:   Return to the emergency department if:   · You have fever and chills  · You are bleeding from your vagina and it is not your monthly period  · You develop abdominal or pelvic pain  Contact your healthcare provider if:   · Your signs and symptoms get worse, even after treatment  · You have questions or concerns about your condition or care  Medicines:   · Medicines  help treat the fungal infection and decrease inflammation  The medicine may be a pill, topical cream, or vaginal suppository  · Take your medicine as directed  Contact your healthcare provider if you think your medicine is not helping or if you have side effects  Tell him of her if you are allergic to any medicine  Keep a list of the medicines, vitamins, and herbs you take  Include the amounts, and when and why you take them  Bring the list or the pill bottles to follow-up visits  Carry your medicine list with you in case of an emergency  Manage your symptoms:   · Wear cotton underwear  · Keep the vaginal area clean and dry  · Do not have sex until your symptoms are gone  · Do not douche  · Do not use feminine hygiene sprays, powders, or bubble bath  Prevent another infection:   · Take showers instead of baths  · Eat yogurt  · Limit the amount of alcohol you drink'    · Control your blood sugar if you are diabetic  · Limit your time in hot tubs    Follow up with your healthcare provider as directed:  Write down your questions so you remember to ask them during your visits  © 2017 [address] Information is for End User's use only and may not be sold, redistributed or otherwise used for commercial purposes  All illustrations and images included in CareNotes® are the copyrighted property of A D A M , Inc  or Thiago Alvarado  The above information is an  only  It is not intended as medical advice for individual conditions or treatments  Talk to your doctor, nurse or pharmacist before following any medical regimen to see if it is safe and effective for you  Thank you for your confidence in our team    We appreciate you and welcome your feedback  If you receive a survey from us, please take a few moments to let us know how we are doing     Sincerely,   Gage Pfeiffer MD

## 2019-11-15 LAB
CANDIDA RRNA VAG QL PROBE: POSITIVE
G VAGINALIS RRNA GENITAL QL PROBE: NEGATIVE
T VAGINALIS RRNA GENITAL QL PROBE: NEGATIVE

## 2019-11-21 DIAGNOSIS — F41.1 GENERALIZED ANXIETY DISORDER: ICD-10-CM

## 2019-11-21 DIAGNOSIS — R00.2 PALPITATIONS: ICD-10-CM

## 2019-11-22 RX ORDER — HYDROXYZINE HYDROCHLORIDE 25 MG/1
TABLET, FILM COATED ORAL
Qty: 30 TABLET | Refills: 0 | Status: SHIPPED | OUTPATIENT
Start: 2019-11-22 | End: 2019-12-30 | Stop reason: SDUPTHER

## 2019-11-25 DIAGNOSIS — Z30.41 ENCOUNTER FOR SURVEILLANCE OF CONTRACEPTIVE PILLS: ICD-10-CM

## 2019-11-26 DIAGNOSIS — B37.3 VAGINA, CANDIDIASIS: Primary | ICD-10-CM

## 2019-11-26 RX ORDER — LEVONORGESTREL / ETHINYL ESTRADIOL AND ETHINYL ESTRADIOL 150-30(84)
1 KIT ORAL DAILY
Qty: 84 EACH | Refills: 1 | Status: SHIPPED | OUTPATIENT
Start: 2019-11-26 | End: 2020-02-20 | Stop reason: ALTCHOICE

## 2019-11-26 NOTE — TELEPHONE ENCOUNTER
Pt seen on 11/13/19  States cream that was prescribe has helped but still experiencing sxs  Pt requesting refill on cream or a stronger medication   What do you suggest ?

## 2019-11-27 ENCOUNTER — TELEPHONE (OUTPATIENT)
Dept: OBGYN CLINIC | Facility: MEDICAL CENTER | Age: 24
End: 2019-11-27

## 2019-11-27 NOTE — TELEPHONE ENCOUNTER
As per communication consent, message left on pt  Voicemail informing that RX  For terconazole will be called to pharmacy

## 2019-11-27 NOTE — TELEPHONE ENCOUNTER
OK to RF of terconazole  There is not really a "stronger" medication  She may try it in a longer course 0 3% cream 1 applicatorful PV qHS x 7 days

## 2019-12-13 ENCOUNTER — SOCIAL WORK (OUTPATIENT)
Dept: BEHAVIORAL/MENTAL HEALTH CLINIC | Facility: CLINIC | Age: 24
End: 2019-12-13
Payer: COMMERCIAL

## 2019-12-13 DIAGNOSIS — F41.0 PANIC DISORDER WITHOUT AGORAPHOBIA: Primary | ICD-10-CM

## 2019-12-13 PROCEDURE — 90832 PSYTX W PT 30 MINUTES: CPT | Performed by: PSYCHIATRY & NEUROLOGY

## 2019-12-13 NOTE — PSYCH
Assessment/Plan:      Diagnoses and all orders for this visit:    Panic disorder without agoraphobia        Session time 4961-2643 (total time 32 minutes)  Subjective:     Patient ID: Louie Blanco is a 21 y o  female  HPI Met with Ramy Segovia initial session  She shared that she has been struggling with extreme anxiety and panic attacks, that mainly manifest in physical symptoms such as muscle tension and twitching  When she has a panic attack, her heart races and she has palpitations, and she feels like she cannot breathe  On the medication she is now on, she has not been having panic attacks, but was at the point of not being able to function at all prior to meds (started a few weeks ago), due to severity and frequency of panic attacks  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[employer], 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  She now feels that she is managing all of her responsibilities better, but still wants to work on reducing anxiety so that she can eventually get off Xanax  Provided anxiety management hand out, and provided education on relaxation/breathing techniques and how they work to reduce physical symptoms of anxiety  Ramy Segovia stated that she already goes to yoga and does some meditation and breathing exercises there, uses essential oils and just went to the first class of mindful meditation series through [facility]  She finds some of these things helpful but not as much as she would like  She will work on exercises discussed today, and will return next week for follow up  Review of Systems   Constitutional: Positive for fatigue  Psychiatric/Behavioral: The patient is nervous/anxious  Objective:     Physical Exam    Psychiatric: calm and cooperative with good eye contact; mood anxious, affect congruent with mood; thought process logical and organized; content normal; concentration intact; speech and behavior normal; good insight and judgment; denies SI HI and psychosis

## 2019-12-30 DIAGNOSIS — F41.1 GENERALIZED ANXIETY DISORDER: ICD-10-CM

## 2019-12-30 DIAGNOSIS — R00.2 PALPITATIONS: ICD-10-CM

## 2019-12-31 RX ORDER — HYDROXYZINE HYDROCHLORIDE 25 MG/1
TABLET, FILM COATED ORAL
Qty: 30 TABLET | Refills: 5 | Status: SHIPPED | OUTPATIENT
Start: 2019-12-31 | End: 2020-03-03 | Stop reason: SDUPTHER

## 2020-02-06 ENCOUNTER — TELEPHONE (OUTPATIENT)
Dept: OBGYN CLINIC | Facility: MEDICAL CENTER | Age: 25
End: 2020-02-06

## 2020-02-06 ENCOUNTER — OFFICE VISIT (OUTPATIENT)
Dept: PHYSICAL THERAPY | Facility: CLINIC | Age: 25
End: 2020-02-06
Payer: COMMERCIAL

## 2020-02-06 DIAGNOSIS — M54.2 NECK PAIN: Primary | ICD-10-CM

## 2020-02-06 PROCEDURE — 97161 PT EVAL LOW COMPLEX 20 MIN: CPT | Performed by: PHYSICAL THERAPIST

## 2020-02-06 PROCEDURE — 97112 NEUROMUSCULAR REEDUCATION: CPT | Performed by: PHYSICAL THERAPIST

## 2020-02-06 PROCEDURE — 97140 MANUAL THERAPY 1/> REGIONS: CPT | Performed by: PHYSICAL THERAPIST

## 2020-02-06 NOTE — TELEPHONE ENCOUNTER
The patient wants to switch birth control due to breaking out  The patients phone number is [phone]

## 2020-02-06 NOTE — PROGRESS NOTES
PT Evaluation     Today's date: 2020  Patient name: Brice Apgar  : 1995  MRN: [id number]  Referring provider: Luzma Almanza PT  Dx:   Encounter Diagnosis     ICD-10-CM    1  Neck pain M54 2                   Assessment  Assessment details: Pt is a 24 yo female presenting with chronic neck pain  She demonstrates significant postural deficits, poor recruitment and endurance of deep neck flexors, poor scapular stability, and the listed functional limitations  She is a good candidate for outpatient physical therapy to address the above impairments and optimize functional status  Functional limitations: pain/stiffness with prolonged school work, interrupted sleeping, limited sitting tolerance  Goals  4-6 weeks  1  Independent with HEP at discharge  2  Improve postural awareness to decrease forward head posture during daily tasks  3  Increase strength of scapular stabilizers by 1 MMT grade to improve posture  4  Tolerate sleeping 4-6 hours without pain to improve quality of life  5  Increase recruitment and endurance of DNF to achieve 10 sec contraction without substitution to improve postural stability  Plan  Plan details: Provided with and reviewed initial written HEP  Reviewed physical exam findings and plan of care  All questions answered to patient's satisfaction  Patient would benefit from: skilled physical therapy  Planned modality interventions: low level laser therapy  Planned therapy interventions: manual therapy, neuromuscular re-education, patient education, therapeutic activities, therapeutic exercise and home exercise program  Frequency: 2x week  Duration in weeks: 6  Treatment plan discussed with: patient        Subjective Evaluation    History of Present Illness  Mechanism of injury: Pt reports she's had neck pain for appx the last year with no apparent cause   She came to PT 1 time for her neck last year, however, due to insurance limitations and her school/work schedule, she was unable to follow through  She reports a general stiffness across her neck  She gets occasional headaches but denies burning, numbness, tingling, or weakness  She arrives today via direct access for outpatient PT  PMH significant for low back pain, fibromyalgia-like symptoms, left pelvic fx 2016, GERD, hypotension  Pain  Current pain ratin  At best pain ratin  At worst pain ratin  Location: neck  Quality: dull ache  Relieving factors: medications  Progression: no change    Social Support    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 [employer]  Exercise history: [employer]ian class 2x/week      Diagnostic Tests  MRI studies: normal  Treatments  No previous or current treatments  Patient Goals  Patient goals for therapy: increased motion, decreased pain and increased strength          Objective     Concurrent Complaints  Positive for disturbed sleep and headaches  Negative for night pain    Postural Observations  Seated posture: poor  Standing posture: poor  Correction of posture: has no consistent effect    Additional Postural Observation Details  Significant forward head posture    Palpation   Left   Hypertonic in the sternocleidomastoid and suboccipitals  Tenderness of the suboccipitals  Right   Hypertonic in the sternocleidomastoid and suboccipitals  Tenderness of the suboccipitals  Active Range of Motion   Cervical/Thoracic Spine       Cervical    Subcranial retraction:   Restriction level: minimal  Flexion:  WFL  Extension: Neck active extension: hinges at C5       WFL  Left lateral flexion:  WFL  Right lateral flexion:  WFL  Left rotation:  WFL  Right rotation:  [facility]    Thoracic    Flexion:  WFL  Extension:  Restriction level: minimal    Joint Play     Hypermobile: C4 and C5     Hypomobile: C7, T1 and T2   Mechanical Assessment    Cervical    Seated retraction: repeated movements   Pain location: no change  Seated Extension: repeated movements  Pain intensity: better  Supine retractation: repeated movements  Pain location: no change  Supine extension: repeated movements  Pain intensity: better    Thoracic      Lumbar      Strength/Myotome Testing     Left Shoulder     Isolated Muscles   Lower trapezius: 4-   Middle trapezius: 4-   Serratus anterior: 4+     Right Shoulder     Isolated Muscles   Lower trapezius: 4-   Middle trapezius: 4-   Serratus anterior: 4+     Tests   Cervical   Positive repeated extension and neck flexor muscle endurance test   Negative craniocervical flexion test     Neuro Exam:     Headaches   Patient reports headaches: Yes                      Precautions: none    Daily Treatment Diary       Manuals 2/6            Supine retraction + extension KT            Prone PA upper T/S, lower C/S KT            Max Laser lower C/S             B SCM str             Exercise Diary              Bwd UBE             CCF w/ biofeedback             Prone YTI *demo            Prone scap sq *demo            Posture tband             B Horiz Abd             D2 flex             Lower trap wall slide + lift off             C/S retraction + ext             T/S ext over foam roll *demo                                                                                                                                                                        Modalities

## 2020-02-07 ENCOUNTER — SOCIAL WORK (OUTPATIENT)
Dept: BEHAVIORAL/MENTAL HEALTH CLINIC | Facility: CLINIC | Age: 25
End: 2020-02-07
Payer: COMMERCIAL

## 2020-02-07 DIAGNOSIS — F41.0 PANIC DISORDER WITHOUT AGORAPHOBIA: Primary | ICD-10-CM

## 2020-02-07 PROCEDURE — 90832 PSYTX W PT 30 MINUTES: CPT | Performed by: PSYCHIATRY & NEUROLOGY

## 2020-02-07 PROCEDURE — 1036F TOBACCO NON-USER: CPT | Performed by: PSYCHIATRY & NEUROLOGY

## 2020-02-07 NOTE — PSYCH
Assessment/Plan:      Diagnoses and all orders for this visit:    Panic disorder without agoraphobia      Session time [id number] (total time 25 minutes)    Subjective:     Patient ID: Jose Juan Longoria is a 25 y o  female  HPI Met with Lance Duarte for follow up  She shared that she is still struggling with physical symptoms of anxiety, with muscle twitches, chest pain and difficulty sleeping and waking up in early hours of the morning  She has been doing mindfulness class through [facility] but has not found it particularly helpful, although she did state that she is not practicing the exercises outside of the class because they are all an hour long  Encouraged Lance Duarte to try to take 10-15 minutes daily to do even some of the breathing exercises, as the intention is that the exercises are done frequently enough to retrain the nervous system to come out of "fight or flight" mode  She said that she will try to do so  She is also going to start acupuncture at the recommendation of the outpatient psychologist that she is seeing  She said the psychologist is very nice, but only gives her some simple suggestions like using a weighted blanket or acupuncture, and does not feel like she is getting anywhere in addressing her anxiety with her  Provided Lance Duarte with two recommendations of therapists that specialize in CBT in the area, and she will call them to try to schedule if they take her insurance (will have [employer] as secondary starting in March)  She is still on significant meds with current psychiatrist, whom she sees monthly, but he will not be accepting her insurance once it changes, so she will have to pay him out of pocket  She was open to referral to [facility] psychiatry and possibly therapist as well, and this writer will make referral today  Also suggested biofeedback therapy and provided recommendation of an agency in [facility] that provides that service    She will investigate all of these options and will call or email this writer for future assistance  Review of Systems      Objective:     Physical Exam    Psychiatric: calm and cooperative with good eye contact; mood euthymic, affect bright; thought process logical and organized; concentration and memory grossly intact; speech and behavior normal; fair insight, judgment intact; denies SI HI and psychosis

## 2020-02-10 ENCOUNTER — OFFICE VISIT (OUTPATIENT)
Dept: PHYSICAL THERAPY | Facility: CLINIC | Age: 25
End: 2020-02-10
Payer: COMMERCIAL

## 2020-02-10 ENCOUNTER — TELEPHONE (OUTPATIENT)
Dept: PSYCHIATRY | Facility: CLINIC | Age: 25
End: 2020-02-10

## 2020-02-10 DIAGNOSIS — M54.2 NECK PAIN: Primary | ICD-10-CM

## 2020-02-10 PROCEDURE — 97140 MANUAL THERAPY 1/> REGIONS: CPT | Performed by: PHYSICAL THERAPIST

## 2020-02-10 PROCEDURE — 97112 NEUROMUSCULAR REEDUCATION: CPT | Performed by: PHYSICAL THERAPIST

## 2020-02-10 NOTE — TELEPHONE ENCOUNTER
----- Message from DEISY Little sent at 2/10/2020  8:04 AM EST -----  [facility] Outpatient Intake Questions    Referred by: Mamta Cueva LCSW    Please advised interviewee that they need to answer all questions truthfully to allow for best care and any misrepresentations of information may affect their ability to be seen at this clinic   => Was this discussed? Yes     Behavorial Health Outpatient Intake History -     Presenting Problem (in patient's words): physical symptoms of anxiety such as muscle twitches, chest tightness, rapid heart beat, nothing physical found related to her symptoms  Currently seeing Dr Abundio Goltz, but he will no longer take her insurance once she is on [place] medical assistance starting in March  Would like to be scheduled with psychiatry for med management    Has the patient ever seen or is currently seeing a psychiatrist? Yes   If yes who/when? Dr Abundio Goltz (not taking new insurance as noted above)    If seen as outpatient, have they been seen here (and by whom)? If not seen here, which provider(s) did the patient see and for how long? Has the patient ever seen or currently see a therapist? Yes If yes who/when? Mamta Cueva twice; referred to another therapist for CBT and possible biofeedback     Has a member of the patient's family been in therapy here? No  If yes, with whom? Has the patient been hospitalized for mental health? No   If yes, how long ago was last hospitalization and where was it? Substance Abuse:No concerns of substance abuse are reported  Does the patient have ICM or CTT? No    Is the patient taking injectable psychiatric medications? No    => If yes, patient cannot be seen here  Communications  Are there any developmental disabilities? No    Does the patient have hearing impairment? No       History-    Has the patient served in the [place] [address]? No    If yes, have you had combat services?  No    Was the patient activated into federal active duty as a member of the [employer] or reserve? No    Legal History-     Does the patient have any history of arrests, prison/snf time, or DUIs? No  If Yes-  What types of charges? When were they last incarcerated? Are they currently on parole or probation? Minor Child-    Who has custody of the child? Is there a custody agreement? If there is a custody agreement remind parent that they must bring a copy to the first appt or they will not be seen  Intake Team, please check with provider before scheduling if flags come up such as:  - complex case  - legal history (other than DUI)  - communication barrier concerns are present  - if, in your judgment, this needs further review    ACCEPTED as a patient Yes  => Appointment Date: Tuesday, 4/21/20 @ 2pm w/ Dr Chayo Calderon? No    Name of Insurance Co: [facility] ID#: ** effective March 1st, 2020 [facility]  If ins is primary or secondary  If patient is a minor, parents information such as Name, D  O B of guarantor

## 2020-02-10 NOTE — PROGRESS NOTES
Daily Note     Today's date: 2/10/2020  Patient name: Jesus Cruz  : 1995  MRN: [id number]  Referring provider: Milo Heard, PT  Dx:   Encounter Diagnosis     ICD-10-CM    1  Neck pain M54 2                   Subjective: Pt reports she's doing the exercises regularly  Objective: See treatment diary below      Assessment: Tolerated treatment well  Patient exhibited good technique with therapeutic exercises and would benefit from continued PT  Pt reported decreased stiffness after manual treatment including laser  Plan: Continue per plan of care              Precautions: none    Daily Treatment Diary       Manuals 2/6 2/10           Supine retraction + extension KT KT           Prone PA upper T/S, lower C/S KT KT           Max Laser lower C/S  4'           B SCM str             Exercise Diary              Bwd UBE  6'           CCF w/ biofeedback  26 mmHG 10"x10           Prone YTI *demo            Prone scap sq *demo            Posture tband             B Horiz Abd             D2 flex             Lower trap wall slide + lift off             C/S retraction + ext  5"x10           T/S ext over foam roll *demo                                                                                                                                                                        Modalities

## 2020-02-11 NOTE — TELEPHONE ENCOUNTER
Please clarify if she is also seeing Dr Marlin Ramirez - he has been prescribing Xanax regularly for her since 6/2019 as per PDMP   Dr Daphne Gage is not listed there as a prescribing psychiatrist

## 2020-02-12 ENCOUNTER — OFFICE VISIT (OUTPATIENT)
Dept: FAMILY MEDICINE CLINIC | Facility: CLINIC | Age: 25
End: 2020-02-12
Payer: COMMERCIAL

## 2020-02-12 VITALS
WEIGHT: 128 LBS | DIASTOLIC BLOOD PRESSURE: 66 MMHG | BODY MASS INDEX: 18.96 KG/M2 | RESPIRATION RATE: 16 BRPM | HEART RATE: 68 BPM | HEIGHT: 69 IN | SYSTOLIC BLOOD PRESSURE: 102 MMHG | TEMPERATURE: 98.9 F

## 2020-02-12 DIAGNOSIS — J34.2 DEVIATED SEPTUM: ICD-10-CM

## 2020-02-12 DIAGNOSIS — J30.9 ALLERGIC RHINITIS, UNSPECIFIED SEASONALITY, UNSPECIFIED TRIGGER: Primary | ICD-10-CM

## 2020-02-12 DIAGNOSIS — J30.0 VASOMOTOR RHINITIS: ICD-10-CM

## 2020-02-12 PROCEDURE — 1036F TOBACCO NON-USER: CPT | Performed by: NURSE PRACTITIONER

## 2020-02-12 PROCEDURE — 3008F BODY MASS INDEX DOCD: CPT | Performed by: NURSE PRACTITIONER

## 2020-02-12 PROCEDURE — 99213 OFFICE O/P EST LOW 20 MIN: CPT | Performed by: NURSE PRACTITIONER

## 2020-02-12 RX ORDER — LEVOCETIRIZINE DIHYDROCHLORIDE 5 MG/1
5 TABLET, FILM COATED ORAL EVERY EVENING
COMMUNITY
End: 2020-05-27

## 2020-02-12 NOTE — PROGRESS NOTES
Assessment and Plan:    Problem List Items Addressed This Visit        Respiratory    Allergic rhinitis - Primary    Relevant Orders    Ambulatory referral to Allergy      Other Visit Diagnoses     Deviated septum        Vasomotor rhinitis                     Diagnoses and all orders for this visit:    Allergic rhinitis, unspecified seasonality, unspecified trigger  -     Ambulatory referral to Allergy; Future    Deviated septum    Vasomotor rhinitis    Other orders  -     levocetirizine (XYZAL) 5 MG tablet; Take 5 mg by mouth every evening              Subjective:      Patient ID: Aixa Tovar is a 25 y o  female  CC:    Chief Complaint   Patient presents with    Allergies     Patient present today for possible allergies inlcuding runny nose and on and off epixtasis for the past couple of months  HPI:    Patient notes she has constant runny nose for almost a year  She also note that about 3 weeks she noticed that most of the time when blowing her nose she notes some bloody drainage  Patient states she believed she had seasonal allergies  This is the first visit in which she has been evaluated for this  She notes she tried allegra for 2 months, zyrtec, claritin  Patient states she most recently tried xyzal  She takes atarax daily at nighttime  She state she has astelin, flonase, ipatropium  She states she also used nasal saline which has not helped  She also been using cool mist humidification in her room  Patient notes she notes cold air and cold weather that trigger her runny nose which she notes all year round  She has never had allergy testing  She has never seen ENT  Patient notes she also notes occasional ear plugging and does note pulse  Her ear after not taking allergy medication for a few days at a time         The following portions of the patient's history were reviewed and updated as appropriate: allergies, current medications, past family history, past medical history, past social history, past surgical history and problem list       Review of Systems   Constitutional: Negative for chills, fatigue and fever  HENT: Positive for congestion, nosebleeds (blood tinged ), rhinorrhea, sneezing (on and off ) and tinnitus  Negative for postnasal drip, sinus pressure, sore throat and trouble swallowing  As noted in the HPI    Respiratory: Negative for cough, chest tightness and shortness of breath  Neurological: Negative for dizziness and headaches (occasional )  Data to review:       Objective:    Vitals:    02/12/20 1537   BP: 102/66   BP Location: Left arm   Patient Position: Sitting   Cuff Size: Standard   Pulse: 68   Resp: 16   Temp: 98 9 °F (37 2 °C)   TempSrc: Temporal   Weight: 58 1 kg (128 lb)   Height: 5' 9" (1 753 m)        Physical Exam   Constitutional: She is oriented to person, place, and time  She appears well-developed and well-nourished  HENT:   Head: Normocephalic and atraumatic  Right Ear: Tympanic membrane normal    Left Ear: Tympanic membrane normal    Nose: Mucosal edema (right nare ) and septal deviation present  No sinus tenderness or nasal deformity  Right sinus exhibits no maxillary sinus tenderness  Left sinus exhibits no maxillary sinus tenderness  Dry mucous membranes bilateral nares    Cardiovascular: Normal rate, regular rhythm and normal heart sounds  Pulmonary/Chest: Effort normal and breath sounds normal    Neurological: She is alert and oriented to person, place, and time  Nursing note and vitals reviewed

## 2020-02-12 NOTE — TELEPHONE ENCOUNTER
Dr Janice Paniagua,      I spoke w/ pt and she stated she is a current pt of Dr Natasha Chowdary, and that she is not sure who Dr Joe Marcelo is  (listed in intake by Lennis Primrose as pt's current Psychiatrist)  Pt states she has never seen Dr Joe Marcelo and Dr Naatsha Chowdary is the one prescribing her Xanax        Lidchristianoe Sourav

## 2020-02-12 NOTE — PATIENT INSTRUCTIONS
Recommend using flonase twice daily, then to use ocean nasal saline spray twice daily in between  Continue to use atarax nightly  Continue with cool mist humidification in your bedroom     Next step to see the allergist

## 2020-02-17 ENCOUNTER — OFFICE VISIT (OUTPATIENT)
Dept: PHYSICAL THERAPY | Facility: CLINIC | Age: 25
End: 2020-02-17
Payer: COMMERCIAL

## 2020-02-17 DIAGNOSIS — M54.2 NECK PAIN: Primary | ICD-10-CM

## 2020-02-17 PROCEDURE — 97140 MANUAL THERAPY 1/> REGIONS: CPT | Performed by: PHYSICAL THERAPIST

## 2020-02-17 PROCEDURE — 97112 NEUROMUSCULAR REEDUCATION: CPT | Performed by: PHYSICAL THERAPIST

## 2020-02-17 NOTE — PROGRESS NOTES
Daily Note     Today's date: 2020  Patient name: Louie Blanco  : 1995  MRN: [id number]  Referring provider: Nilda Kirkpatrick, PT  Dx:   Encounter Diagnosis     ICD-10-CM    1  Neck pain M54 2                   Subjective: Pt reports she's feeling a little better and no longer having the pain at the base of her neck  The pain is now in her suboccipitals and only occasional       Objective: See treatment diary below      Assessment: Tolerated treatment well  Patient demonstrated fatigue post treatment, exhibited good technique with therapeutic exercises and would benefit from continued PT      Plan: Continue per plan of care              Precautions: none    Daily Treatment Diary       Manuals 2/6 2/10 2/17          Supine retraction + extension KT KT KT          Prone PA upper T/S, lower C/S KT KT KT          Max Laser lower C/S  4' 4' Subocc          B SCM str             Exercise Diary              Bwd UBE  6' 6'          CCF w/ biofeedback  26 mmHG 10"x10 28 mmHg 10"x10          Prone YTI *demo  20x ea 1#         Prone scap sq *demo  5"x20          Posture tband   15x ea grn          B Horiz Abd   15x grn          D2 flex   15x grn          Lower trap wall slide + lift off   15x grn          C/S retraction + ext  5"x10 5"x10          T/S ext over foam roll *demo                                                                                                                                                                        Modalities

## 2020-02-18 ENCOUNTER — OFFICE VISIT (OUTPATIENT)
Dept: OBGYN CLINIC | Facility: MEDICAL CENTER | Age: 25
End: 2020-02-18
Payer: COMMERCIAL

## 2020-02-18 VITALS
DIASTOLIC BLOOD PRESSURE: 60 MMHG | WEIGHT: 129 LBS | SYSTOLIC BLOOD PRESSURE: 100 MMHG | HEIGHT: 69 IN | BODY MASS INDEX: 19.11 KG/M2

## 2020-02-18 DIAGNOSIS — Z30.41 ENCOUNTER FOR SURVEILLANCE OF CONTRACEPTIVE PILLS: ICD-10-CM

## 2020-02-18 DIAGNOSIS — B37.3 VAGINAL CANDIDIASIS: Primary | ICD-10-CM

## 2020-02-18 PROCEDURE — 99214 OFFICE O/P EST MOD 30 MIN: CPT | Performed by: OBSTETRICS & GYNECOLOGY

## 2020-02-18 PROCEDURE — 1036F TOBACCO NON-USER: CPT | Performed by: OBSTETRICS & GYNECOLOGY

## 2020-02-18 PROCEDURE — 87210 SMEAR WET MOUNT SALINE/INK: CPT | Performed by: OBSTETRICS & GYNECOLOGY

## 2020-02-18 RX ORDER — FLUCONAZOLE 150 MG/1
150 TABLET ORAL
Qty: 2 TABLET | Refills: 0 | Status: SHIPPED | OUTPATIENT
Start: 2020-02-18 | End: 2020-02-22

## 2020-02-18 RX ORDER — NORETHINDRONE ACETATE AND ETHINYL ESTRADIOL 1; .02 MG/1; MG/1
1 TABLET ORAL DAILY
Qty: 28 TABLET | Refills: 11 | Status: SHIPPED | OUTPATIENT
Start: 2020-02-18 | End: 2020-05-13 | Stop reason: SDUPTHER

## 2020-02-20 ENCOUNTER — OFFICE VISIT (OUTPATIENT)
Dept: PHYSICAL THERAPY | Facility: CLINIC | Age: 25
End: 2020-02-20
Payer: COMMERCIAL

## 2020-02-20 DIAGNOSIS — M54.2 NECK PAIN: Primary | ICD-10-CM

## 2020-02-20 LAB
BV WHIFF TEST VAG QL: ABNORMAL
CLUE CELLS SPEC QL WET PREP: ABNORMAL
T VAGINALIS VAG QL WET PREP: ABNORMAL
YEAST VAG QL WET PREP: POSITIVE

## 2020-02-20 PROCEDURE — 97112 NEUROMUSCULAR REEDUCATION: CPT

## 2020-02-20 PROCEDURE — 97140 MANUAL THERAPY 1/> REGIONS: CPT

## 2020-02-20 NOTE — PROGRESS NOTES
Daily Note     Today's date: 2020  Patient name: Jose Juan Longoria  : 1995  MRN: [id number]  Referring provider: Norbert Lopez PT  Dx:   Encounter Diagnosis     ICD-10-CM    1  Neck pain M54 2                   Subjective: Pt reports she has "a good ammount" of pain, /10 occipital area  Notes some L arm muscle soreness from performing push ups yesterday  Objective: See treatment diary below      Assessment: Some fatigue with exercise progressions as below  Required vc'ing throughout exercise program  KT performed manual therapies  Tolerated treatment well  Patient would benefit from cont therapy  Plan: Continue per plan of care              Precautions: none    Daily Treatment Diary       Manuals 2/6 2/10 2/17 2/20         Supine retraction + extension KT KT KT KT         Prone PA upper T/S, lower C/S KT KT KT KT         Max Laser lower C/S  4' 4' Subocc 4' sub  occ         B/L OA mobs    KT         Exercise Diary              Bwd UBE  6' 6' 6'         CCF w/ biofeedback  26 mmHG 10"x10 28 mmHg 10"x10 28  mmHG  10x10"         Prone YTI *demo  20x ea 1#x15  ea         Prone scap sq *demo  5"x20 5"x20         Posture tband   15x ea grn 15x  ea grn         B Horiz Abd   15x grn 15x  grn         D2 flex   15x grn 15x  grn         Lower trap wall slide + lift off   15x grn 15x         C/S retraction + ext  5"x10 5"x10 5"x10         T/S ext over foam roll *demo                                                                                                                                                                        Modalities

## 2020-02-20 NOTE — PROGRESS NOTES
Assessment:  25 y catrachita Cullen who presents with yeast infection  Plan:  Diagnoses and all orders for this visit:    Vaginal candidiasis  -     fluconazole (DIFLUCAN) 150 mg tablet; Take 1 tablet (150 mg total) by mouth every 3 (three) days for 2 doses    Encounter for surveillance of contraceptive pills  -     norethindrone-ethinyl estradiol (MICROGESTIN 1/20) 1-20 MG-MCG per tablet; Take 1 tablet by mouth daily  - Return in 3mo if unhappy with change        __________________________________________________________________    Subjective   Vinny Estimable is a 25 y o  Maureen Cullen who presents with concern for yeast infection  Patient reports she had one in November last  She was treated with terconzaole with no relief at first, but after extended therapy she had relief  She felt her symptoms resolved after the next menses, but have since returned  She reports a 2-3wk hx of thick, white discharge and itching  Denies labial rashes or lesions  Denies abnormal bleeding  Denies odor  Symptoms unchanged since onset  Has not tried anything yet to treat  Patient also wants to discuss switching birth control  She was initially on seasonique due to wanting to skip menses, but she notes since starting her acne has severely worsened  She's always had acne problems, but feels they're worse on this birth control  She is likely going to start Accutane because of it  Wants to know if theres any other pills she can try  Also feels like shes getting more breakthrough bleeding lately and would rather return to a traditional OCP  Advised on lower androgenic progestins and will switch to a more favorable acne profile  The following portions of the patient's history were reviewed and updated as appropriate: allergies, current medications, past medical history, past social history, past surgical history and problem list     Review of Systems  Review of Systems   Constitutional: Negative for chills and fever     Gastrointestinal: Negative for abdominal distention, abdominal pain and nausea  Genitourinary: Positive for vaginal discharge and vaginal pain (itch)  Negative for dysuria, genital sores, hematuria, menstrual problem, pelvic pain and vaginal bleeding  Skin: Positive for rash (+Acne)  Objective  /60   Ht 5' 9" (1 753 m)   Wt 58 5 kg (129 lb)   LMP 02/04/2020   BMI 19 05 kg/m²      Physical Exam:  Physical Exam   Constitutional: She is oriented to person, place, and time  She appears well-developed and well-nourished  No distress  HENT:   Head: Normocephalic and atraumatic  Eyes: No scleral icterus  Cardiovascular: Normal rate  Pulmonary/Chest: Effort normal  No accessory muscle usage  No respiratory distress  Abdominal: Soft  She exhibits no distension  There is no tenderness  There is no rebound and no guarding  Genitourinary: There is no rash, tenderness or lesion on the right labia  There is no rash, tenderness or lesion on the left labia  Cervix exhibits no motion tenderness, no discharge and no friability  There is erythema in the vagina  No tenderness or bleeding in the vagina  No signs of injury around the vagina  Vaginal discharge ( thick, white discharge) found  Musculoskeletal: She exhibits no edema or tenderness  Neurological: She is alert and oriented to person, place, and time  Skin: Skin is warm and dry  Rash noted  Rash is papular (+papular acne in various stages of healing on jaw line, cheeks, and chin)  No erythema  Psychiatric: She has a normal mood and affect   Her behavior is normal      Wet mount / KOH: +yeast

## 2020-02-24 ENCOUNTER — APPOINTMENT (OUTPATIENT)
Dept: PHYSICAL THERAPY | Facility: CLINIC | Age: 25
End: 2020-02-24
Payer: COMMERCIAL

## 2020-02-27 ENCOUNTER — OFFICE VISIT (OUTPATIENT)
Dept: PHYSICAL THERAPY | Facility: CLINIC | Age: 25
End: 2020-02-27
Payer: COMMERCIAL

## 2020-02-27 DIAGNOSIS — M54.2 NECK PAIN: Primary | ICD-10-CM

## 2020-02-27 PROCEDURE — 97140 MANUAL THERAPY 1/> REGIONS: CPT

## 2020-02-27 PROCEDURE — 97112 NEUROMUSCULAR REEDUCATION: CPT

## 2020-02-27 NOTE — PROGRESS NOTES
Daily Note     Today's date: 2020  Patient name: Zion Gamboa  : 1995  MRN: [id number]  Referring provider: Kavon Davila, PT  Dx:   Encounter Diagnosis     ICD-10-CM    1  Neck pain M54 2                   Subjective: Pt reports she feels pretty good, just tired  Objective: See treatment diary below      Assessment: Able to jamie exercise progressions as below  Cont to require vc'ing throughout exercise program  KT performed manual therapies responded well to same  Issued blue TB and GTB, as well as updated written HEP instructions, reviewed same w/pt  Tolerated treatment well  Patient would benefit from cont therapy  Plan: Continue per plan of care              Precautions: none    Daily Treatment Diary       Manuals 2/6 2/10 2/17 2/20 2/27        Supine retraction + extension KT KT KT KT KT        Prone PA upper T/S, lower C/S KT KT KT KT KT D/C       Max Laser lower C/S  4' 4' Subocc 4' sub  occ 4' sub  occ          B/L OA mobs    KT KT D/C       Exercise Diary              Bwd UBE  6' 6' 6' 6'        CCF w/ biofeedback  26 mmHG 10"x10 28 mmHg 10"x10 28  mmHG  10x10" 28  mmHG  10x1"        Prone YTI *demo  20x ea 1#x15  ea 1#x20  ea        Prone scap sq *demo  5"x20 5"x20 5"x20        Posture tband   15x ea grn 15x  ea grn Blue  x30ea        B Horiz Abd   15x grn 15x  grn Blue  15x        D2 flex   15x grn 15x  grn 20x  grn        Lower trap wall slide + lift off   15x grn 15x 15x  grn        C/S retraction + ext  5"x10 5"x10 5"x10 5"x10        T/S ext over foam roll *demo            Supine tuck into towel w/rotation     5"x5ea        Self SOR w/tennis balls     2'                                                                                                                                          Modalities

## 2020-03-02 ENCOUNTER — APPOINTMENT (OUTPATIENT)
Dept: PHYSICAL THERAPY | Facility: CLINIC | Age: 25
End: 2020-03-02
Payer: COMMERCIAL

## 2020-03-03 DIAGNOSIS — R00.2 PALPITATIONS: ICD-10-CM

## 2020-03-03 DIAGNOSIS — F41.1 GENERALIZED ANXIETY DISORDER: ICD-10-CM

## 2020-03-03 RX ORDER — HYDROXYZINE HYDROCHLORIDE 25 MG/1
25 TABLET, FILM COATED ORAL EVERY 8 HOURS PRN
Qty: 90 TABLET | Refills: 1 | Status: SHIPPED | OUTPATIENT
Start: 2020-03-03 | End: 2020-04-21 | Stop reason: SDUPTHER

## 2020-03-05 ENCOUNTER — APPOINTMENT (OUTPATIENT)
Dept: PHYSICAL THERAPY | Facility: CLINIC | Age: 25
End: 2020-03-05
Payer: COMMERCIAL

## 2020-03-10 ENCOUNTER — OFFICE VISIT (OUTPATIENT)
Dept: PHYSICAL THERAPY | Facility: CLINIC | Age: 25
End: 2020-03-10
Payer: COMMERCIAL

## 2020-03-10 DIAGNOSIS — M54.2 NECK PAIN: Primary | ICD-10-CM

## 2020-03-10 PROCEDURE — 97112 NEUROMUSCULAR REEDUCATION: CPT | Performed by: PHYSICAL THERAPIST

## 2020-03-10 PROCEDURE — 97140 MANUAL THERAPY 1/> REGIONS: CPT | Performed by: PHYSICAL THERAPIST

## 2020-03-10 PROCEDURE — 97110 THERAPEUTIC EXERCISES: CPT | Performed by: PHYSICAL THERAPIST

## 2020-03-10 NOTE — PROGRESS NOTES
Daily Note     Today's date: 3/10/2020  Patient name: Rolando Uribe  : 1995  MRN: [id number]  Referring provider: Karly Hurley, PT  Dx:   Encounter Diagnosis     ICD-10-CM    1  Neck pain M54 2                   Subjective: Pt reports minimal changes and that her forward head posture is "just the way she is"  Objective: See treatment diary below      Assessment: Tolerated treatment well  Patient exhibited good technique with therapeutic exercises  Challenged with addition of body blade  Plan: Continue per plan of care              Precautions: none    Daily Treatment Diary       Manuals 2/6 2/10 2/17 2/20 2/27 3/10       Supine retraction + extension KT KT KT KT KT KT       Prone PA upper T/S, lower C/S KT KT KT KT KT D/C       Max Laser lower C/S  4' 4' Subocc 4' sub  occ 4' sub  occ   NV       B/L OA mobs    KT KT D/C       Exercise Diary              Bwd UBE  6' 6' 6' 6' 10'       CCF w/ biofeedback  26 mmHG 10"x10 28 mmHg 10"x10 28  mmHG  10x10" 28  mmHG  10x1" 30mmHg 10"x10       Prone YTI *demo  20x ea 1#x15  ea 1#x20  ea 1# 20x ea       Prone scap sq *demo  5"x20 5"x20 5"x20 5"x20       Posture tband   15x ea grn 15x  ea grn Blue  x30ea 30x ea blue       B Horiz Abd   15x grn 15x  grn Blue  15x 20x blue       D2 flex   15x grn 15x  grn 20x  grn 20x blue       Lower trap wall slide + lift off   15x grn 15x 15x  grn        C/S retraction + ext  5"x10 5"x10 5"x10 5"x10 5"x5 supine       T/S ext over foam roll *demo            Supine tuck into towel w/rotation     5"x5ea 20"x3 ea       Self SOR w/tennis balls     2'        Bilat flex/ext body blade      1'x2                                                                                                                            Modalities

## 2020-03-12 ENCOUNTER — APPOINTMENT (OUTPATIENT)
Dept: PHYSICAL THERAPY | Facility: CLINIC | Age: 25
End: 2020-03-12
Payer: COMMERCIAL

## 2020-03-16 ENCOUNTER — OFFICE VISIT (OUTPATIENT)
Dept: PHYSICAL THERAPY | Facility: CLINIC | Age: 25
End: 2020-03-16
Payer: COMMERCIAL

## 2020-03-16 DIAGNOSIS — M54.2 NECK PAIN: Primary | ICD-10-CM

## 2020-03-16 PROCEDURE — 97110 THERAPEUTIC EXERCISES: CPT | Performed by: PHYSICAL THERAPIST

## 2020-03-16 PROCEDURE — 97112 NEUROMUSCULAR REEDUCATION: CPT | Performed by: PHYSICAL THERAPIST

## 2020-03-16 NOTE — PROGRESS NOTES
Daily Note     Today's date: 3/16/2020  Patient name: Louie Blanco  : 1995  MRN: [id number]  Referring provider: Nilda Kirkpatrick, PT  Dx:   Encounter Diagnosis     ICD-10-CM    1  Neck pain M54 2                   Subjective: Pt reports her neck pain has been consistent  Objective: See treatment diary below      Assessment: Tolerated treatment well  Patient demonstrated fatigue post treatment      Plan: Continue per plan of care              Precautions: none    Daily Treatment Diary       Manuals 2/6 2/10 2/17 2/20 2/27 3/10 3/16      Supine retraction + extension KT KT KT KT KT KT       Prone PA upper T/S, lower C/S KT KT KT KT KT D/C       Max Laser lower C/S  4' 4' Subocc 4' sub  occ 4' sub  occ   NV       B/L OA mobs    KT KT D/C       Exercise Diary              Bwd UBE  6' 6' 6' 6' 10' 10'      CCF w/ biofeedback  26 mmHG 10"x10 28 mmHg 10"x10 28  mmHG  10x10" 28  mmHG  10x1" 30mmHg 10"x10 30mmHg 10"x10      Prone YTI *demo  20x ea 1#x15  ea 1#x20  ea 1# 20x ea 1# 25x ea      Prone scap sq *demo  5"x20 5"x20 5"x20 5"x20 5"x20      Posture tband   15x ea grn 15x  ea grn Blue  x30ea 30x ea blue 30x ea blue      B Horiz Abd   15x grn 15x  grn Blue  15x 20x blue 20x blue      D2 flex   15x grn 15x  grn 20x  grn 20x blue 20x blue      Lower trap wall slide + lift off   15x grn 15x 15x  grn        C/S retraction + ext  5"x10 5"x10 5"x10 5"x10 5"x5 supine 5"x10 supine      T/S ext over foam roll *demo            Supine tuck into towel w/rotation     5"x5ea 20"x3 ea 20"x3 ea      Self SOR w/tennis balls     2'        Bilat flex/ext body blade      1'x2 15"x4                                                                                                                           Modalities

## 2020-03-19 ENCOUNTER — OFFICE VISIT (OUTPATIENT)
Dept: PHYSICAL THERAPY | Facility: CLINIC | Age: 25
End: 2020-03-19
Payer: COMMERCIAL

## 2020-03-19 DIAGNOSIS — M54.2 NECK PAIN: Primary | ICD-10-CM

## 2020-03-19 PROCEDURE — 97110 THERAPEUTIC EXERCISES: CPT

## 2020-03-19 PROCEDURE — 97112 NEUROMUSCULAR REEDUCATION: CPT

## 2020-03-19 NOTE — PROGRESS NOTES
Daily Note     Today's date: 3/19/2020  Patient name: Erik Parikh  : 1995  MRN: [id number]  Referring provider: Ebony Martinez PT  Dx:   Encounter Diagnosis     ICD-10-CM    1  Neck pain M54 2                   Subjective: Pt reports her neck pain "has been pretty good "    Objective: See treatment diary below      Assessment: Performed exercise program w/o complaint  Fatigues during body blade exercise  Tolerated treatment well  Will monitor  Plan: Continue per plan of care              Precautions: none    Daily Treatment Diary       Manuals 2/6 2/10 2/17 2/20 2/27 3/10 3/16 3/19     Supine retraction + extension KT KT KT KT KT KT       Prone PA upper T/S, lower C/S KT KT KT KT KT D/C       Max Laser lower C/S  4' 4' Subocc 4' sub  occ 4' sub  occ   NV       B/L OA mobs    KT KT D/C       Exercise Diary              Bwd UBE  6' 6' 6' 6' 10' 10' 10'     CCF w/ biofeedback  26 mmHG 10"x10 28 mmHg 10"x10 28  mmHG  10x10" 28  mmHG  10x1" 30mmHg 10"x10 30mmHg 10"x10 30mm  Hg  10"x10     Prone YTI *demo  20x ea 1#x15  ea 1#x20  ea 1# 20x ea 1# 25x ea 1#  25x ea     Prone scap sq *demo  5"x20 5"x20 5"x20 5"x20 5"x20 5"x20     Posture tband   15x ea grn 15x  ea grn Blue  x30ea 30x ea blue 30x ea blue 30x ea  Blue       B Horiz Abd   15x grn 15x  grn Blue  15x 20x blue 20x blue 20x  blue     D2 flex   15x grn 15x  grn 20x  grn 20x blue 20x blue 20x  blue     Lower trap wall slide + lift off   15x grn 15x 15x  grn        C/S retraction + ext  5"x10 5"x10 5"x10 5"x10 5"x5 supine 5"x10 supine 5"x10  supine     T/S ext over foam roll *demo            Supine tuck into towel w/rotation     5"x5ea 20"x3 ea 20"x3 ea 20"x3  ea     Self SOR w/tennis balls     2'        Bilat flex/ext body blade      1'x2 15"x4 15"x4                                                                                                                          Modalities

## 2020-03-23 ENCOUNTER — OFFICE VISIT (OUTPATIENT)
Dept: PHYSICAL THERAPY | Facility: CLINIC | Age: 25
End: 2020-03-23
Payer: COMMERCIAL

## 2020-03-23 DIAGNOSIS — M54.2 NECK PAIN: Primary | ICD-10-CM

## 2020-03-23 PROCEDURE — 97110 THERAPEUTIC EXERCISES: CPT | Performed by: PHYSICAL THERAPIST

## 2020-03-23 PROCEDURE — 97112 NEUROMUSCULAR REEDUCATION: CPT | Performed by: PHYSICAL THERAPIST

## 2020-03-23 NOTE — PROGRESS NOTES
Daily Note     Today's date: 3/23/2020  Patient name: Anshul Almaraz  : 1995  MRN: [id number]  Referring provider: Sorin Rodriguez PT  Dx:   Encounter Diagnosis     ICD-10-CM    1  Neck pain M54 2                   Subjective: "I feel surprisingly good"  Objective: See treatment diary below      Assessment: Tolerated treatment well  Patient exhibited good technique with therapeutic exercises      Plan: Continue per plan of care              Precautions: none    Daily Treatment Diary       Manuals 2/6 2/10 2/17 2/20 2/27 3/10 3/16 3/19 3/23    Supine retraction + extension KT KT KT KT KT KT       Prone PA upper T/S, lower C/S KT KT KT KT KT D/C       Max Laser lower C/S  4' 4' Subocc 4' sub  occ 4' sub  occ   NV       B/L OA mobs    KT KT D/C       Exercise Diary              Bwd UBE  6' 6' 6' 6' 10' 10' 10' 10'    CCF w/ biofeedback  26 mmHG 10"x10 28 mmHg 10"x10 28  mmHG  10x10" 28  mmHG  10x1" 30mmHg 10"x10 30mmHg 10"x10 30mm  Hg  10"x10 30mmHg 10"x10    Prone YTI *demo  20x ea 1#x15  ea 1#x20  ea 1# 20x ea 1# 25x ea 1#  25x ea 2# 30x ea    Prone scap sq *demo  5"x20 5"x20 5"x20 5"x20 5"x20 5"x20     Posture tband   15x ea grn 15x  ea grn Blue  x30ea 30x ea blue 30x ea blue 30x ea  Blue   25x blue    B Horiz Abd   15x grn 15x  grn Blue  15x 20x blue 20x blue 20x  blue 25x blue    D2 flex   15x grn 15x  grn 20x  grn 20x blue 20x blue 20x  blue 25x blue    Lower trap wall slide + lift off   15x grn 15x 15x  grn        C/S retraction + ext  5"x10 5"x10 5"x10 5"x10 5"x5 supine 5"x10 supine 5"x10  supine 5"x10 supine    T/S ext over foam roll *demo            Supine tuck into towel w/rotation     5"x5ea 20"x3 ea 20"x3 ea 20"x3  ea 20"x3 ea    Self SOR w/tennis balls     2'        Bilat flex/ext body blade      1'x2 15"x4 15"x4 30"x4                                                                                                                         Modalities

## 2020-03-26 ENCOUNTER — OFFICE VISIT (OUTPATIENT)
Dept: PHYSICAL THERAPY | Facility: CLINIC | Age: 25
End: 2020-03-26
Payer: COMMERCIAL

## 2020-03-26 DIAGNOSIS — M54.2 NECK PAIN: Primary | ICD-10-CM

## 2020-03-26 PROCEDURE — 97112 NEUROMUSCULAR REEDUCATION: CPT

## 2020-03-26 PROCEDURE — 97110 THERAPEUTIC EXERCISES: CPT

## 2020-03-26 NOTE — PROGRESS NOTES
Daily Note     Today's date: 3/26/2020  Patient name: Ilan Platt  : 1995  MRN: [id number]  Referring provider: Maria Teresa Spring PT  Dx:   Encounter Diagnosis     ICD-10-CM    1  Neck pain M54 2               Subjective: "I feel surprisingly good"  Objective: See treatment diary below      Assessment: Performed exercise program w/o complaint of pain/discomfort, although fatigued with prone exercises and body blade  D/C at this time  Plan: D/C to HEP        Precautions: none    Daily Treatment Diary       Manuals 2/6 2/10 2/17 2/20 2/27 3/10 3/16 3/19 3/23 3/26   Supine retraction + extension KT KT KT KT KT KT       Prone PA upper T/S, lower C/S KT KT KT KT KT D/C       Max Laser lower C/S  4' 4' Subocc 4' sub  occ 4' sub  occ   NV       B/L OA mobs    KT KT D/C       Exercise Diary              Bwd UBE  6' 6' 6' 6' 10' 10' 10' 10' 10'   CCF w/ biofeedback  26 mmHG 10"x10 28 mmHg 10"x10 28  mmHG  10x10" 28  mmHG  10x1" 30mmHg 10"x10 30mmHg 10"x10 30mm  Hg  10"x10 30mmHg 10"x10 30mm  Hg  10x10"   Prone YTI *demo  20x ea 1#x15  ea 1#x20  ea 1# 20x ea 1# 25x ea 1#  25x ea 2# 30x ea 2#  x30ea   Prone scap sq *demo  5"x20 5"x20 5"x20 5"x20 5"x20 5"x20     Posture tband   15x ea grn 15x  ea grn Blue  x30ea 30x ea blue 30x ea blue 30x ea  Blue   25x blue Blue  x30ea   B Horiz Abd   15x grn 15x  grn Blue  15x 20x blue 20x blue 20x  blue 25x blue 25x  blue   D2 flex   15x grn 15x  grn 20x  grn 20x blue 20x blue 20x  blue 25x blue 25x  blue   Lower trap wall slide + lift off   15x grn 15x 15x  grn        C/S retraction + ext  5"x10 5"x10 5"x10 5"x10 5"x5 supine 5"x10 supine 5"x10  supine 5"x10 supine 5"x10  supine   T/S ext over foam roll *demo            Supine tuck into towel w/rotation     5"x5ea 20"x3 ea 20"x3 ea 20"x3  ea 20"x3 ea 20"x3  ea   Self SOR w/tennis balls     2'        Bilat flex/ext body blade      1'x2 15"x4 15"x4 30"x4 30"x4 Modalities

## 2020-04-13 ENCOUNTER — TELEPHONE (OUTPATIENT)
Dept: PSYCHIATRY | Facility: CLINIC | Age: 25
End: 2020-04-13

## 2020-04-21 ENCOUNTER — TELEMEDICINE (OUTPATIENT)
Dept: PSYCHIATRY | Facility: CLINIC | Age: 25
End: 2020-04-21
Payer: COMMERCIAL

## 2020-04-21 VITALS — WEIGHT: 125 LBS | BODY MASS INDEX: 18.51 KG/M2 | HEIGHT: 69 IN

## 2020-04-21 DIAGNOSIS — F41.0 PANIC DISORDER WITHOUT AGORAPHOBIA: ICD-10-CM

## 2020-04-21 DIAGNOSIS — F41.1 GAD (GENERALIZED ANXIETY DISORDER): Chronic | ICD-10-CM

## 2020-04-21 DIAGNOSIS — R00.2 PALPITATIONS: ICD-10-CM

## 2020-04-21 DIAGNOSIS — Z79.899 LONG-TERM USE OF HIGH-RISK MEDICATION: Chronic | ICD-10-CM

## 2020-04-21 DIAGNOSIS — G47.09 OTHER INSOMNIA: Chronic | ICD-10-CM

## 2020-04-21 DIAGNOSIS — F41.1 GENERALIZED ANXIETY DISORDER: ICD-10-CM

## 2020-04-21 DIAGNOSIS — F33.42 MAJOR DEPRESSIVE DISORDER, RECURRENT EPISODE, IN FULL REMISSION (HCC): Primary | Chronic | ICD-10-CM

## 2020-04-21 PROCEDURE — 99213 OFFICE O/P EST LOW 20 MIN: CPT | Performed by: PSYCHIATRY & NEUROLOGY

## 2020-04-21 RX ORDER — QUETIAPINE FUMARATE 50 MG/1
100 TABLET, FILM COATED ORAL
Qty: 60 TABLET | Refills: 2 | Status: SHIPPED | OUTPATIENT
Start: 2020-04-21 | End: 2020-05-29 | Stop reason: SDUPTHER

## 2020-04-21 RX ORDER — CLONAZEPAM 0.5 MG/1
0.5 TABLET ORAL 2 TIMES DAILY PRN
Qty: 60 TABLET | Refills: 2 | Status: SHIPPED | OUTPATIENT
Start: 2020-04-21 | End: 2020-05-29 | Stop reason: SDUPTHER

## 2020-04-21 RX ORDER — SODIUM FLUORIDE 1.1 G/100G
GEL, DENTIFRICE ORAL
COMMUNITY
Start: 2020-04-01 | End: 2021-05-07 | Stop reason: ALTCHOICE

## 2020-04-21 RX ORDER — HYDROXYZINE HYDROCHLORIDE 25 MG/1
25 TABLET, FILM COATED ORAL 3 TIMES DAILY PRN
Qty: 90 TABLET | Refills: 2 | Status: SHIPPED | OUTPATIENT
Start: 2020-04-21 | End: 2020-05-29 | Stop reason: SDUPTHER

## 2020-04-21 RX ORDER — DULOXETIN HYDROCHLORIDE 60 MG/1
60 CAPSULE, DELAYED RELEASE ORAL DAILY
Qty: 30 CAPSULE | Refills: 2 | Status: SHIPPED | OUTPATIENT
Start: 2020-04-21 | End: 2020-05-29 | Stop reason: SDUPTHER

## 2020-04-21 RX ORDER — FEXOFENADINE HCL 180 MG/1
180 TABLET ORAL DAILY
COMMUNITY

## 2020-04-21 RX ORDER — OMEPRAZOLE 20 MG/1
CAPSULE, DELAYED RELEASE ORAL
COMMUNITY
Start: 2020-03-19 | End: 2020-05-27 | Stop reason: SDUPTHER

## 2020-04-24 ENCOUNTER — TELEPHONE (OUTPATIENT)
Dept: BEHAVIORAL/MENTAL HEALTH CLINIC | Facility: CLINIC | Age: 25
End: 2020-04-24

## 2020-04-27 ENCOUNTER — TELEPHONE (OUTPATIENT)
Dept: PSYCHIATRY | Facility: CLINIC | Age: 25
End: 2020-04-27

## 2020-05-12 ENCOUNTER — TRANSCRIBE ORDERS (OUTPATIENT)
Dept: LAB | Facility: CLINIC | Age: 25
End: 2020-05-12

## 2020-05-12 ENCOUNTER — LAB (OUTPATIENT)
Dept: LAB | Facility: CLINIC | Age: 25
End: 2020-05-12
Payer: COMMERCIAL

## 2020-05-12 DIAGNOSIS — F33.42 MAJOR DEPRESSIVE DISORDER, RECURRENT EPISODE, IN FULL REMISSION (HCC): ICD-10-CM

## 2020-05-12 DIAGNOSIS — M79.10 MYALGIA: Primary | ICD-10-CM

## 2020-05-12 DIAGNOSIS — Z79.899 LONG-TERM USE OF HIGH-RISK MEDICATION: ICD-10-CM

## 2020-05-12 DIAGNOSIS — M79.10 MYALGIA: ICD-10-CM

## 2020-05-12 DIAGNOSIS — F41.1 GENERALIZED ANXIETY DISORDER: ICD-10-CM

## 2020-05-12 DIAGNOSIS — R00.2 PALPITATIONS: ICD-10-CM

## 2020-05-12 LAB
ALBUMIN SERPL BCP-MCNC: 3.9 G/DL (ref 3.5–5)
ALP SERPL-CCNC: 43 U/L (ref 46–116)
ALT SERPL W P-5'-P-CCNC: 14 U/L (ref 12–78)
ANION GAP SERPL CALCULATED.3IONS-SCNC: 3 MMOL/L (ref 4–13)
AST SERPL W P-5'-P-CCNC: 9 U/L (ref 5–45)
BASOPHILS # BLD AUTO: 0.04 THOUSANDS/ΜL (ref 0–0.1)
BASOPHILS NFR BLD AUTO: 1 % (ref 0–1)
BILIRUB SERPL-MCNC: 0.32 MG/DL (ref 0.2–1)
BUN SERPL-MCNC: 11 MG/DL (ref 5–25)
CALCIUM SERPL-MCNC: 9.1 MG/DL (ref 8.3–10.1)
CHLORIDE SERPL-SCNC: 107 MMOL/L (ref 100–108)
CHOLEST SERPL-MCNC: 172 MG/DL (ref 50–200)
CO2 SERPL-SCNC: 27 MMOL/L (ref 21–32)
CREAT SERPL-MCNC: 0.76 MG/DL (ref 0.6–1.3)
CRP SERPL QL: 7.1 MG/L
EOSINOPHIL # BLD AUTO: 0.17 THOUSAND/ΜL (ref 0–0.61)
EOSINOPHIL NFR BLD AUTO: 3 % (ref 0–6)
ERYTHROCYTE [DISTWIDTH] IN BLOOD BY AUTOMATED COUNT: 13.7 % (ref 11.6–15.1)
ERYTHROCYTE [SEDIMENTATION RATE] IN BLOOD: 7 MM/HOUR (ref 0–20)
EST. AVERAGE GLUCOSE BLD GHB EST-MCNC: 105 MG/DL
GFR SERPL CREATININE-BSD FRML MDRD: 110 ML/MIN/1.73SQ M
GLUCOSE P FAST SERPL-MCNC: 75 MG/DL (ref 65–99)
HBA1C MFR BLD: 5.3 %
HCG SERPL QL: NEGATIVE
HCT VFR BLD AUTO: 37.4 % (ref 34.8–46.1)
HDLC SERPL-MCNC: 42 MG/DL
HGB BLD-MCNC: 12.4 G/DL (ref 11.5–15.4)
IMM GRANULOCYTES # BLD AUTO: 0.01 THOUSAND/UL (ref 0–0.2)
IMM GRANULOCYTES NFR BLD AUTO: 0 % (ref 0–2)
LDLC SERPL CALC-MCNC: 104 MG/DL (ref 0–100)
LYMPHOCYTES # BLD AUTO: 2.3 THOUSANDS/ΜL (ref 0.6–4.47)
LYMPHOCYTES NFR BLD AUTO: 43 % (ref 14–44)
MCH RBC QN AUTO: 28.2 PG (ref 26.8–34.3)
MCHC RBC AUTO-ENTMCNC: 33.2 G/DL (ref 31.4–37.4)
MCV RBC AUTO: 85 FL (ref 82–98)
MONOCYTES # BLD AUTO: 0.35 THOUSAND/ΜL (ref 0.17–1.22)
MONOCYTES NFR BLD AUTO: 7 % (ref 4–12)
NEUTROPHILS # BLD AUTO: 2.53 THOUSANDS/ΜL (ref 1.85–7.62)
NEUTS SEG NFR BLD AUTO: 46 % (ref 43–75)
NONHDLC SERPL-MCNC: 130 MG/DL
NRBC BLD AUTO-RTO: 0 /100 WBCS
PLATELET # BLD AUTO: 245 THOUSANDS/UL (ref 149–390)
PMV BLD AUTO: 10.2 FL (ref 8.9–12.7)
POTASSIUM SERPL-SCNC: 3.9 MMOL/L (ref 3.5–5.3)
PROT SERPL-MCNC: 7.5 G/DL (ref 6.4–8.2)
RBC # BLD AUTO: 4.39 MILLION/UL (ref 3.81–5.12)
SODIUM SERPL-SCNC: 137 MMOL/L (ref 136–145)
T4 FREE SERPL-MCNC: 0.92 NG/DL (ref 0.76–1.46)
TRIGL SERPL-MCNC: 129 MG/DL
TSH 30M P TRH SERPL-ACNC: 4.59 UIU/ML
TSH SERPL DL<=0.05 MIU/L-ACNC: 4.51 UIU/ML (ref 0.36–3.74)
WBC # BLD AUTO: 5.4 THOUSAND/UL (ref 4.31–10.16)

## 2020-05-12 PROCEDURE — 83036 HEMOGLOBIN GLYCOSYLATED A1C: CPT

## 2020-05-12 PROCEDURE — 85652 RBC SED RATE AUTOMATED: CPT

## 2020-05-12 PROCEDURE — 36415 COLL VENOUS BLD VENIPUNCTURE: CPT

## 2020-05-12 PROCEDURE — 80061 LIPID PANEL: CPT

## 2020-05-12 PROCEDURE — 84443 ASSAY THYROID STIM HORMONE: CPT

## 2020-05-12 PROCEDURE — 85025 COMPLETE CBC W/AUTO DIFF WBC: CPT

## 2020-05-12 PROCEDURE — 84703 CHORIONIC GONADOTROPIN ASSAY: CPT

## 2020-05-12 PROCEDURE — 80053 COMPREHEN METABOLIC PANEL: CPT

## 2020-05-12 PROCEDURE — 84439 ASSAY OF FREE THYROXINE: CPT

## 2020-05-12 PROCEDURE — 86140 C-REACTIVE PROTEIN: CPT

## 2020-05-13 ENCOUNTER — ANNUAL EXAM (OUTPATIENT)
Dept: OBGYN CLINIC | Facility: MEDICAL CENTER | Age: 25
End: 2020-05-13
Payer: COMMERCIAL

## 2020-05-13 VITALS — BODY MASS INDEX: 18.58 KG/M2 | SYSTOLIC BLOOD PRESSURE: 110 MMHG | WEIGHT: 125.8 LBS | DIASTOLIC BLOOD PRESSURE: 60 MMHG

## 2020-05-13 DIAGNOSIS — Z01.419 ENCOUNTER FOR WELL WOMAN EXAM WITH ROUTINE GYNECOLOGICAL EXAM: Primary | ICD-10-CM

## 2020-05-13 DIAGNOSIS — Z30.41 ENCOUNTER FOR SURVEILLANCE OF CONTRACEPTIVE PILLS: ICD-10-CM

## 2020-05-13 PROCEDURE — 99395 PREV VISIT EST AGE 18-39: CPT | Performed by: OBSTETRICS & GYNECOLOGY

## 2020-05-13 PROCEDURE — G0145 SCR C/V CYTO,THINLAYER,RESCR: HCPCS | Performed by: OBSTETRICS & GYNECOLOGY

## 2020-05-13 RX ORDER — NORETHINDRONE ACETATE AND ETHINYL ESTRADIOL 1; .02 MG/1; MG/1
1 TABLET ORAL DAILY
Qty: 28 TABLET | Refills: 11 | Status: SHIPPED | OUTPATIENT
Start: 2020-05-13 | End: 2021-09-27

## 2020-05-21 LAB
LAB AP GYN PRIMARY INTERPRETATION: NORMAL
Lab: NORMAL

## 2020-05-27 ENCOUNTER — OFFICE VISIT (OUTPATIENT)
Dept: FAMILY MEDICINE CLINIC | Facility: CLINIC | Age: 25
End: 2020-05-27
Payer: COMMERCIAL

## 2020-05-27 VITALS
HEART RATE: 88 BPM | HEIGHT: 69 IN | TEMPERATURE: 98.9 F | WEIGHT: 127 LBS | DIASTOLIC BLOOD PRESSURE: 80 MMHG | BODY MASS INDEX: 18.81 KG/M2 | SYSTOLIC BLOOD PRESSURE: 110 MMHG

## 2020-05-27 DIAGNOSIS — E03.9 HYPOTHYROIDISM, UNSPECIFIED TYPE: ICD-10-CM

## 2020-05-27 DIAGNOSIS — F41.1 GAD (GENERALIZED ANXIETY DISORDER): Primary | Chronic | ICD-10-CM

## 2020-05-27 DIAGNOSIS — R25.2 MUSCLE CRAMPS: ICD-10-CM

## 2020-05-27 DIAGNOSIS — E78.00 ELEVATED LDL CHOLESTEROL LEVEL: ICD-10-CM

## 2020-05-27 DIAGNOSIS — K21.9 GASTROESOPHAGEAL REFLUX DISEASE, ESOPHAGITIS PRESENCE NOT SPECIFIED: ICD-10-CM

## 2020-05-27 PROCEDURE — 3008F BODY MASS INDEX DOCD: CPT | Performed by: FAMILY MEDICINE

## 2020-05-27 PROCEDURE — 99214 OFFICE O/P EST MOD 30 MIN: CPT | Performed by: FAMILY MEDICINE

## 2020-05-27 PROCEDURE — 1036F TOBACCO NON-USER: CPT | Performed by: FAMILY MEDICINE

## 2020-05-27 RX ORDER — OMEPRAZOLE 20 MG/1
40 CAPSULE, DELAYED RELEASE ORAL DAILY
Qty: 60 CAPSULE | Refills: 5 | Status: SHIPPED | OUTPATIENT
Start: 2020-05-27 | End: 2020-12-31 | Stop reason: SDUPTHER

## 2020-05-27 RX ORDER — TIZANIDINE 2 MG/1
2 TABLET ORAL
Qty: 30 TABLET | Refills: 0 | Status: SHIPPED | OUTPATIENT
Start: 2020-05-27 | End: 2020-07-07 | Stop reason: SDUPTHER

## 2020-05-27 RX ORDER — ADAPALENE AND BENZOYL PEROXIDE 3; 25 MG/G; MG/G
GEL TOPICAL
COMMUNITY
Start: 2020-05-18 | End: 2021-12-29 | Stop reason: SDUPTHER

## 2020-05-29 ENCOUNTER — TELEMEDICINE (OUTPATIENT)
Dept: PSYCHIATRY | Facility: CLINIC | Age: 25
End: 2020-05-29
Payer: COMMERCIAL

## 2020-05-29 VITALS — WEIGHT: 125 LBS | HEIGHT: 69 IN | BODY MASS INDEX: 18.51 KG/M2

## 2020-05-29 DIAGNOSIS — F41.1 GAD (GENERALIZED ANXIETY DISORDER): Chronic | ICD-10-CM

## 2020-05-29 DIAGNOSIS — F41.0 PANIC DISORDER WITHOUT AGORAPHOBIA: Chronic | ICD-10-CM

## 2020-05-29 DIAGNOSIS — F33.42 MAJOR DEPRESSIVE DISORDER, RECURRENT EPISODE, IN FULL REMISSION (HCC): Primary | Chronic | ICD-10-CM

## 2020-05-29 DIAGNOSIS — G47.09 OTHER INSOMNIA: Chronic | ICD-10-CM

## 2020-05-29 PROCEDURE — 90833 PSYTX W PT W E/M 30 MIN: CPT | Performed by: PSYCHIATRY & NEUROLOGY

## 2020-05-29 PROCEDURE — 99213 OFFICE O/P EST LOW 20 MIN: CPT | Performed by: PSYCHIATRY & NEUROLOGY

## 2020-05-29 PROCEDURE — 3008F BODY MASS INDEX DOCD: CPT | Performed by: STUDENT IN AN ORGANIZED HEALTH CARE EDUCATION/TRAINING PROGRAM

## 2020-05-29 RX ORDER — QUETIAPINE FUMARATE 50 MG/1
50 TABLET, FILM COATED ORAL
Qty: 30 TABLET | Refills: 3 | Status: SHIPPED | OUTPATIENT
Start: 2020-05-29 | End: 2020-10-06 | Stop reason: SDDI

## 2020-05-29 RX ORDER — DULOXETIN HYDROCHLORIDE 60 MG/1
60 CAPSULE, DELAYED RELEASE ORAL DAILY
Qty: 30 CAPSULE | Refills: 3 | Status: SHIPPED | OUTPATIENT
Start: 2020-05-29 | End: 2020-10-06 | Stop reason: SDUPTHER

## 2020-05-29 RX ORDER — HYDROXYZINE HYDROCHLORIDE 25 MG/1
25 TABLET, FILM COATED ORAL 3 TIMES DAILY PRN
Qty: 90 TABLET | Refills: 3 | Status: SHIPPED | OUTPATIENT
Start: 2020-05-29 | End: 2020-10-06 | Stop reason: SDUPTHER

## 2020-05-29 RX ORDER — CLONAZEPAM 0.5 MG/1
0.5 TABLET ORAL 2 TIMES DAILY PRN
Qty: 60 TABLET | Refills: 2 | Status: SHIPPED | OUTPATIENT
Start: 2020-07-20 | End: 2020-10-06 | Stop reason: SDUPTHER

## 2020-05-29 RX ORDER — LANOLIN ALCOHOL/MO/W.PET/CERES
3 CREAM (GRAM) TOPICAL
Qty: 30 TABLET | Refills: 3 | Status: SHIPPED | OUTPATIENT
Start: 2020-05-29 | End: 2020-09-26

## 2020-07-07 DIAGNOSIS — R25.2 MUSCLE CRAMPS: ICD-10-CM

## 2020-07-07 RX ORDER — TIZANIDINE 2 MG/1
2 TABLET ORAL
Qty: 30 TABLET | Refills: 0 | Status: SHIPPED | OUTPATIENT
Start: 2020-07-07 | End: 2020-07-29

## 2020-07-14 ENCOUNTER — TELEMEDICINE (OUTPATIENT)
Dept: DERMATOLOGY | Facility: CLINIC | Age: 25
End: 2020-07-14
Payer: COMMERCIAL

## 2020-07-14 DIAGNOSIS — L70.0 ACNE VULGARIS: Primary | ICD-10-CM

## 2020-07-14 DIAGNOSIS — L73.0 ACNE SCARRING: ICD-10-CM

## 2020-07-14 PROCEDURE — 99203 OFFICE O/P NEW LOW 30 MIN: CPT | Performed by: STUDENT IN AN ORGANIZED HEALTH CARE EDUCATION/TRAINING PROGRAM

## 2020-07-14 NOTE — PROGRESS NOTES
Virtual Regular Visit      Assessment/Plan:    Problem List Items Addressed This Visit     None               Reason for visit is   Chief Complaint   Patient presents with    Virtual Regular Visit        Encounter provider Anali Alonso MD    Provider located at [address] Patricia [address] [phone]      Recent Visits  No visits were found meeting these conditions  Showing recent visits within past 7 days and meeting all other requirements     Future Appointments  No visits were found meeting these conditions  Showing future appointments within next 150 days and meeting all other requirements        The patient was identified by name and date of birth  Jeanette Villanueva was informed that this is a telemedicine visit and that the visit is being conducted through [employer]  My office door was closed  The following individuals were in the room with me and the patient informed John Brand  She acknowledged consent and understanding of privacy and security of the video platform  The patient has agreed to participate and understands they can discontinue the visit at any time  Patient is aware this is a billable service  Subjective  Jeanette Villanueva is a 25 y o  female Please see note below         HPI     Past Medical History:   Diagnosis Date    Allergic     Allergic rhinitis     Anxiety     Aphthous ulcer     Chronic back pain     Eczema     Fibromyalgia     Fracture of pelvis ([facility] [address] )     Last Assessed:  3/1/17    GERD (gastroesophageal reflux disease)     Idiopathic hypotension     Jaw fracture (HCC)     Last Assessed:  3/1/17    Nystagmus     Pelvic fracture (HCC)     TMJ (dislocation of temporomandibular joint)     Vitamin B12 deficiency     Vitamin D deficiency        Past Surgical History:   Procedure Laterality Date    SKIN BIOPSY      WISDOM TOOTH EXTRACTION         Current Outpatient Medications   Medication Sig Dispense Refill    Cholecalciferol (VITAMIN D-3) 5000 units TABS Take 1 tablet daily 1 tablet 0    [START ON 7/20/2020] clonazePAM (KlonoPIN) 0 5 mg tablet Take 1 tablet (0 5 mg total) by mouth 2 (two) times a day as needed for anxiety To be filled on or after 7/20/20 60 tablet 2    DENTA 5000 PLUS 1 1 % CREA USE A PEA SIZE AMOUNT AT BEDTIME  DO NOT RINSE,EAT,OR DRINK FOR 30 MINUTES      dicyclomine (BENTYL) 10 mg capsule Take 1 capsule (10 mg total) by mouth every 8 (eight) hours as needed (Abdominal pain) 20 capsule 0    DULoxetine (CYMBALTA) 60 mg delayed release capsule Take 1 capsule (60 mg total) by mouth daily 30 capsule 3    EPIDUO FORTE 0 3-2 5 % GEL APPLY PEA SIZED AMOUNT TO ENTIRE FACE EVERY EVENING      famotidine (PEPCID) 10 mg tablet Take 10 mg by mouth 2 (two) times a day as needed       fexofenadine (ALLEGRA) 180 MG tablet Take 180 mg by mouth daily      hydrOXYzine HCL (ATARAX) 25 mg tablet Take 1 tablet (25 mg total) by mouth 3 (three) times a day as needed for anxiety 90 tablet 3    magnesium oxide (MAG-OX) 400 mg Take 400 mg by mouth as needed       melatonin 3 mg Take 1 tablet (3 mg total) by mouth daily at bedtime as needed (insomnia) 30 tablet 3    Multiple Vitamins-Minerals (HAIR SKIN AND NAILS FORMULA PO) Take by mouth      norethindrone-ethinyl estradiol (MICROGESTIN 1/20) 1-20 MG-MCG per tablet Take 1 tablet by mouth daily 28 tablet 11    omeprazole (PriLOSEC) 20 mg delayed release capsule Take 2 capsules (40 mg total) by mouth daily 60 capsule 5    QUEtiapine (SEROquel) 50 mg tablet Take 1 tablet (50 mg total) by mouth daily at bedtime 30 tablet 3    tiZANidine (ZANAFLEX) 2 mg tablet Take 1 tablet (2 mg total) by mouth daily at bedtime 30 tablet 0    triamcinolone (KENALOG) 0 1 % cream APPLY SMALL AMOUNT TWICE A DAY X 2 WEEKS TO THE B/L HANDS AND ELBOW THEN AS NEEDED FOR FLARES  0    vitamin B-12 (VITAMIN B-12) 1,000 mcg tablet Take 5,000 mcg by mouth daily       No current facility-administered medications for this visit  Allergies   Allergen Reactions    Lexapro [Escitalopram]     Robaxin [Methocarbamol] GI Intolerance       Review of Systems    Video Exam    There were no vitals filed for this visit  Physical Exam     I spent 20 minutes directly with the patient during this visit      VIRTUAL VISIT DISCLAIMER    Gely Arboleda acknowledges that she has consented to an online visit or consultation  She understands that the online visit is based solely on information provided by her, and that, in the absence of a face-to-face physical evaluation by the physician, the diagnosis she receives is both limited and provisional in terms of accuracy and completeness  This is not intended to replace a full medical face-to-face evaluation by the physician  Gely Arboleda understands and accepts these terms  [facility] Note     Patient Name: Gely Arboleda  Encounter Date: 07/14/2020       Have you been cared for by a [facility] D[facility]atologist in the last 3 years and, if so, which one? No    · Have you traveled outside of the [address] [place] [address] in the past 3 months or outside of the [facility] in the last 2 weeks? No     May we call your Preferred Phone number to discuss your specific medical information? Yes     May we leave a detailed message that includes your specific medical information? Yes      Today's Chief Concerns:   Concern #1: Acne      Past Medical History:  Have you personally ever had or currently have any of the following?     · Skin cancer (such as Melanoma, Basal Cell Carcinoma, Squamous Cell Carcinoma? (If Yes, please provide more detail)- No  · Eczema: YES  · Psoriasis: No  · HIV/AIDS: No  · Hepatitis B or C: No  · Tuberculosis: No  · Systemic Immunosuppression such as Diabetes, Biologic or Immunotherapy, Chemotherapy, Organ Transplantation, Bone Marrow Transplantation (If YES, please provide more detail): No  · Radiation Treatment (If YES, please provide more detail): No  · Any other major medical conditions/concerns? 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 Exercising, yoga horse back riding     Family History:  Have any of your "first degree relatives" (parent, brother, sister, or child) had any of the following       · Skin cancer such as Melanoma or Merkel Cell Carcinoma or Pancreatic Cancer? No  · Eczema, Asthma, Hay Fever or Seasonal Allergies: YES, Bother- Asthma   · Psoriasis or Psoriatic Arthritis: No  · Do any other medical conditions seem to run in your family? If Yes, what condition and which relatives? YES, Mother- Heart Disease,  due to heart attack     Current Medications:   (please update all dermatological medications before printing patient's AVS!)      Current Outpatient Medications:     Cholecalciferol (VITAMIN D-3) 5000 units TABS, Take 1 tablet daily, Disp: 1 tablet, Rfl: 0    [START ON 2020] clonazePAM (KlonoPIN) 0 5 mg tablet, Take 1 tablet (0 5 mg total) by mouth 2 (two) times a day as needed for anxiety To be filled on or after 20, Disp: 60 tablet, Rfl: 2    DENTA 5000 PLUS 1 1 % CREA, USE A PEA SIZE AMOUNT AT BEDTIME  DO NOT RINSE,EAT,OR DRINK FOR 30 MINUTES, Disp: , Rfl:     dicyclomine (BENTYL) 10 mg capsule, Take 1 capsule (10 mg total) by mouth every 8 (eight) hours as needed (Abdominal pain), Disp: 20 capsule, Rfl: 0    DULoxetine (CYMBALTA) 60 mg delayed release capsule, Take 1 capsule (60 mg total) by mouth daily, Disp: 30 capsule, Rfl: 3    EPIDUO FORTE 0 3-2 5 % GEL, APPLY PEA SIZED AMOUNT TO ENTIRE FACE EVERY EVENING, Disp: , Rfl:     famotidine (PEPCID) 10 mg tablet, Take 10 mg by mouth 2 (two) times a day as needed , Disp: , Rfl:     fexofenadine (ALLEGRA) 180 MG tablet, Take 180 mg by mouth daily, Disp: , Rfl:     hydrOXYzine HCL (ATARAX) 25 mg tablet, Take 1 tablet (25 mg total) by mouth 3 (three) times a day as needed for anxiety, Disp: 90 tablet, Rfl: 3    magnesium oxide (MAG-OX) 400 mg, Take 400 mg by mouth as needed , Disp: , Rfl:     melatonin 3 mg, Take 1 tablet (3 mg total) by mouth daily at bedtime as needed (insomnia), Disp: 30 tablet, Rfl: 3    Multiple Vitamins-Minerals (HAIR SKIN AND NAILS FORMULA PO), Take by mouth, Disp: , Rfl:     norethindrone-ethinyl estradiol (MICROGESTIN 1/20) 1-20 MG-MCG per tablet, Take 1 tablet by mouth daily, Disp: 28 tablet, Rfl: 11    omeprazole (PriLOSEC) 20 mg delayed release capsule, Take 2 capsules (40 mg total) by mouth daily, Disp: 60 capsule, Rfl: 5    QUEtiapine (SEROquel) 50 mg tablet, Take 1 tablet (50 mg total) by mouth daily at bedtime, Disp: 30 tablet, Rfl: 3    tiZANidine (ZANAFLEX) 2 mg tablet, Take 1 tablet (2 mg total) by mouth daily at bedtime, Disp: 30 tablet, Rfl: 0    triamcinolone (KENALOG) 0 1 % cream, APPLY SMALL AMOUNT TWICE A DAY X 2 WEEKS TO THE B/L HANDS AND ELBOW THEN AS NEEDED FOR FLARES, Disp: , Rfl: 0    vitamin B-12 (VITAMIN B-12) 1,000 mcg tablet, Take 5,000 mcg by mouth daily, Disp: , Rfl:       Review of Systems:  Have you recently had or currently have any of the following? If YES, what are you doing for the problem? · Fever, chills or unintended weight loss: No  · Sudden loss or change in your vision: No  · Nausea, vomiting or blood in your stool: No  · Painful or swollen joints: No  · Wheezing or cough: No  · Changing mole or non-healing wound: No  · Nosebleeds: No  · Excessive sweating: No  · Easy or prolonged bleeding? No  · Over the last 2 weeks, how often have you been bothered by the following problems?   · Taking little interest or pleasure in doing things: 1 - Not at All  · Feeling down, depressed, or hopeless: 1 - Not at All  · Rapid heartbeat with epinephrine:  No    · FEMALES ONLY:    · Are you pregnant or planning to become pregnant? No  · Are you currently or planning to be nursing or breast feeding? No    · Any known allergies?      Allergies   Allergen Reactions    Lexapro [Escitalopram]     Robaxin [Methocarbamol] GI Intolerance   ·       Physical Exam:     Was a chaperone (Derm Clinical Assistant) present throughout the entire virtual Physical Exam? Yes     Did the Dermatology Team specifically  the patient on the technical and practical limitations of a virtual Physical Exam? Yes}  o Did the patient ultimately request or accept a virtual Physical Exam?  Yes  o Did the patient specifically refuse to have the areas "under-the-bra" examined by the Dermatologist? No  o Did the patient specifically refuse to have the areas "under-the-underwear" examined by the Dermatologist? No    CONSTITUTIONAL:   Appearance: alert, well appearing, and in no distress  PSYCH: Normal mood and affect  EYES: Normal appearing eyes with normal color; no obvious deformities  ENT: Normal ears, nose, lips and neck with normal color and no obvious deformities; no obvious difficulty swallowing  CARDIOVASCULAR: No obvious edema; no obvious jugular venous distension  RESPIRATORY: Normal appearing respirations; no obvious shortness of breath  HEME/LYMPH/IMMUNO:  Normal color without obvious pallor, jaundice, petechiae or bleeding; no obvious cervical chain masses    SKIN:  FULL ORGAN SYSTEM EXAM  Face Normal except as noted below in Assessment   Neck Normal except as noted below in Assessment   Right Arm/Hand/Fingers    Left Arm/Hand/Fingers    Chest/Breasts/Axillae    Abdomen, Umbilicus    Back/Spine    Groin/Genitalia/Buttocks    Right Leg, Foot, Toes    Left Leg, Foot, Toes         Assessment and Plan by Diagnosis:    History of Present Condition:     Duration:  How long has this been an issue for you?    o  Years   Location Affected:  Where on the body is this affecting you?    o  Face, back   Quality:  Is there any bleeding, pain, itch, burning/irritation, or redness associated with the skin lesion?    o  Denies   Severity:  Describe any bleeding, pain, itch, burning/irritation, or redness on a scale of 1 to 10 (with 10 being the worst)  o  0    Timing:  Does this condition seem to be there pretty constantly or do you notice it more at specific times throughout the day?    o  Comes and goes   Context:  Have you ever noticed that this condition seems to be associated with specific activities you do?    o  Unknown    Modifying Factors:    o Anything that seems to make the condition worse?    -  Not washing her face   o What have you tried to do to make the condition better?    -  has completed accutane a full course and a half  - Epiduo Forte 0 3-2 5 % Gel  - Micro needle- once    Associated Signs and Symptoms:  Does this skin lesion seem to be associated with any of the following:  o  SL AMB DERM SIGNS AND SYMPTOMS: Scarring     ACNE VULGARIS ("COMMON ACNE")    Physical Exam:   Psychiatric/Mood:   Anatomic Location Affected: Face   Morphological Description:  o Open/Closed Comedones:  - No evidence ("Clear")  o Inflammatory Papules/Pustules:  - No evidence ("Clear")  o Nodules:  - No evidence ("Clear")  o Scarring:  - Several ("Moderate")  o Excoriations:  - No evidence ("Clear")  o Local Skin Redness/Erythema:  - No evidence ("Clear")  o Local Skin Dryness/Scaling:  - No evidence ("Clear")  o Local Skin Dyspigmentation:  - No evidence ("Clear")   Pertinent Positives:   Pertinent Negatives: Additional History of Present Condition:  Patient is present via virtual visit to establish care with a new dermatologist due to insurance plan changes  Patient has had issues with acne since high school  Has completed a course and a half of Accutane  She is currently only on Epiduo Forte 0 3-2 5 % Gel  At her old dermatologist she also received a micro needle treatment for her acne scars       Assessment and Plan:   We reviewed the causes of acne, the kinds of acne, and the expected clinical course   We discussed treatment options ranging from over-the-counter products, topical retinoids, antibiotics, BP, hormonal therapies (OCPs/spironolactone), and isotretinoin (Accutane)   We reviewed specific over-the-counter interventions and medications  Recommended typical hygiene measures including water-based facial products, washing regularly with mild cleanser, and refraining from picking and popping any pimples   Recommended non-comedogenic sunscreen use daily   Expectations of therapy discussed  Side effects, risks and benefits of medications discussed   A comprehensive handout on Acne was provided   The phone number to call in case of questions or concerns (and instructions to stop medications in such a scenario) was provided   After lengthy discussion of etiology and treatment options, we decided to implement the following personalized treatment plan:   Discussed Laser treatment consultation on next follow up   Follow up 2 months     Based on a thorough discussion of this condition and the management approach to it (including a comprehensive discussion of the known risks, side effects and potential benefits of treatment), the patient (family) agrees to implement the following specific plan:    --------------------------------------------------------------------------------------  YOUR PERSONALIZED ACNE ACTION PLAN    [address] [facility]    1) SKIN HYGIENE:  In the shower, wash your face, chest and back gently with Cetaphil moisturizing cleanser or Dove Fragrance-free bar  Do not use a luffa or washcloth as these tend to be too irritating to acne-prone skin  2) ANTIMICROBIAL BENZOYL PEROXIDE:   None    3) ANTIBIOTICS:     None    EVENING ROUTINE    1) SKIN HYGIENE:  In the shower, wash your face, chest and back gently with Cetaphil moisturizing cleanser or Dove Fragrance-free bar    Do not use a lufa or washcloth as these tend to be too irritating to acne-prone skin  2) ANTIBIOTICS:     None    Call us if you develop a break out to consider antibiotic treatment temporarily  3) TOPICAL RETINOID:  At 1 hour before bedtime (after washing your face and allowing the skin to completely dry), spread only a single pea-sized amount of this medication evenly over your entire face (avoiding your eyes or mouth):   Continue Epiduo Forte 0 3-2 5 % Gel    4) ORAL CONTRACEPTIVE PILL:   None    5) ORAL ISOTRETINOIN:     None    XEROSIS ("DRY SKIN") + minor component of eczema    Physical Exam:   Anatomic Location Affected: Diffusely   Morphological Description:  Not present today   Pertinent Positives:   Pertinent Negatives: Additional History of Present Condition:  Had for many years    Assessment and Plan:  Based on a thorough discussion of this condition and the management approach to it (including a comprehensive discussion of the known risks, side effects and potential benefits of treatment), the patient (family) agrees to implement the following specific plan:   Moisturize at least 2-3 times a day (oitnment > cream >lotion), susan after shower   Avoid Hot Showers and long showers   Can use Vaseline ointment or options below or any of your preference   Can apply triamcinolone cream prn for 1 week as needed (prescribed by other physician, still has)         Dry skin refers to skin that feels dry to touch  Dry skin has a dull surface with a rough, scaly quality  The skin is less pliable and cracked  When dryness is severe, the skin may become inflamed and fissured  Although any body site can be dry, dry skin tends to affect the shins more than any other site  Dry skin is lacking moisture in the outer horny cell layer (stratum corneum) and this results in cracks in the skin surface  Dry skin is also called xerosis, xeroderma or asteatosis (lack of fat)  It can affect males and females of all ages   There is some racial variability in water and lipid content of the skin   Dry skin that starts in early childhood may be one of about 20 types of ichthyosis (fish-scale skin)  There is often a family history of dry skin   Dry skin is commonly seen in people with atopic dermatitis   Nearly everyone > 60 years has dry skin  Dry skin that begins later may be seen in people with certain diseases and conditions   Postmenopausal women   Hypothyroidism   Chronic renal disease    Malnutrition and weight loss    Subclinical dermatitis    Treatment with certain drugs such as oral retinoids, diuretics and epidermal growth factor receptor inhibitors    People exposed to a dry environment may experience dry skin   Low humidity: in desert climates or cool, windy conditions    Excessive air conditioning    Direct heat from a fire or fan heater    Excessive bathing    Contact with soap, detergents and solvents    Inappropriate topical agents such as alcohol    Frictional irritation from rough clothing or abrasives    Dry skin is due to abnormalities in the integrity of the barrier function of the stratum corneum, which is made up of corneocytes   There is an overall reduction in the lipids in the stratum corneum   Ratio of ceramides, cholesterol and free fatty acids may be normal or altered   There may be a reduction in the proliferation of keratinocytes   Keratinocyte subtypes change in dry skin with a decrease in keratins K1, K10 and increase in K5, K14     Involucrin (a protein) may be expressed early, increasing cell stiffness   The result is retention of corneocytes and reduced water-holding capacity  The inherited forms of ichthyosis are due to loss of function mutations in various genes (listed in parentheses below)  The clinical features of ichthyosis depend on the specific type of ichthyosis:   Ichthyosis vulgaris (FLG)      Recessive X-linked ichthyosis (STS)    Autosomal recessive congenital ichthyosis (ABCA12, TGM1, ALOXE3)    Keratinopathic ichthyoses (KRT1, KRT10, KRT2)    Acquired ichthyosis may be due to:   Metabolic factors: thyroid deficiency    Illness: lymphoma, internal malignancy, sarcoidosis, HIV infection    Drugs: nicotinic acid, kava, protein kinase inhibitors (eg EGFR inhibitors), hydroxyurea  Complications of dry skin:  Dry areas of skin may become itchy, indicating a form of eczema/dermatitis has developed   Atopic eczema -- especially in people with ichthyosis vulgaris    Eczema craquelé -- especially in elderly people  Also called asteatotic eczema    A dry form of nummular dermatitis/discoid eczema -- especially in people that wash their skin excessively  When the dry skin of an elderly person is itchy without a visible rash, it is sometimes called winter itch, 7th age itch, senile pruritus or chronic pruritus of the elderly  Other complications of dry skin may include:   Skin infection when bacteria or viruses penetrate a break in the skin surface    Overheating, especially in some forms of ichthyosis    Food allergy, eg, to peanuts, has been associated with filaggrin mutations    Contact allergy, eg, to nickel, has also been correlated with barrier function defects  How is the type of dry skin diagnosed? The type of dry skin is diagnosed by careful history and examination  In children:   Family history    Age of onset    Appearance at birth, if known    Distribution of dry skin    Other features, eg eczema, abnormal nails, hair, dentition, sight, hearing  In adults:   Medical history    Medications and topical preparations    Bathing frequency and use of soap    Evaluation of environmental factors that may contribute to dry skin  What is the treatment for dry skin? The mainstay of treatment of dry skin and ichthyosis is moisturisers/emollients   They should be applied liberally and often enough to:   Reduce itch    Improve the barrier function    Prevent entry of irritants, bacteria    Reduce transepidermal water loss  When considering which emollient is most suitable, consider:   Severity of the dryness    Tolerance    Personal preference    Cost and availability  Emollients generally work best if applied to damp skin, if pH is below 7 (acidic), and if containing humectants such as urea or propylene glycol  Additional treatments include:   Topical steroid if itchy or there is dermatitis -- choose an emollient base    Topical calcineurin inhibitors if topical steroids are unsuitable  How can dry skin be prevented? Eliminate aggravating factors:   Reduce the frequency of bathing   A humidifier in winter and air conditioner in summer    Compare having a short shower with a prolonged soak in a bath   Use lukewarm, not hot, water   Replace standard soap with a substitute such as a synthetic detergent cleanser, water-miscible emollient, bath oil, anti-pruritic tar oil, colloidal oatmeal etc     Apply an emollient liberally and often, particularly shortly after bathing, and when itchy  The drier the skin, the thicker this should be, especially on the hands  What is the outlook for dry skin? A tendency to dry skin may persist life-long, or it may improve once contributing factors are controlled  RASH: Scarring from acne (pitted scars) on face and scar under lip     Physical Exam:   (Anatomic Location); (Size and Morphological Description); (Differential Diagnosis):  o Pitted scars on face and linear scar under lips   Pertinent Positives:   Pertinent Negatives:     Additional History of Present Condition:  Wants treatment    Assessment and Plan:  Based on a thorough discussion of this condition and the management approach to it (including a comprehensive discussion of the known risks, side effects and potential benefits of treatment), the patient (family) agrees to implement the following specific plan:   Return 2 months for consultation for CO2 laser of facial scars                 Scribe Attestation    I,:   Sera Simmons MA am acting as a scribe while in the presence of the attending physician :        I,:   Carlos Martin MD personally performed the services described in this documentation    as scribed in my presence :

## 2020-07-14 NOTE — PATIENT INSTRUCTIONS
Assessment and Plan:   We reviewed the causes of acne, the kinds of acne, and the expected clinical course   We discussed treatment options ranging from over-the-counter products, topical retinoids, antibiotics, BP, hormonal therapies (OCPs/spironolactone), and isotretinoin (Accutane)   We reviewed specific over-the-counter interventions and medications  Recommended typical hygiene measures including water-based facial products, washing regularly with mild cleanser, and refraining from picking and popping any pimples   Recommended non-comedogenic sunscreen use daily   Expectations of therapy discussed  Side effects, risks and benefits of medications discussed   A comprehensive handout on Acne was provided   The phone number to call in case of questions or concerns (and instructions to stop medications in such a scenario) was provided   After lengthy discussion of etiology and treatment options, we decided to implement the following personalized treatment plan:   Discussed Laser treatment consultation on next follow up   Follow up 2 months     Based on a thorough discussion of this condition and the management approach to it (including a comprehensive discussion of the known risks, side effects and potential benefits of treatment), the patient (family) agrees to implement the following specific plan:    --------------------------------------------------------------------------------------  YOUR PERSONALIZED ACNE ACTION PLAN    [address] [facility]    1) SKIN HYGIENE:  In the shower, wash your face, chest and back gently with Cetaphil moisturizing cleanser or Dove Fragrance-free bar  Do not use a luffa or washcloth as these tend to be too irritating to acne-prone skin  2) ANTIMICROBIAL BENZOYL PEROXIDE:   None    3) ANTIBIOTICS:     None    EVENING ROUTINE    1) SKIN HYGIENE:  In the shower, wash your face, chest and back gently with Cetaphil moisturizing cleanser or Dove Fragrance-free bar  Do not use a lufa or washcloth as these tend to be too irritating to acne-prone skin  2) ANTIBIOTICS:     None    Call us if you develop a break out to consider antibiotic treatment temporarily  3) TOPICAL RETINOID:  At 1 hour before bedtime (after washing your face and allowing the skin to completely dry), spread only a single pea-sized amount of this medication evenly over your entire face (avoiding your eyes or mouth):   Continue Epiduo Forte 0 3-2 5 % Gel    4) ORAL CONTRACEPTIVE PILL:   None    5) ORAL ISOTRETINOIN:     None    Assessment and Plan:  Based on a thorough discussion of this condition and the management approach to it (including a comprehensive discussion of the known risks, side effects and potential benefits of treatment), the patient (family) agrees to implement the following specific plan:   Moisturize atleast 2-3 times a day   Avoid Hot Showers   Can use OTC          Dry skin refers to skin that feels dry to touch  Dry skin has a dull surface with a rough, scaly quality  The skin is less pliable and cracked  When dryness is severe, the skin may become inflamed and fissured  Although any body site can be dry, dry skin tends to affect the shins more than any other site  Dry skin is lacking moisture in the outer horny cell layer (stratum corneum) and this results in cracks in the skin surface  Dry skin is also called xerosis, xeroderma or asteatosis (lack of fat)  It can affect males and females of all ages  There is some racial variability in water and lipid content of the skin   Dry skin that starts in early childhood may be one of about 20 types of ichthyosis (fish-scale skin)  There is often a family history of dry skin   Dry skin is commonly seen in people with atopic dermatitis   Nearly everyone > 60 years has dry skin  Dry skin that begins later may be seen in people with certain diseases and conditions     Postmenopausal women   Hypothyroidism   Chronic renal disease    Malnutrition and weight loss    Subclinical dermatitis    Treatment with certain drugs such as oral retinoids, diuretics and epidermal growth factor receptor inhibitors    People exposed to a dry environment may experience dry skin   Low humidity: in desert climates or cool, windy conditions    Excessive air conditioning    Direct heat from a fire or fan heater    Excessive bathing    Contact with soap, detergents and solvents    Inappropriate topical agents such as alcohol    Frictional irritation from rough clothing or abrasives    Dry skin is due to abnormalities in the integrity of the barrier function of the stratum corneum, which is made up of corneocytes   There is an overall reduction in the lipids in the stratum corneum   Ratio of ceramides, cholesterol and free fatty acids may be normal or altered   There may be a reduction in the proliferation of keratinocytes   Keratinocyte subtypes change in dry skin with a decrease in keratins K1, K10 and increase in K5, K14     Involucrin (a protein) may be expressed early, increasing cell stiffness   The result is retention of corneocytes and reduced water-holding capacity  The inherited forms of ichthyosis are due to loss of function mutations in various genes (listed in parentheses below)  The clinical features of ichthyosis depend on the specific type of ichthyosis:   Ichthyosis vulgaris (FLG)   Recessive X-linked ichthyosis (STS)    Autosomal recessive congenital ichthyosis (ABCA12, TGM1, ALOXE3)    Keratinopathic ichthyoses (KRT1, KRT10, KRT2)    Acquired ichthyosis may be due to:   Metabolic factors: thyroid deficiency    Illness: lymphoma, internal malignancy, sarcoidosis, HIV infection    Drugs: nicotinic acid, kava, protein kinase inhibitors (eg EGFR inhibitors), hydroxyurea      Complications of dry skin:  Dry areas of skin may become itchy, indicating a form of eczema/dermatitis has developed   Atopic eczema -- especially in people with ichthyosis vulgaris    Eczema craquelé -- especially in elderly people  Also called asteatotic eczema    A dry form of nummular dermatitis/discoid eczema -- especially in people that wash their skin excessively  When the dry skin of an elderly person is itchy without a visible rash, it is sometimes called winter itch, 7th age itch, senile pruritus or chronic pruritus of the elderly  Other complications of dry skin may include:   Skin infection when bacteria or viruses penetrate a break in the skin surface    Overheating, especially in some forms of ichthyosis    Food allergy, eg, to peanuts, has been associated with filaggrin mutations    Contact allergy, eg, to nickel, has also been correlated with barrier function defects  How is the type of dry skin diagnosed? The type of dry skin is diagnosed by careful history and examination  In children:   Family history    Age of onset    Appearance at birth, if known    Distribution of dry skin    Other features, eg eczema, abnormal nails, hair, dentition, sight, hearing  In adults:   Medical history    Medications and topical preparations    Bathing frequency and use of soap    Evaluation of environmental factors that may contribute to dry skin  What is the treatment for dry skin? The mainstay of treatment of dry skin and ichthyosis is moisturisers/emollients  They should be applied liberally and often enough to:   Reduce itch    Improve the barrier function    Prevent entry of irritants, bacteria    Reduce transepidermal water loss  When considering which emollient is most suitable, consider:   Severity of the dryness    Tolerance    Personal preference    Cost and availability  Emollients generally work best if applied to damp skin, if pH is below 7 (acidic), and if containing humectants such as urea or propylene glycol    Additional treatments include:   Topical steroid if itchy or there is dermatitis -- choose an emollient base    Topical calcineurin inhibitors if topical steroids are unsuitable  How can dry skin be prevented? Eliminate aggravating factors:   Reduce the frequency of bathing   A humidifier in winter and air conditioner in summer    Compare having a short shower with a prolonged soak in a bath   Use lukewarm, not hot, water   Replace standard soap with a substitute such as a synthetic detergent cleanser, water-miscible emollient, bath oil, anti-pruritic tar oil, colloidal oatmeal etc     Apply an emollient liberally and often, particularly shortly after bathing, and when itchy  The drier the skin, the thicker this should be, especially on the hands  What is the outlook for dry skin? A tendency to dry skin may persist life-long, or it may improve once contributing factors are controlled  Assessment and Plan:  Based on a thorough discussion of this condition and the management approach to it (including a comprehensive discussion of the known risks, side effects and potential benefits of treatment), the patient (family) agrees to implement the following specific plan:   Continue using Triamcinolone 0 1 % Cream      Assessment and Plan:   Atopic Dermatitis is a chronic, itchy skin condition that is very common in children but may occur at any age  It is also known as eczema or atopic eczema   It is the most common form of dermatitis  Atopic dermatitis usually occurs in people who have an atopic tendency    This means they may develop any or all of these closely linked conditions: Atopic dermatitis, asthma, hay fever (allergic rhinitis), eosinophilic esophagitis, and gastroenteritis  Often these conditions run within families with a parent, child or sibling also affected  A family history of asthma, eczema or hay fever is particularly useful in diagnosing atopic dermatitis in infants      Atopic dermatitis arises because of a complex interaction of genetic and environmental factors  These include defects in skin barrier function making the skin more susceptible to irritation by soap and other contact irritants, the weather, temperature and non-specific triggers  There is also an element of immune system dysregulation that is often present  By definition, it is chronic and has a "waxing-waning" nature; flares should be expected but with good education and treatment strategies can be minimized  Some specific tips we discussed:   Dry skin care   Usingonly mild cleansers (hypoallergenic and without fragrances) and fragrance free detergent (not unscented products which contain a masking agent); we discussed avoiding irritants/fragranced products   The importance of regular application of moisturizers daily (at least 3 times a day)   The known and theoretical side effects of steroids at length, including but not limited to atrophy of skin and increased pressure in eye (glaucoma) and clouding of the eye's lens (cataracts) if used in or around the eye for extended durations   The specific over-the-counter interventions and medications     Side effects, risks and benefits of topical and oral medications discussed

## 2020-07-29 DIAGNOSIS — R25.2 MUSCLE CRAMPS: ICD-10-CM

## 2020-07-29 RX ORDER — TIZANIDINE 2 MG/1
2 TABLET ORAL
Qty: 30 TABLET | Refills: 0 | Status: SHIPPED | OUTPATIENT
Start: 2020-07-29 | End: 2020-08-28

## 2020-08-28 DIAGNOSIS — R25.2 MUSCLE CRAMPS: ICD-10-CM

## 2020-08-28 RX ORDER — TIZANIDINE 2 MG/1
2 TABLET ORAL
Qty: 30 TABLET | Refills: 0 | Status: SHIPPED | OUTPATIENT
Start: 2020-08-28 | End: 2021-12-13 | Stop reason: SDUPTHER

## 2020-10-01 ENCOUNTER — TELEPHONE (OUTPATIENT)
Dept: BEHAVIORAL/MENTAL HEALTH CLINIC | Facility: CLINIC | Age: 25
End: 2020-10-01

## 2020-10-06 ENCOUNTER — TELEMEDICINE (OUTPATIENT)
Dept: PSYCHIATRY | Facility: CLINIC | Age: 25
End: 2020-10-06
Payer: COMMERCIAL

## 2020-10-06 VITALS — HEIGHT: 69 IN | BODY MASS INDEX: 18.51 KG/M2 | WEIGHT: 125 LBS

## 2020-10-06 DIAGNOSIS — F41.1 GAD (GENERALIZED ANXIETY DISORDER): Chronic | ICD-10-CM

## 2020-10-06 DIAGNOSIS — F33.42 MAJOR DEPRESSIVE DISORDER, RECURRENT EPISODE, IN FULL REMISSION (HCC): Primary | Chronic | ICD-10-CM

## 2020-10-06 DIAGNOSIS — F41.0 PANIC DISORDER WITHOUT AGORAPHOBIA: Chronic | ICD-10-CM

## 2020-10-06 DIAGNOSIS — G47.09 OTHER INSOMNIA: Chronic | ICD-10-CM

## 2020-10-06 PROCEDURE — 99213 OFFICE O/P EST LOW 20 MIN: CPT | Performed by: PSYCHIATRY & NEUROLOGY

## 2020-10-06 RX ORDER — HYDROXYZINE HYDROCHLORIDE 25 MG/1
50-75 TABLET, FILM COATED ORAL
Qty: 90 TABLET | Refills: 3 | Status: SHIPPED | OUTPATIENT
Start: 2020-10-06 | End: 2021-01-08 | Stop reason: SDUPTHER

## 2020-10-06 RX ORDER — ZINC GLUCONATE 50 MG
50 TABLET ORAL
COMMUNITY
End: 2021-06-10 | Stop reason: ALTCHOICE

## 2020-10-06 RX ORDER — CLONAZEPAM 0.5 MG/1
0.5 TABLET ORAL 2 TIMES DAILY PRN
Qty: 60 TABLET | Refills: 3 | Status: SHIPPED | OUTPATIENT
Start: 2020-10-19 | End: 2021-01-08 | Stop reason: SDUPTHER

## 2020-10-06 RX ORDER — DULOXETIN HYDROCHLORIDE 60 MG/1
60 CAPSULE, DELAYED RELEASE ORAL DAILY
Qty: 30 CAPSULE | Refills: 3 | Status: SHIPPED | OUTPATIENT
Start: 2020-10-06 | End: 2021-01-08 | Stop reason: SDUPTHER

## 2020-10-06 RX ORDER — LANOLIN ALCOHOL/MO/W.PET/CERES
5 CREAM (GRAM) TOPICAL
COMMUNITY
Start: 2020-05-29

## 2020-10-21 ENCOUNTER — TELEPHONE (OUTPATIENT)
Dept: OBGYN CLINIC | Facility: MEDICAL CENTER | Age: 25
End: 2020-10-21
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
## 2020-12-31 DIAGNOSIS — K21.9 GASTROESOPHAGEAL REFLUX DISEASE: ICD-10-CM

## 2020-12-31 RX ORDER — OMEPRAZOLE 20 MG/1
CAPSULE, DELAYED RELEASE ORAL
Qty: 60 CAPSULE | Refills: 5 | Status: SHIPPED | OUTPATIENT
Start: 2020-12-31

## 2021-01-08 ENCOUNTER — OFFICE VISIT (OUTPATIENT)
Dept: PSYCHIATRY | Facility: CLINIC | Age: 26
End: 2021-01-08
Payer: COMMERCIAL

## 2021-01-08 VITALS — WEIGHT: 120 LBS | HEIGHT: 69 IN | BODY MASS INDEX: 17.77 KG/M2

## 2021-01-08 DIAGNOSIS — G47.09 OTHER INSOMNIA: Chronic | ICD-10-CM

## 2021-01-08 DIAGNOSIS — F33.42 MAJOR DEPRESSIVE DISORDER, RECURRENT EPISODE, IN FULL REMISSION (HCC): Primary | Chronic | ICD-10-CM

## 2021-01-08 DIAGNOSIS — F41.1 GAD (GENERALIZED ANXIETY DISORDER): Chronic | ICD-10-CM

## 2021-01-08 DIAGNOSIS — F41.0 PANIC DISORDER WITHOUT AGORAPHOBIA: Chronic | ICD-10-CM

## 2021-01-08 PROCEDURE — 99214 OFFICE O/P EST MOD 30 MIN: CPT | Performed by: PSYCHIATRY & NEUROLOGY

## 2021-01-08 PROCEDURE — 90833 PSYTX W PT W E/M 30 MIN: CPT | Performed by: PSYCHIATRY & NEUROLOGY

## 2021-01-08 PROCEDURE — 1036F TOBACCO NON-USER: CPT | Performed by: PSYCHIATRY & NEUROLOGY

## 2021-01-08 PROCEDURE — 3008F BODY MASS INDEX DOCD: CPT | Performed by: PSYCHIATRY & NEUROLOGY

## 2021-01-08 PROCEDURE — 3725F SCREEN DEPRESSION PERFORMED: CPT | Performed by: PSYCHIATRY & NEUROLOGY

## 2021-01-08 RX ORDER — HYDROXYZINE HYDROCHLORIDE 25 MG/1
50-75 TABLET, FILM COATED ORAL
Qty: 90 TABLET | Refills: 4 | Status: SHIPPED | OUTPATIENT
Start: 2021-01-08 | End: 2021-05-07 | Stop reason: SDUPTHER

## 2021-01-08 RX ORDER — DULOXETIN HYDROCHLORIDE 60 MG/1
60 CAPSULE, DELAYED RELEASE ORAL DAILY
Qty: 30 CAPSULE | Refills: 4 | Status: SHIPPED | OUTPATIENT
Start: 2021-01-08 | End: 2021-05-07 | Stop reason: SDUPTHER

## 2021-01-08 RX ORDER — CLONAZEPAM 0.5 MG/1
0.5 TABLET ORAL 2 TIMES DAILY PRN
Qty: 60 TABLET | Refills: 3 | Status: SHIPPED | OUTPATIENT
Start: 2021-02-16 | End: 2021-03-25 | Stop reason: ALTCHOICE

## 2021-01-08 NOTE — BH TREATMENT PLAN
TREATMENT PLAN (Medication Management Only)        [facility]    Name/Date of Birth/MRN:  Zion Gamboa 22 y o  1995 MRN: [id number]  Date of Treatment Plan: January 8, 2021  Diagnosis/Diagnoses:   1  Major depressive disorder, recurrent episode, in full remission ([facility] )    2  KEVAN (generalized anxiety disorder)    3  Panic disorder without agoraphobia    4  Other insomnia      Strengths/Personal Resources for Self-Care: "I am taking medications"  Area/Areas of need (in own words): "getting off some medications"  1  Long Term Goal:   maintain control of anxiety, maintain stability of depression  Target Date: 4 months - 5/8/2021  Person/Persons responsible for completion of goal: Bibi Rahman  2  Short Term Objective (s) - How will we reach this goal?:   A  Provider new recommended medication/dosage changes and/or continue medication(s): continue current medications as prescribed (Cymbalta, Atarax, Klonopin and Melatonin)  B   N/A   C   N/A  Target Date: 4 months - 5/8/2021  Person/Persons Responsible for Completion of Goal: Bibi Rahman   Progress Towards Goals: stable  Treatment Modality: medication management every 4 months  Review due 180 days from date of this plan: 6 months - 7/8/2021  Expected length of service: maintenance unless revised  My Physician/PA/NP and I have developed this plan together and I agree to work on the goals and objectives  I understand the treatment goals that were developed for my treatment    Electronic Signatures: on file (unless signed below)    Griffin Montejo MD 01/08/21

## 2021-03-25 ENCOUNTER — OFFICE VISIT (OUTPATIENT)
Dept: PSYCHIATRY | Facility: CLINIC | Age: 26
End: 2021-03-25
Payer: COMMERCIAL

## 2021-03-25 VITALS — BODY MASS INDEX: 18.07 KG/M2 | WEIGHT: 122 LBS | HEIGHT: 69 IN

## 2021-03-25 DIAGNOSIS — F33.42 MAJOR DEPRESSIVE DISORDER, RECURRENT EPISODE, IN FULL REMISSION (HCC): Primary | Chronic | ICD-10-CM

## 2021-03-25 DIAGNOSIS — Z79.899 LONG-TERM USE OF HIGH-RISK MEDICATION: Chronic | ICD-10-CM

## 2021-03-25 DIAGNOSIS — G47.09 OTHER INSOMNIA: Chronic | ICD-10-CM

## 2021-03-25 DIAGNOSIS — F41.1 GAD (GENERALIZED ANXIETY DISORDER): Chronic | ICD-10-CM

## 2021-03-25 DIAGNOSIS — F90.2 ATTENTION DEFICIT HYPERACTIVITY DISORDER, COMBINED TYPE: Chronic | ICD-10-CM

## 2021-03-25 DIAGNOSIS — F41.0 PANIC DISORDER WITHOUT AGORAPHOBIA: Chronic | ICD-10-CM

## 2021-03-25 PROCEDURE — 1036F TOBACCO NON-USER: CPT | Performed by: PSYCHIATRY & NEUROLOGY

## 2021-03-25 PROCEDURE — 90833 PSYTX W PT W E/M 30 MIN: CPT | Performed by: PSYCHIATRY & NEUROLOGY

## 2021-03-25 PROCEDURE — 3725F SCREEN DEPRESSION PERFORMED: CPT | Performed by: PSYCHIATRY & NEUROLOGY

## 2021-03-25 PROCEDURE — 99214 OFFICE O/P EST MOD 30 MIN: CPT | Performed by: PSYCHIATRY & NEUROLOGY

## 2021-03-25 RX ORDER — ATOMOXETINE 18 MG/1
18 CAPSULE ORAL DAILY
Qty: 30 CAPSULE | Refills: 2 | Status: SHIPPED | OUTPATIENT
Start: 2021-03-25 | End: 2021-05-07 | Stop reason: SDUPTHER
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
## 2021-03-25 NOTE — TELEPHONE ENCOUNTER
Please also suggest coverage with Good Rx if insurance does not cover  Strattera 25 mg is actually less expensive with [facility] at [facility] pharmacy $33 for 30 day supply versus $53 for 18 mg  I can adjust the prescription if insurance does not cover and Rhoda Washburn is willing to pay out of pocket   Alternatives cannot be started until labs and EKG are completed

## 2021-03-26 ENCOUNTER — TELEPHONE (OUTPATIENT)
Dept: PSYCHIATRY | Facility: CLINIC | Age: 26
End: 2021-03-26

## 2021-03-26 RX ORDER — ATOMOXETINE 18 MG/1
18 CAPSULE ORAL DAILY
Qty: 30 CAPSULE | Refills: 2 | OUTPATIENT
Start: 2021-03-26 | End: 2021-06-24

## 2021-03-26 NOTE — TELEPHONE ENCOUNTER
Prior authorization process reviewed with expected turn around time of 24-72 hours as per their insurance  Will call when a decision is received  Nursing number given to call with questions  Also advised of Dr Mark Castro recommendation regarding Abelardo Perez verbalized understanding

## 2021-03-26 NOTE — TELEPHONE ENCOUNTER
Received notification from pharmacy that Atomoxetine needs a P A  Called and spoke with pharmacist to verify insurance information  [place]  ID [id number]   BIN [id number]  PCN [id number]  GRP [id number]    Initiated and sent P A  for Atomoxetine via fax to [facility] [employer]  Will await outcome

## 2021-04-02 ENCOUNTER — LAB (OUTPATIENT)
Dept: LAB | Age: 26
End: 2021-04-02
Payer: COMMERCIAL

## 2021-04-02 ENCOUNTER — APPOINTMENT (OUTPATIENT)
Dept: LAB | Age: 26
End: 2021-04-02
Payer: COMMERCIAL

## 2021-04-02 DIAGNOSIS — F90.2 ATTENTION DEFICIT HYPERACTIVITY DISORDER, COMBINED TYPE: Chronic | ICD-10-CM

## 2021-04-02 DIAGNOSIS — E03.9 HYPOTHYROIDISM, UNSPECIFIED TYPE: ICD-10-CM

## 2021-04-02 DIAGNOSIS — Z79.899 LONG-TERM USE OF HIGH-RISK MEDICATION: Chronic | ICD-10-CM

## 2021-04-02 LAB
BASOPHILS # BLD AUTO: 0.03 THOUSANDS/ΜL (ref 0–0.1)
BASOPHILS NFR BLD AUTO: 1 % (ref 0–1)
EOSINOPHIL # BLD AUTO: 0.07 THOUSAND/ΜL (ref 0–0.61)
EOSINOPHIL NFR BLD AUTO: 2 % (ref 0–6)
ERYTHROCYTE [DISTWIDTH] IN BLOOD BY AUTOMATED COUNT: 13.9 % (ref 11.6–15.1)
HCT VFR BLD AUTO: 39 % (ref 34.8–46.1)
HGB BLD-MCNC: 12.8 G/DL (ref 11.5–15.4)
IMM GRANULOCYTES # BLD AUTO: 0.01 THOUSAND/UL (ref 0–0.2)
IMM GRANULOCYTES NFR BLD AUTO: 0 % (ref 0–2)
LYMPHOCYTES # BLD AUTO: 1.92 THOUSANDS/ΜL (ref 0.6–4.47)
LYMPHOCYTES NFR BLD AUTO: 44 % (ref 14–44)
MCH RBC QN AUTO: 28.3 PG (ref 26.8–34.3)
MCHC RBC AUTO-ENTMCNC: 32.8 G/DL (ref 31.4–37.4)
MCV RBC AUTO: 86 FL (ref 82–98)
MONOCYTES # BLD AUTO: 0.34 THOUSAND/ΜL (ref 0.17–1.22)
MONOCYTES NFR BLD AUTO: 8 % (ref 4–12)
NEUTROPHILS # BLD AUTO: 2 THOUSANDS/ΜL (ref 1.85–7.62)
NEUTS SEG NFR BLD AUTO: 45 % (ref 43–75)
NRBC BLD AUTO-RTO: 0 /100 WBCS
PLATELET # BLD AUTO: 250 THOUSANDS/UL (ref 149–390)
PMV BLD AUTO: 10.7 FL (ref 8.9–12.7)
RBC # BLD AUTO: 4.52 MILLION/UL (ref 3.81–5.12)
WBC # BLD AUTO: 4.37 THOUSAND/UL (ref 4.31–10.16)

## 2021-04-02 PROCEDURE — 84443 ASSAY THYROID STIM HORMONE: CPT

## 2021-04-02 PROCEDURE — 80053 COMPREHEN METABOLIC PANEL: CPT

## 2021-04-02 PROCEDURE — 36415 COLL VENOUS BLD VENIPUNCTURE: CPT

## 2021-04-02 PROCEDURE — 85025 COMPLETE CBC W/AUTO DIFF WBC: CPT

## 2021-04-03 LAB
ALBUMIN SERPL BCP-MCNC: 4 G/DL (ref 3.5–5)
ALP SERPL-CCNC: 41 U/L (ref 46–116)
ALT SERPL W P-5'-P-CCNC: 15 U/L (ref 12–78)
ANION GAP SERPL CALCULATED.3IONS-SCNC: 3 MMOL/L (ref 4–13)
AST SERPL W P-5'-P-CCNC: 13 U/L (ref 5–45)
BILIRUB SERPL-MCNC: 0.29 MG/DL (ref 0.2–1)
BUN SERPL-MCNC: 11 MG/DL (ref 5–25)
CALCIUM SERPL-MCNC: 9.4 MG/DL (ref 8.3–10.1)
CHLORIDE SERPL-SCNC: 109 MMOL/L (ref 100–108)
CO2 SERPL-SCNC: 27 MMOL/L (ref 21–32)
CREAT SERPL-MCNC: 0.7 MG/DL (ref 0.6–1.3)
GFR SERPL CREATININE-BSD FRML MDRD: 121 ML/MIN/1.73SQ M
GLUCOSE P FAST SERPL-MCNC: 77 MG/DL (ref 65–99)
POTASSIUM SERPL-SCNC: 4.2 MMOL/L (ref 3.5–5.3)
PROT SERPL-MCNC: 7.3 G/DL (ref 6.4–8.2)
SODIUM SERPL-SCNC: 139 MMOL/L (ref 136–145)
TSH SERPL DL<=0.05 MIU/L-ACNC: 2.39 UIU/ML (ref 0.36–3.74)

## 2021-04-05 ENCOUNTER — OCCMED (OUTPATIENT)
Dept: URGENT CARE | Facility: CLINIC | Age: 26
End: 2021-04-05

## 2021-04-05 DIAGNOSIS — Z02.1 PHYSICAL EXAM, PRE-EMPLOYMENT: Primary | ICD-10-CM

## 2021-04-05 LAB — RUBV IGG SERPL IA-ACNC: 24.1 IU/ML

## 2021-04-05 PROCEDURE — 86787 VARICELLA-ZOSTER ANTIBODY: CPT | Performed by: PHYSICIAN ASSISTANT

## 2021-04-05 PROCEDURE — 86762 RUBELLA ANTIBODY: CPT | Performed by: PHYSICIAN ASSISTANT

## 2021-04-05 PROCEDURE — 86765 RUBEOLA ANTIBODY: CPT | Performed by: PHYSICIAN ASSISTANT

## 2021-04-05 PROCEDURE — 86735 MUMPS ANTIBODY: CPT | Performed by: PHYSICIAN ASSISTANT

## 2021-04-05 PROCEDURE — 86480 TB TEST CELL IMMUN MEASURE: CPT | Performed by: PHYSICIAN ASSISTANT

## 2021-04-06 LAB
ATRIAL RATE: 80 BPM
MEV IGG SER QL: NORMAL
MUV IGG SER QL: NORMAL
P AXIS: 76 DEGREES
PR INTERVAL: 162 MS
QRS AXIS: 245 DEGREES
QRSD INTERVAL: 94 MS
QT INTERVAL: 370 MS
QTC INTERVAL: 426 MS
T WAVE AXIS: 68 DEGREES
VENTRICULAR RATE: 80 BPM
VZV IGG SER IA-ACNC: ABNORMAL

## 2021-04-06 PROCEDURE — 93010 ELECTROCARDIOGRAM REPORT: CPT | Performed by: INTERNAL MEDICINE

## 2021-04-07 ENCOUNTER — TELEPHONE (OUTPATIENT)
Dept: PSYCHIATRY | Facility: CLINIC | Age: 26
End: 2021-04-07

## 2021-04-07 LAB
GAMMA INTERFERON BACKGROUND BLD IA-ACNC: 0.03 IU/ML
M TB IFN-G BLD-IMP: NEGATIVE
M TB IFN-G CD4+ BCKGRND COR BLD-ACNC: 0.02 IU/ML
M TB IFN-G CD4+ BCKGRND COR BLD-ACNC: 0.03 IU/ML
MITOGEN IGNF BCKGRD COR BLD-ACNC: >10 IU/ML

## 2021-04-07 NOTE — TELEPHONE ENCOUNTER
MARTELL not included with paperwork - she would need to list where to release information and confirm that her diagnosis, all medications, symptoms can be released to her employer  I could not find MARTELL in media in Epic chart  Also, job description from the employer has to be attached as the form cannot be completed without my attestation that I reviewed "attached job description"

## 2021-04-07 NOTE — TELEPHONE ENCOUNTER
Patient will be bringing in forms today (4/7) for Dr Irasema Raymond to fill out-for employment at [employer]  At that time she should fill out a MARTELL to move along the process

## 2021-04-07 NOTE — TELEPHONE ENCOUNTER
Patient dropped off the paperwork she needs for employment  She stated they need it by tomorrow   I advised her we would do our best

## 2021-04-07 NOTE — TELEPHONE ENCOUNTER
I called Abdifatah Britton back and told her that she had to fill out an MARTELL for this paperwork and I also told her that we needed a job description so that the provider can read through it in order to fill out the form needed  AN MARTELL was emailed to Abdifatah Britton with the return email address so that she can send it back to us

## 2021-04-08 NOTE — TELEPHONE ENCOUNTER
MARTELL received and description of job received  Both are in your mailbox and I will fax them today when the paper work is completed   Thank you
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
## 2021-05-07 ENCOUNTER — OFFICE VISIT (OUTPATIENT)
Dept: PSYCHIATRY | Facility: CLINIC | Age: 26
End: 2021-05-07
Payer: COMMERCIAL

## 2021-05-07 VITALS — BODY MASS INDEX: 18.96 KG/M2 | HEIGHT: 69 IN | WEIGHT: 128 LBS

## 2021-05-07 DIAGNOSIS — F41.0 PANIC DISORDER WITHOUT AGORAPHOBIA: Chronic | ICD-10-CM

## 2021-05-07 DIAGNOSIS — F90.2 ATTENTION DEFICIT HYPERACTIVITY DISORDER, COMBINED TYPE: Chronic | ICD-10-CM

## 2021-05-07 DIAGNOSIS — G47.09 OTHER INSOMNIA: Chronic | ICD-10-CM

## 2021-05-07 DIAGNOSIS — F41.1 GAD (GENERALIZED ANXIETY DISORDER): Chronic | ICD-10-CM

## 2021-05-07 DIAGNOSIS — F33.42 MAJOR DEPRESSIVE DISORDER, RECURRENT EPISODE, IN FULL REMISSION (HCC): Primary | Chronic | ICD-10-CM

## 2021-05-07 DIAGNOSIS — Z79.899 LONG-TERM USE OF HIGH-RISK MEDICATION: Chronic | ICD-10-CM

## 2021-05-07 PROCEDURE — 99213 OFFICE O/P EST LOW 20 MIN: CPT | Performed by: PSYCHIATRY & NEUROLOGY

## 2021-05-07 RX ORDER — ATOMOXETINE 40 MG/1
40 CAPSULE ORAL DAILY
Qty: 30 CAPSULE | Refills: 4 | Status: SHIPPED | OUTPATIENT
Start: 2021-05-07 | End: 2021-09-02 | Stop reason: SDUPTHER

## 2021-05-07 RX ORDER — IPRATROPIUM BROMIDE 21 UG/1
1-2 SPRAY, METERED NASAL 4 TIMES DAILY PRN
COMMUNITY
Start: 2021-05-04 | End: 2022-05-04

## 2021-05-07 RX ORDER — HYDROXYZINE HYDROCHLORIDE 25 MG/1
50-75 TABLET, FILM COATED ORAL
Qty: 90 TABLET | Refills: 4 | Status: SHIPPED | OUTPATIENT
Start: 2021-05-07 | End: 2021-09-02 | Stop reason: SDUPTHER

## 2021-05-07 RX ORDER — CETIRIZINE HYDROCHLORIDE 10 MG/1
10 TABLET ORAL DAILY
COMMUNITY
Start: 2021-05-04 | End: 2022-05-04

## 2021-05-07 RX ORDER — CLONAZEPAM 0.5 MG/1
.25-.5 TABLET ORAL DAILY PRN
Qty: 30 TABLET | Refills: 3 | Status: SHIPPED | OUTPATIENT
Start: 2021-05-07 | End: 2021-09-02 | Stop reason: ALTCHOICE

## 2021-05-07 RX ORDER — DULOXETIN HYDROCHLORIDE 60 MG/1
60 CAPSULE, DELAYED RELEASE ORAL DAILY
Qty: 30 CAPSULE | Refills: 4 | Status: SHIPPED | OUTPATIENT
Start: 2021-05-07 | End: 2021-09-02 | Stop reason: SDUPTHER

## 2021-05-07 NOTE — BH TREATMENT PLAN
TREATMENT PLAN (Medication Management Only)        [facility]    Name/Date of Birth/MRN:  Rodnye Knowles 22 y o  1995 MRN: [id number]  Date of Treatment Plan: May 7, 2021  Diagnosis/Diagnoses:   1  Major depressive disorder, recurrent episode, in full remission ([facility] )    2  KEVAN (generalized anxiety disorder)    3  Panic disorder without agoraphobia    4  Attention deficit hyperactivity disorder, combined type    5  Other insomnia      Strengths/Personal Resources for Self-Care: "my educational background"  Area/Areas of need (in own words): "I would like to do biofeedback"  1  Long Term Goal:   alleviate anxiety, maintain control of depression, help with ADHD symptoms  Target Date: 2 months - 7/7/2021  Person/Persons responsible for completion of goal: Abeba Carrillo  2  Short Term Objective (s) - How will we reach this goal?:   A  Provider new recommended medication/dosage changes and/or continue medication(s): increase Strattera, restart Klonopin, continue all other medications (Cymbalta, Atarax and Melatonin)  B   N/A   C   N/A  Target Date: 2 months - 7/7/2021  Person/Persons Responsible for Completion of Goal: Abeba Carrillo   Progress Towards Goals: progressing  Treatment Modality: medication management every 2 months, continue psychotherapy with own therapist  Review due 180 days from date of this plan: 6 months - 11/7/2021  Expected length of service: ongoing treatment unless revised  My Physician/PA/NP and I have developed this plan together and I agree to work on the goals and objectives  I understand the treatment goals that were developed for my treatment    Electronic Signatures: on file (unless signed below)    Michel Quinones MD 05/07/21

## 2021-05-12 ENCOUNTER — OFFICE VISIT (OUTPATIENT)
Dept: DERMATOLOGY | Facility: CLINIC | Age: 26
End: 2021-05-12
Payer: COMMERCIAL

## 2021-05-12 VITALS — HEIGHT: 69 IN | BODY MASS INDEX: 18.96 KG/M2 | WEIGHT: 128 LBS | TEMPERATURE: 96.9 F

## 2021-05-12 DIAGNOSIS — L73.0 ACNE SCARRING: ICD-10-CM

## 2021-05-12 DIAGNOSIS — L70.0 ACNE VULGARIS: Primary | ICD-10-CM

## 2021-05-12 PROCEDURE — 1036F TOBACCO NON-USER: CPT | Performed by: DERMATOLOGY

## 2021-05-12 PROCEDURE — 99213 OFFICE O/P EST LOW 20 MIN: CPT | Performed by: DERMATOLOGY

## 2021-05-12 RX ORDER — DAPSONE 75 MG/G
GEL TOPICAL
Qty: 90 G | Refills: 6 | Status: SHIPPED | OUTPATIENT
Start: 2021-05-12

## 2021-05-12 NOTE — PROGRESS NOTES
[facility] Follow Up Note    Patient Name: Facundo Jamison  Encounter Date: 05/12/2021    Today's Chief Concerns:   Concern #1:  Follow up Acne       Current Medications:    Current Outpatient Medications:     atoMOXetine (STRATTERA) 40 mg capsule, Take 1 capsule (40 mg total) by mouth daily, Disp: 30 capsule, Rfl: 4    cetirizine (ZyrTEC Allergy) 10 mg tablet, Take 10 mg by mouth daily, Disp: , Rfl:     Cholecalciferol (VITAMIN D-3) 5000 units TABS, Take 1 tablet daily, Disp: 1 tablet, Rfl: 0    clonazePAM (KlonoPIN) 0 5 mg tablet, Take 0 5-1 tablets (0 25-0 5 mg total) by mouth daily as needed for anxiety, Disp: 30 tablet, Rfl: 3    dicyclomine (BENTYL) 10 mg capsule, Take 1 capsule (10 mg total) by mouth every 8 (eight) hours as needed (Abdominal pain), Disp: 20 capsule, Rfl: 0    DULoxetine (CYMBALTA) 60 mg delayed release capsule, Take 1 capsule (60 mg total) by mouth daily, Disp: 30 capsule, Rfl: 4    EPIDUO FORTE 0 3-2 5 % GEL, APPLY PEA SIZED AMOUNT TO ENTIRE FACE EVERY EVENING, Disp: , Rfl:     famotidine (PEPCID) 10 mg tablet, Take 10 mg by mouth 2 (two) times a day as needed , Disp: , Rfl:     fexofenadine (ALLEGRA) 180 MG tablet, Take 180 mg by mouth daily, Disp: , Rfl:     hydrOXYzine HCL (ATARAX) 25 mg tablet, Take 2-3 tablets (50-75 mg total) by mouth daily at bedtime as needed (for anxiety or sleep), Disp: 90 tablet, Rfl: 4    ipratropium (ATROVENT) 0 03 % nasal spray, 1-2 sprays into each nostril 4 (four) times a day as needed, Disp: , Rfl:     MAGNESIUM PO, Take by mouth, Disp: , Rfl:     melatonin 3 mg, Take 5 mg by mouth, Disp: , Rfl:     norethindrone-ethinyl estradiol (MICROGESTIN 1/20) 1-20 MG-MCG per tablet, Take 1 tablet by mouth daily, Disp: 28 tablet, Rfl: 11    omeprazole (PriLOSEC) 20 mg delayed release capsule, TAKE 2 CAPSULES BY MOUTH EVERY DAY, Disp: 60 capsule, Rfl: 5    tiZANidine (ZANAFLEX) 2 mg tablet, TAKE 1 TABLET (2 MG TOTAL) BY MOUTH DAILY AT BEDTIME, Disp: 30 tablet, Rfl: 0    triamcinolone (KENALOG) 0 1 % cream, APPLY SMALL AMOUNT TWICE A DAY X 2 WEEKS TO THE B/L HANDS AND ELBOW THEN AS NEEDED FOR FLARES, Disp: , Rfl: 0    vitamin B-12 (VITAMIN B-12) 1,000 mcg tablet, Take 5,000 mcg by mouth daily, Disp: , Rfl:     zinc gluconate 50 mg tablet, Take 50 mg by mouth, Disp: , Rfl:     CONSTITUTIONAL:   Vitals:    05/12/21 1430   Temp: (!) 96 9 °F (36 1 °C)   TempSrc: Tympanic   Weight: 58 1 kg (128 lb)   Height: 5' 9" (1 753 m)       Specific Alerts:    Have you been seen by a [facility] Dermatologist in the last 3 years? YES    Are you pregnant or planning to become pregnant? No    Are you currently or planning to be nursing or breast feeding? No    Allergies   Allergen Reactions    Lexapro [Escitalopram]     Robaxin [Methocarbamol] GI Intolerance       May we call your Preferred Phone number to discuss your specific medical information? YES    May we leave a detailed message that includes your specific medical information? YES    Have you traveled outside of the [facility] in the past 3 months? No    Do you currently have a pacemaker or defibrillator? No    Do you have any artificial heart valves, joints, plates, screws, rods, stents, pins, etc? No   - If Yes, were any placed within the last 2 years? Do you require any medications prior to a surgical procedure? No   - If Yes, for which procedure? - If Yes, what medications to you require? Are you taking any medications that cause you to bleed more easily ("blood thinners") No    Have you ever experienced a rapid heartbeat with epinephrine? No    Have you ever been treated with "gold" (gold sodium thiomalate) therapy? No    [facility] can help with wrinkles, "laugh lines," facial volume loss, "double chin," "love handles," age spots, and more  Are you interested in learning today about some of the skin enhancement procedures that we offer?  (If Yes, please provide more detail) No    Review of Systems:  Have you recently had or currently have any of the following? · Fever or chills: No  · Night Sweats: No  · Headaches: No  · Weight Gain: No  · Weight Loss: No  · Blurry Vision: No  · Nausea: No  · Vomiting: No  · Diarrhea: No  · Blood in Stool: No  · Abdominal Pain: No  · Itchy Skin: No  · Painful Joints: No  · Swollen Joints: No  · Muscle Pain: No  · Irregular Mole: No  · Sun Burn: No  · Dry Skin: YES  · Skin Color Changes: No  · Scar or Keloid: YES  · Cold Sores/Fever Blisters: No  · Bacterial Infections/MRSA: No  · Anxiety: YES  · Depression: No  · Suicidal or Homicidal Thoughts: No      PSYCH: Normal mood and affect  EYES: Normal conjunctiva  ENT: Normal lips and oral mucosa  CARDIOVASCULAR: No edema  RESPIRATORY: Normal respirations  HEME/LYMPH/IMMUNO:  No regional lymphadenopathy except as noted below in ASSESSMENT AND PLAN BY DIAGNOSIS    FULL ORGAN SYSTEM SKIN EXAM (SKIN)  Ears, Face Normal except as noted below in Assessment   Neck, Cervical Chain Nodes Normal except as noted below in Assessment   Right Hand/Fingers Normal except as noted below in Assessment   Left Hand/Fingers Normal except as noted below in Assessment                               ACNE VULGARIS ("COMMON ACNE") and acne scarring     Physical Exam:   Anatomic Location Affected: face,   Morphological Description: Open/Closed Comedones, one erythematous nodule on right cheek, diffuse scars on bilateral cheeks     Additional History of Present Condition:  Patient is currently using prescribed Epi-Duo forte and over the counter Benzoyl Peroxide 10% wash and birthontrol  Patient completed a course of Accutane in the past, and tretinoin  Has been doing microneedling and has done 6 sessions thus far     Assessment and Plan:   We reviewed the causes of acne, the kinds of acne, and the expected clinical course     We discussed treatment options ranging from over-the-counter products, topical retinoids, antibiotics, BP, hormonal therapies (OCPs/spironolactone), and isotretinoin (Accutane)   We reviewed specific over-the-counter interventions and medications  Recommended typical hygiene measures washing regularly with mild cleanser, and refraining from picking and popping any pimples  Recommended non-comedogenic sunscreen use daily   Expectations of therapy discussed  Side effects, risks and benefits of medications discussed  A comprehensive handout with treatment plan provided  The phone number to call in case of questions or concerns (and instructions to stop medications in such a scenario) was provided   After lengthy discussion of etiology and treatment options, we decided to implement the following personalized treatment plan:      Mornin  Wash with over the counter Cerave Acne Foaming Cream cleanser with Benzoyl peroxide  This will bleach your towels  Will not bleach your skin  2  Start using prescribed Dapsone 7 5% gel to entire affected area   3  Follow with an oil-free sunscreen (SPF 30 or higher)  Some options include Neutrogena oil-free moisturizer with SPF     Night:    1  Wash your face with a gentle face wash - like Cetaphil wash, Cerave Hydrating , LaRoche Hydrating Cleanser   2  Pat dry your face, wait 15 mins and then apply a pea-sized amount of Epi-duo Forte, apply an even thin layer on your face  Can be drying  Start by using every other night and then build it up to every night  Avoid the eye area  - If this is not covered by insurance, you can get over the counter Differin Gel or LaRoche Adapalene 0 1% gel  You can apply an oil free moisturizer on top of this medicine to combat the dryness  · Discussed with patient about laser treatments  Will find out if CO2 lasers for acne are done by Dr Saintclair Mazzoni  Will discuss with him and see if he does laser treatment for acne scars  · Discussed chemical peel procedures with patient as well if interested   If done it's recommended not to do micro needling when chemical peel is done  The method is called "TCA CROSS"    · Also discussed resurfx laser for scars  We have the laser at [facility] office  Treatments are done 1 month apart  Laser resurfacing: $750 (full face); $2100 for 3 treatments  $375 (half face); $1000 for 3 treatments  Patient must arrive 45 minutes before appointment to numb  Treats scars including acne scars         Make sure all products you use on face are labeled as "non-comedongenic" or "oil-free" or " does not clog pores"  Acne can be frustrating and difficult to treat  Most acne regimens take 2-3 months to see an improvement, so stick with them  Don't give up! As always, call your doctor if you have any concerns about your medications          Scribe Attestation    I,:  Sandra Elliott MA am acting as a scribe while in the presence of the attending physician :       I,:  Sheila Miller MD personally performed the services described in this documentation    as scribed in my presence :

## 2021-05-12 NOTE — PATIENT INSTRUCTIONS
ACNE VULGARIS ("COMMON ACNE")      Assessment and Plan:   We reviewed the causes of acne, the kinds of acne, and the expected clinical course   We discussed treatment options ranging from over-the-counter products, topical retinoids, antibiotics, BP, hormonal therapies (OCPs/spironolactone), and isotretinoin (Accutane)   We reviewed specific over-the-counter interventions and medications  Recommended typical hygiene measures washing regularly with mild cleanser, and refraining from picking and popping any pimples  Recommended non-comedogenic sunscreen use daily   Expectations of therapy discussed  Side effects, risks and benefits of medications discussed  A comprehensive handout with treatment plan provided  The phone number to call in case of questions or concerns (and instructions to stop medications in such a scenario) was provided   After lengthy discussion of etiology and treatment options, we decided to implement the following personalized treatment plan:      Mornin  Wash with over the counter Cerave Acne Foaming Cream cleanser with Benzoyl peroxide  This will bleach your towels  Will not bleach your skin  2  Start using prescribed Dapsone 7 5% gel to entire affected area   3  Follow with an oil-free sunscreen (SPF 30 or higher)  Some options include Neutrogena oil-free moisturizer with SPF     Night:    1  Wash your face with a gentle face wash - like Cetaphil wash, Cerave Hydrating , LaRoche Hydrating Cleanser   2  Pat dry your face, wait 15 mins and then apply a pea-sized amount of Epi-duo Forte, apply an even thin layer on your face  Can be drying  Start by using every other night and then build it up to every night  Avoid the eye area  - If this is not covered by insurance, you can get over the counter Differin Gel or LaRoche Adapalene 0 1% gel  You can apply an oil free moisturizer on top of this medicine to combat the dryness         · Discussed with patient about laser treatments  Will find out if CO2 lasers for acne are done by Dr Linette Fofana  Will discuss with him and see if he does laser treatment for acne scars  · Discussed chemical peel procedures with patient as well if interested  If done it's recommended not to do micro needling when chemical peel is done  The method is called "TCA CROSS"    · Also discussed resurfx laser for scars  We have the laser at [facility] [place] office  Treatments are done 1 month apart    Laser resurfacing: $750 (full face); $2100 for 3 treatments  $375 (half face); $1000 for 3 treatments  Patient must arrive 45 minutes before appointment to numb  Treats scars including acne scars         Make sure all products you use on face are labeled as "non-comedongenic" or "oil-free" or " does not clog pores"  Acne can be frustrating and difficult to treat  Most acne regimens take 2-3 months to see an improvement, so stick with them  Don't give up! As always, call your doctor if you have any concerns about your medications
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
## 2021-06-10 ENCOUNTER — TELEMEDICINE (OUTPATIENT)
Dept: PSYCHIATRY | Facility: CLINIC | Age: 26
End: 2021-06-10
Payer: COMMERCIAL

## 2021-06-10 VITALS — BODY MASS INDEX: 18.51 KG/M2 | HEIGHT: 69 IN | WEIGHT: 125 LBS

## 2021-06-10 DIAGNOSIS — F33.42 MAJOR DEPRESSIVE DISORDER, RECURRENT EPISODE, IN FULL REMISSION (HCC): Primary | Chronic | ICD-10-CM

## 2021-06-10 DIAGNOSIS — F41.0 PANIC DISORDER WITHOUT AGORAPHOBIA: Chronic | ICD-10-CM

## 2021-06-10 DIAGNOSIS — F90.2 ATTENTION DEFICIT HYPERACTIVITY DISORDER, COMBINED TYPE: Chronic | ICD-10-CM

## 2021-06-10 DIAGNOSIS — G47.09 OTHER INSOMNIA: Chronic | ICD-10-CM

## 2021-06-10 DIAGNOSIS — F41.1 GAD (GENERALIZED ANXIETY DISORDER): Chronic | ICD-10-CM

## 2021-06-10 PROCEDURE — 3008F BODY MASS INDEX DOCD: CPT | Performed by: DERMATOLOGY

## 2021-06-10 PROCEDURE — 99214 OFFICE O/P EST MOD 30 MIN: CPT | Performed by: PSYCHIATRY & NEUROLOGY

## 2021-06-22 ENCOUNTER — OFFICE VISIT (OUTPATIENT)
Dept: URGENT CARE | Facility: CLINIC | Age: 26
End: 2021-06-22
Payer: COMMERCIAL

## 2021-06-22 VITALS
HEIGHT: 69 IN | OXYGEN SATURATION: 99 % | BODY MASS INDEX: 18.51 KG/M2 | TEMPERATURE: 98 F | RESPIRATION RATE: 18 BRPM | SYSTOLIC BLOOD PRESSURE: 108 MMHG | HEART RATE: 76 BPM | DIASTOLIC BLOOD PRESSURE: 70 MMHG | WEIGHT: 125 LBS

## 2021-06-22 DIAGNOSIS — R22.0 RIGHT FACIAL SWELLING: Primary | ICD-10-CM

## 2021-06-22 PROCEDURE — 99213 OFFICE O/P EST LOW 20 MIN: CPT | Performed by: PHYSICIAN ASSISTANT

## 2021-06-22 RX ORDER — PREDNISONE 10 MG/1
TABLET ORAL
Qty: 21 TABLET | Refills: 0 | Status: SHIPPED | OUTPATIENT
Start: 2021-06-22 | End: 2021-09-02 | Stop reason: ALTCHOICE

## 2021-06-22 NOTE — PROGRESS NOTES
[address] [facility]        NAME: Dillon Go is a [address] 39 y o  female  : 1995    MRN: [id number]  DATE: 2021  TIME: 5:43 PM    Assessment and Plan   Right facial swelling [R22 0]  1  Right facial swelling  predniSONE 10 mg tablet         Patient Instructions     Take prednisone as directed  Encourage f/up w/ treating provider  Monitor for worsening symptoms  Follow up with PCP in 3-5 days  Proceed to  ER if symptoms worsen  Chief Complaint     Chief Complaint   Patient presents with    Edema     2 days ago noticed right cheek swelling and discomfort  Ice and ibuprofen not effective for relief  Pt states this was same area where she received dermal fillers appx 1 mos ago  History of Present Illness       Patient presents with complaint of swelling of her right cheek since Johnathon morning  She denies any known provocation but notes that she had dermal Fillers placed bilaterally about a month ago  Patient states that a very small amount of filler was placed on the right side  She states that she has pain associated with swelling but denies fever, chills, sweats, and other symptoms  She states that she is otherwise well  Patient reports that she has been icing and taking over-the-counter NSAIDs without improvement  Review of Systems   Review of Systems   Constitutional: Negative for chills and fever  HENT: Positive for facial swelling  Negative for ear pain and sore throat  Eyes: Negative for pain and visual disturbance  Respiratory: Negative for cough and shortness of breath  Cardiovascular: Negative for chest pain and palpitations  Gastrointestinal: Negative for abdominal pain and vomiting  Genitourinary: Negative for dysuria and hematuria  Musculoskeletal: Negative for arthralgias and back pain  Skin: Negative for color change and rash  Neurological: Negative for seizures and syncope  All other systems reviewed and are negative          Current Medications       Current Outpatient Medications:     atoMOXetine (STRATTERA) 40 mg capsule, Take 1 capsule (40 mg total) by mouth daily, Disp: 30 capsule, Rfl: 4    cetirizine (ZyrTEC Allergy) 10 mg tablet, Take 10 mg by mouth daily, Disp: , Rfl:     Cholecalciferol (VITAMIN D-3) 5000 units TABS, Take 1 tablet daily, Disp: 1 tablet, Rfl: 0    clonazePAM (KlonoPIN) 0 5 mg tablet, Take 0 5-1 tablets (0 25-0 5 mg total) by mouth daily as needed for anxiety, Disp: 30 tablet, Rfl: 3    Dapsone 7 5 % GEL, Apply topically to a face area daily  , Disp: 90 g, Rfl: 6    dicyclomine (BENTYL) 10 mg capsule, Take 1 capsule (10 mg total) by mouth every 8 (eight) hours as needed (Abdominal pain), Disp: 20 capsule, Rfl: 0    DULoxetine (CYMBALTA) 60 mg delayed release capsule, Take 1 capsule (60 mg total) by mouth daily, Disp: 30 capsule, Rfl: 4    EPIDUO FORTE 0 3-2 5 % GEL, APPLY PEA SIZED AMOUNT TO ENTIRE FACE EVERY EVENING, Disp: , Rfl:     famotidine (PEPCID) 10 mg tablet, Take 10 mg by mouth 2 (two) times a day as needed , Disp: , Rfl:     fexofenadine (ALLEGRA) 180 MG tablet, Take 180 mg by mouth daily, Disp: , Rfl:     hydrOXYzine HCL (ATARAX) 25 mg tablet, Take 2-3 tablets (50-75 mg total) by mouth daily at bedtime as needed (for anxiety or sleep), Disp: 90 tablet, Rfl: 4    ipratropium (ATROVENT) 0 03 % nasal spray, 1-2 sprays into each nostril 4 (four) times a day as needed, Disp: , Rfl:     MAGNESIUM PO, Take by mouth, Disp: , Rfl:     melatonin 3 mg, Take 5 mg by mouth, Disp: , Rfl:     norethindrone-ethinyl estradiol (MICROGESTIN 1/20) 1-20 MG-MCG per tablet, Take 1 tablet by mouth daily, Disp: 28 tablet, Rfl: 11    omeprazole (PriLOSEC) 20 mg delayed release capsule, TAKE 2 CAPSULES BY MOUTH EVERY DAY, Disp: 60 capsule, Rfl: 5    predniSONE 10 mg tablet, Take 6 tabs on day 1, 5 tabs on day 2, 4 tabs on day 3, 3 tabs on day 4, 2 tabs on day 5, and 1 tab on day 6, Disp: 21 tablet, Rfl: 0    tiZANidine (ZANAFLEX) 2 mg tablet, TAKE 1 TABLET (2 MG TOTAL) BY MOUTH DAILY AT BEDTIME, Disp: 30 tablet, Rfl: 0    triamcinolone (KENALOG) 0 1 % cream, APPLY SMALL AMOUNT TWICE A DAY X 2 WEEKS TO THE B/L HANDS AND ELBOW THEN AS NEEDED FOR FLARES, Disp: , Rfl: 0    vitamin B-12 (VITAMIN B-12) 1,000 mcg tablet, Take 5,000 mcg by mouth daily, Disp: , Rfl:     Current Allergies     Allergies as of 06/22/2021 - Reviewed 06/22/2021   Allergen Reaction Noted    Lexapro [escitalopram]  10/02/2018    Robaxin [methocarbamol] GI Intolerance 04/09/2019            The following portions of the patient's history were reviewed and updated as appropriate: allergies, current medications, past family history, past medical history, past social history, past surgical history and problem list      Past Medical History:   Diagnosis Date    Allergic     Allergic rhinitis     Anxiety     Aphthous ulcer     Chronic back pain     Eczema     Fibromyalgia     Fracture of pelvis ([address] )     Last Assessed:  3/1/17    GERD (gastroesophageal reflux disease)     Idiopathic hypotension     Jaw fracture ([address] )     Last Assessed:  3/1/17    Nystagmus     Pelvic fracture (HCC)     TMJ (dislocation of temporomandibular joint)     Vitamin B12 deficiency     Vitamin D deficiency        Past Surgical History:   Procedure Laterality Date    SEPTOPLASTY      SKIN BIOPSY      WISDOM TOOTH EXTRACTION         Family History   Problem Relation Age of Onset    Coronary artery disease Mother     Hyperlipidemia Mother     Hypothyroidism Mother     Depression Mother     Diabetes Maternal Grandfather     Hernia Father     No Known Problems Maternal Grandmother     No Known Problems Paternal Grandmother     No Known Problems Maternal Aunt     No Known Problems Paternal Aunt     ADD / ADHD Brother     Substance Abuse Neg Hx     Suicidality Neg Hx          Medications have been verified          Objective   /70   Pulse 76   Temp 98 °F (36 7 °C)   Resp 18   Ht 5' 9" (1 753 m)   Wt 56 7 kg (125 lb)   SpO2 99%   BMI 18 46 kg/m²   No LMP recorded  (Menstrual status: Birth Control)  Physical Exam     Physical Exam  Vitals and nursing note reviewed  Constitutional:       General: She is not in acute distress  Appearance: Normal appearance  She is well-developed  She is not ill-appearing or diaphoretic  HENT:      Head: Normocephalic and atraumatic  Comments: Swelling of right side of face; firm, no erythema or induration; no wounds     Nose: Nose normal       Mouth/Throat:      Mouth: Mucous membranes are moist       Pharynx: Oropharynx is clear  No oropharyngeal exudate or posterior oropharyngeal erythema  Eyes:      Conjunctiva/sclera: Conjunctivae normal       Pupils: Pupils are equal, round, and reactive to light  Cardiovascular:      Rate and Rhythm: Normal rate and regular rhythm  Heart sounds: Normal heart sounds  Pulmonary:      Effort: Pulmonary effort is normal  No respiratory distress  Breath sounds: Normal breath sounds  No stridor  Musculoskeletal:      Cervical back: Normal range of motion and neck supple  Lymphadenopathy:      Cervical: No cervical adenopathy  Skin:     General: Skin is warm and dry  Capillary Refill: Capillary refill takes less than 2 seconds  Findings: No rash  Neurological:      Mental Status: She is alert and oriented to person, place, and time  Cranial Nerves: No cranial nerve deficit  Sensory: No sensory deficit  Psychiatric:         Behavior: Behavior normal          Thought Content:  Thought content normal

## 2021-07-21 ENCOUNTER — COSMETIC (OUTPATIENT)
Dept: DERMATOLOGY | Facility: CLINIC | Age: 26
End: 2021-07-21

## 2021-07-21 DIAGNOSIS — L73.0 ACNE SCARRING: Primary | ICD-10-CM

## 2021-07-21 PROCEDURE — REJUV1: Performed by: DERMATOLOGY

## 2021-07-21 NOTE — PATIENT INSTRUCTIONS
POST-PEEL INSTRUCTIONS     Day 1  You will leave the office with the peel solution on your skin  Skin may feel tacky and have a yellow to orange tinge  You may experience redness, itching, tightness, burning  Take Advil and ice the skin if needed  Make sure you apply sunscreen if youre outside  Try to stay out of the sun  Night of Day 1  Wash your face with a gentle wash  Apply moisturizer only  Day 2  The following morning you should wash & dry your face, apply moisturizer and sunscreen with a minimum SPF 30  Reapply moisturizer to the face multiple times a day  If you face it too itchy, you can apply hydrocortisone 1% available over the counter  Day 3  Peeling starts 48 hours into the peel  It typically starts around the chin and moves up  Let the process happen naturally  DO NOT PICK OR PULL THE SKIN or you will cause skin damage! Day 3 - Day 7  Continue the same regimen  Wash and dry the face, apply moisturizer and SPF 30  Hydrocortisone only if needed  You must re-apply the moisturizer at least three times daily throughout the entire peel process  SUNBURN ALERT  Extra precautions should be taken to avoid sun exposure following the peel  Protect your skin with continued use of SPF 30 or higher sunscreen to prevent pigmentation  - you should stay out of the sun (within reason) during the peel process which typically lasts 1 week    - you should not work out or sweat excessively for the first 3 days   - call our office in case of any issues at [phone]         After peeling process is complete:  - you may resume the use of retinols, alpha hydroxy acids ONLY after the peeling process is complete     - wait until the peeling is completed before having ANY other facial procedures including: facials, microdermabrasion, laser treatments (including laser hair removal), cosmetic injections     Variations in amount of peeling:  - degree of peeling varies based on skin type and skin condition  The success of the peel is not determined by the amount of peeling, but by the end results that the peel will produce  Regardless of the degree of peeling, the skin is till sloughing off at an accelerated rate which will result in the improvement of skin tone and texture  SERIES OF PEELS  To get the best results from your chemical peel, we recommend a set of 3 or more peels applied 1 month apart until desired results are achieved  Results are cumulative  When youre ready, call us to book your next appointment

## 2021-07-21 NOTE — PROGRESS NOTES
Screening questions:  1  Are you pregnant or breastfeeding? No  2  Have you ever had a cold sore on your face? No  3  Have you used Accutane in the past 6 months? No  4  Recent history of radiation or chemotherapy? No  5  Any allergies to aspirin or other topicals? No      Peel # 1  Peel Name:  Dane Reasons for acne scarring  Step 2 Lot # [id number], Exp: 1/2023  Step 3 Lot # [id number], Exp: 3/2023      Patient washed and dried her face  Written consent obtained and pictures taken  Vaseline applied to sensitive areas: corners of mouth, corners of the nose and eyes  Skin was completely dry and prepped with 4 mL of prepping solution with a clean 4X4 gauze  Contents of the peel solution (step 2) applied to the face using a clean 4x4 gauze slightly saturated with solution  2 total passes with 5 minute pause between each pass  Yogi cooler was used to fan the patient  After 5 minutes, retinol solution (step 3) was applied to the face  using a clean 4x4 gauze slightly saturated with solution  Face was monitored for frosting  Patient tolerated the procedure well  Post peel written instructions provided and all questions answered  THIS IS A COSMETIC PATIENT WHO PAID OUT OF POCKET AT TIME OF VISIT  DO NOT BILL             Scribe Attestation    I,:  Kim Body am acting as a scribe while in the presence of the attending physician :       I,:  Virginia Crooks MD personally performed the services described in this documentation    as scribed in my presence :

## 2021-08-19 ENCOUNTER — TELEPHONE (OUTPATIENT)
Dept: DERMATOLOGY | Facility: CLINIC | Age: 26
End: 2021-08-19

## 2021-08-19 NOTE — TELEPHONE ENCOUNTER
Called pt as upcoming appt needs to be rescheduled as the dr will not be in the office, left m to cb and reschedule   dany

## 2021-08-24 NOTE — TELEPHONE ENCOUNTER
Called pt to reschedule apt, informed me she thought apt was 8/25, she will cb to reschedule apt for chemical peel
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
## 2021-09-02 ENCOUNTER — TELEMEDICINE (OUTPATIENT)
Dept: PSYCHIATRY | Facility: CLINIC | Age: 26
End: 2021-09-02
Payer: COMMERCIAL

## 2021-09-02 VITALS — WEIGHT: 125 LBS | BODY MASS INDEX: 18.51 KG/M2 | HEIGHT: 69 IN

## 2021-09-02 DIAGNOSIS — F33.42 MAJOR DEPRESSIVE DISORDER, RECURRENT EPISODE, IN FULL REMISSION (HCC): Primary | Chronic | ICD-10-CM

## 2021-09-02 DIAGNOSIS — F41.1 GAD (GENERALIZED ANXIETY DISORDER): Chronic | ICD-10-CM

## 2021-09-02 DIAGNOSIS — F41.0 PANIC DISORDER WITHOUT AGORAPHOBIA: Chronic | ICD-10-CM

## 2021-09-02 DIAGNOSIS — F90.2 ATTENTION DEFICIT HYPERACTIVITY DISORDER, COMBINED TYPE: Chronic | ICD-10-CM

## 2021-09-02 DIAGNOSIS — G47.09 OTHER INSOMNIA: Chronic | ICD-10-CM

## 2021-09-02 PROCEDURE — 99214 OFFICE O/P EST MOD 30 MIN: CPT | Performed by: PSYCHIATRY & NEUROLOGY

## 2021-09-02 RX ORDER — HYDROXYZINE HYDROCHLORIDE 25 MG/1
50-75 TABLET, FILM COATED ORAL
Qty: 90 TABLET | Refills: 4 | Status: SHIPPED | OUTPATIENT
Start: 2021-09-02 | End: 2022-04-22 | Stop reason: SDUPTHER

## 2021-09-02 RX ORDER — ATOMOXETINE 80 MG/1
80 CAPSULE ORAL DAILY
Qty: 30 CAPSULE | Refills: 4 | Status: SHIPPED | OUTPATIENT
Start: 2021-09-02 | End: 2022-01-03 | Stop reason: SDUPTHER

## 2021-09-02 RX ORDER — DULOXETIN HYDROCHLORIDE 60 MG/1
60 CAPSULE, DELAYED RELEASE ORAL DAILY
Qty: 30 CAPSULE | Refills: 4 | Status: SHIPPED | OUTPATIENT
Start: 2021-09-02 | End: 2022-01-03 | Stop reason: SDUPTHER

## 2021-09-02 NOTE — BH TREATMENT PLAN
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"  Area/Areas of need (in own words): "my sleep schedule to the best of my ability"  1  Long Term Goal:   maintain control of anxiety, maintain stability of depression, help with sleep  Target Date: 4 months - 1/2/2022  Person/Persons responsible for completion of goal: Josephine Resides  2  Short Term Objective (s) - How will we reach this goal?:   A  Provider new recommended medication/dosage changes and/or continue medication(s): increase Strattera, discontinue Klonopin, continue all other medications (Cymbalta, Atarax and Melatonin)  B   N/A   C   N/A  Target Date: 4 months - 1/2/2022  Person/Persons Responsible for Completion of Goal: Josephine Resides   Progress Towards Goals: stable  Treatment Modality: medication management every 4 months, continue psychotherapy with own therapist  Review due 180 days from date of this plan: 4 months - 1/2/2022  Expected length of service: ongoing treatment unless revised  My Physician/PA/NP and I have developed this plan together and I agree to work on the goals and objectives  I understand the treatment goals that were developed for my treatment    Electronic Signatures: on file (unless signed below)    Suresh Dee MD 09/02/21

## 2021-09-27 ENCOUNTER — ANNUAL EXAM (OUTPATIENT)
Dept: OBGYN CLINIC | Facility: CLINIC | Age: 26
End: 2021-09-27
Payer: COMMERCIAL

## 2021-09-27 VITALS — BODY MASS INDEX: 19.02 KG/M2 | DIASTOLIC BLOOD PRESSURE: 72 MMHG | WEIGHT: 128.8 LBS | SYSTOLIC BLOOD PRESSURE: 108 MMHG

## 2021-09-27 DIAGNOSIS — Z78.9 USES BIRTH CONTROL: Primary | ICD-10-CM

## 2021-09-27 PROCEDURE — 99395 PREV VISIT EST AGE 18-39: CPT | Performed by: OBSTETRICS & GYNECOLOGY

## 2021-09-27 RX ORDER — DROSPIRENONE AND ETHINYL ESTRADIOL 0.02-3(28)
1 KIT ORAL DAILY
Qty: 90 TABLET | Refills: 3 | Status: SHIPPED | OUTPATIENT
Start: 2021-09-27 | End: 2022-06-28

## 2021-09-27 NOTE — PROGRESS NOTES
ASSESSMENT & PLAN: Jolene Davis is a 22 y o  [place] Lakelorin Saucedo with normal gynecologic exam     1   Routine well woman exam done today  2  Pap:  The patient's last pap was   It was normal     Pap was done today  Current ASCCP Guidelines reviewed  Due   3  STD testing  was not done   4  Gardasil recommendations reviewed is vaccinated  5  The following were reviewed in today's visit: breast self exam  6  Contraception: Will switch to Rachele due to breakthrough spotting    CC: Annual Gynecologic Examination    HPI: Jolene Davis is a 22 y o  [place] Lakelorin Saucedo who presents for annual gynecologic examination  She has the following concerns: continues to have spotting on OCPs, likely secondary to her schedule  Health Maintenance:    She wears her seatbelt routinely  She does perform regular monthly self breast exams  She feels safe at home  Past Medical History:   Diagnosis Date    Allergic     Allergic rhinitis     Anxiety     Aphthous ulcer     Chronic back pain     Eczema     Fibromyalgia     Fracture of pelvis (HCC)     Last Assessed:  3/1/17    GERD (gastroesophageal reflux disease)     Idiopathic hypotension     Jaw fracture (HCC)     Last Assessed:  3/1/17    Nystagmus     Pelvic fracture (HCC)     TMJ (dislocation of temporomandibular joint)     Vitamin B12 deficiency     Vitamin D deficiency        Past Surgical History:   Procedure Laterality Date    SEPTOPLASTY      SKIN BIOPSY      WISDOM TOOTH EXTRACTION         OB/Gyn History:    Pt does not have menstrual issues  On OCPs    History of sexually transmitted infection: No   History of abnormal pap smears: No      Patient is not currently sexually active  The current method of family planning is OCP (estrogen/progesterone)      OB History        0    Para   0    Term   0       0    AB   0    Living   0       SAB   0    TAB   0    Ectopic   0    Multiple   0    Live Births   0                 Family History Problem Relation Age of Onset    Coronary artery disease Mother     Hyperlipidemia Mother     Hypothyroidism Mother     Depression Mother     Diabetes Maternal Grandfather     Hernia Father     No Known Problems Maternal Grandmother     No Known Problems Paternal Grandmother     No Known Problems Maternal Aunt     No Known Problems Paternal Aunt     ADD / ADHD Brother     Substance Abuse Neg Hx     Suicidality Neg Hx        Social History:  Social History     Socioeconomic History    Marital status: Single     Spouse name: Not on file    Number of children: 0    Years of education: bachelor's degree    Highest education level: Bachelor's degree (e g , BA, AB, BS)   Occupational History    Occupation: Registered Nurse   Tobacco Use    Smoking status: Never Smoker    Smokeless tobacco: Never Used    Tobacco comment: No second hand smoke exposure   Vaping Use    Vaping Use: Never used   Substance and Sexual Activity    Alcohol use: Not Currently     Comment: Social    Drug use: No    Sexual activity: Not Currently     Birth control/protection: Pill   Other Topics Concern    Not on file   Social History Narrative    Education: bachelor's degree in biology and nursing degree from [employer])    Learning Disabilities: none    Marital History: single    Children: none    Living Arrangement: lives alone in an apartment    Occupational History: 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    Legal History: none     History: None     Social Determinants of Health     Financial Resource Strain: Low Risk     Difficulty of Paying Living Expenses: Not hard at all   Food Insecurity: No Food Insecurity    Worried About [address] in the Last Year: Never true   [address] in the Last Year: Never true   Transportation Needs: No Transportation Needs    Lack of Transportation (Medical):  No    Lack of Transportation (Non-Medical): No   Physical Activity: Insufficiently Active    Days of Exercise per Week: 1 day    Minutes of Exercise per Session: 60 min   Stress: Stress Concern Present    Feeling of Stress : To some extent   Social Connections: Moderately Integrated    Frequency of Communication with Friends and Family: More than three times a week    Frequency of Social Gatherings with Friends and Family: More than three times a week    Attends Congregation Services: 1 to 4 times per year   [employer] or Organizations: Yes    Attends Club or Organization Meetings: More than 4 times per year    Marital Status: Never    Intimate Partner Violence: Not At Risk    Fear of Current or Ex-Partner: No    Emotionally Abused: No    Physically Abused: No    Sexually Abused: No     Patient is single  Patient is currently employed [employer]    Allergies   Allergen Reactions    Lexapro [Escitalopram]     Robaxin [Methocarbamol] GI Intolerance         Current Outpatient Medications:     atoMOXetine (STRATTERA) 80 MG capsule, Take 1 capsule (80 mg total) by mouth daily, Disp: 30 capsule, Rfl: 4    cetirizine (ZyrTEC Allergy) 10 mg tablet, Take 10 mg by mouth daily, Disp: , Rfl:     Cholecalciferol (VITAMIN D-3) 5000 units TABS, Take 1 tablet daily, Disp: 1 tablet, Rfl: 0    Dapsone 7 5 % GEL, Apply topically to a face area daily  , Disp: 90 g, Rfl: 6    dicyclomine (BENTYL) 10 mg capsule, Take 1 capsule (10 mg total) by mouth every 8 (eight) hours as needed (Abdominal pain), Disp: 20 capsule, Rfl: 0    DULoxetine (CYMBALTA) 60 mg delayed release capsule, Take 1 capsule (60 mg total) by mouth daily, Disp: 30 capsule, Rfl: 4    EPIDUO FORTE 0 3-2 5 % GEL, APPLY PEA SIZED AMOUNT TO ENTIRE FACE EVERY EVENING, Disp: , Rfl:     famotidine (PEPCID) 10 mg tablet, Take 10 mg by mouth 2 (two) times a day as needed , Disp: , Rfl:     fexofenadine (ALLEGRA) 180 MG tablet, Take 180 mg by mouth daily, Disp: , Rfl:     hydrOXYzine HCL (ATARAX) 25 mg tablet, Take 2-3 tablets (50-75 mg total) by mouth daily at bedtime as needed (for anxiety or sleep), Disp: 90 tablet, Rfl: 4    ipratropium (ATROVENT) 0 03 % nasal spray, 1-2 sprays into each nostril 4 (four) times a day as needed, Disp: , Rfl:     MAGNESIUM PO, Take by mouth, Disp: , Rfl:     melatonin 3 mg, Take 5 mg by mouth, Disp: , Rfl:     norethindrone-ethinyl estradiol (MICROGESTIN 1/20) 1-20 MG-MCG per tablet, Take 1 tablet by mouth daily, Disp: 28 tablet, Rfl: 11    omeprazole (PriLOSEC) 20 mg delayed release capsule, TAKE 2 CAPSULES BY MOUTH EVERY DAY, Disp: 60 capsule, Rfl: 5    tiZANidine (ZANAFLEX) 2 mg tablet, TAKE 1 TABLET (2 MG TOTAL) BY MOUTH DAILY AT BEDTIME, Disp: 30 tablet, Rfl: 0    triamcinolone (KENALOG) 0 1 % cream, APPLY SMALL AMOUNT TWICE A DAY X 2 WEEKS TO THE B/L HANDS AND ELBOW THEN AS NEEDED FOR FLARES, Disp: , Rfl: 0    vitamin B-12 (VITAMIN B-12) 1,000 mcg tablet, Take 5,000 mcg by mouth daily, Disp: , Rfl:     Review of Systems:  Constitutional :no fever, feels well, no tiredness, no recent weight gain or loss  ENT: no ear ache, no loss of hearing, no nosebleeds or nasal discharge, no sore throat or hoarseness  Cardiovascular: no complaints of slow or fast heart beat, no chest pain, no palpitations, no leg claudication or lower extremity edema  Respiratory: no complaints of shortness of shortness of breath, no OWEN  Breasts:no complaints of breast pain, breast lump, or nipple discharge  Gastrointestinal: no complaints of abdominal pain, constipation, nausea, vomiting, or diarrhea or bloody stools  Genitourinary : no complaints of dysuria, incontinence, pelvic pain, no dysmenorrhea, vaginal discharge or abnormal vaginal bleeding and as noted in HPI  Musculoskeletal: no complaints of arthralgia, no myalgia, no joint swelling or stiffness, no limb pain or swelling    Integumentary: no complaints of skin rash or lesion, itching or dry skin  Neurological: no complaints of headache, no confusion, no numbness or tingling, no dizziness or fainting    Objective      /72 (BP Location: Right arm, Patient Position: Sitting, Cuff Size: Standard)   Wt 58 4 kg (128 lb 12 8 oz)   LMP 08/30/2021   BMI 19 02 kg/m²     General:   appears stated age, cooperative, alert normal mood and affect   Neck: normal, supple,trachea midline, no masses   Heart: regular rate and rhythm, S1, S2 normal, no murmur, click, rub or gallop   Lungs: clear to auscultation bilaterally   Breasts: normal appearance, no masses or tenderness, Inspection negative, No nipple retraction or dimpling, No nipple discharge or bleeding, No axillary or supraclavicular adenopathy, Normal to palpation without dominant masses; fibrocystic breasts bilaterally    Abdomen: soft, non-tender, without masses or organomegaly   Vulva: normal female genitalia, Bartholin's, Urethra, [place] normal, no lesions, normal female hair distribution   Vagina: normal vagina, no discharge, exudate, lesion, or erythema   Urethra: normal   Cervix: Normal, no discharge  Uterus: normal size, contour, position, consistency, mobility, non-tender   Adnexa: normal adnexa and no mass, fullness, tenderness   Lymphatic palpation of lymph nodes in neck, axilla, groin and/or other locations: no lymphadenopathy or masses noted   Skin normal skin turgor and no rashes     Psychiatric orientation to person, place, and time: normal  mood and affect: normal

## 2021-10-01 ENCOUNTER — HOSPITAL ENCOUNTER (EMERGENCY)
Facility: HOSPITAL | Age: 26
Discharge: HOME | End: 2021-10-01
Attending: EMERGENCY MEDICINE | Admitting: EMERGENCY MEDICINE
Payer: OTHER MISCELLANEOUS

## 2021-10-01 ENCOUNTER — APPOINTMENT (EMERGENCY)
Dept: RADIOLOGY | Facility: HOSPITAL | Age: 26
End: 2021-10-01
Payer: OTHER MISCELLANEOUS

## 2021-10-01 ENCOUNTER — TELEPHONE (OUTPATIENT)
Dept: PSYCHIATRY | Facility: CLINIC | Age: 26
End: 2021-10-01

## 2021-10-01 VITALS
RESPIRATION RATE: 18 BRPM | HEART RATE: 75 BPM | SYSTOLIC BLOOD PRESSURE: 124 MMHG | WEIGHT: 128.31 LBS | BODY MASS INDEX: 18.95 KG/M2 | TEMPERATURE: 98.2 F | DIASTOLIC BLOOD PRESSURE: 78 MMHG | OXYGEN SATURATION: 99 %

## 2021-10-01 DIAGNOSIS — S93.409A ANKLE SPRAIN: Primary | ICD-10-CM

## 2021-10-01 PROCEDURE — 73610 X-RAY EXAM OF ANKLE: CPT

## 2021-10-01 PROCEDURE — 99282 EMERGENCY DEPT VISIT SF MDM: CPT | Performed by: SURGERY

## 2021-10-01 PROCEDURE — 73630 X-RAY EXAM OF FOOT: CPT

## 2021-10-01 PROCEDURE — 99283 EMERGENCY DEPT VISIT LOW MDM: CPT

## 2021-10-02 ENCOUNTER — IMMUNIZATIONS (OUTPATIENT)
Dept: FAMILY MEDICINE CLINIC | Facility: MEDICAL CENTER | Age: 26
End: 2021-10-02

## 2021-10-02 DIAGNOSIS — Z23 ENCOUNTER FOR IMMUNIZATION: Primary | ICD-10-CM

## 2021-10-02 PROCEDURE — 91300 SARSCOV2 VAC 30MCG/0.3ML IM: CPT

## 2021-10-04 ENCOUNTER — TELEPHONE (OUTPATIENT)
Dept: PSYCHIATRY | Facility: CLINIC | Age: 26
End: 2021-10-04

## 2021-10-06 ENCOUNTER — TELEPHONE (OUTPATIENT)
Dept: PSYCHIATRY | Facility: CLINIC | Age: 26
End: 2021-10-06

## 2021-10-08 ENCOUNTER — TELEPHONE (OUTPATIENT)
Dept: PSYCHIATRY | Facility: CLINIC | Age: 26
End: 2021-10-08

## 2021-10-11 ENCOUNTER — TELEPHONE (OUTPATIENT)
Dept: PSYCHIATRY | Facility: CLINIC | Age: 26
End: 2021-10-11

## 2021-10-29 DIAGNOSIS — F33.42 MAJOR DEPRESSIVE DISORDER, RECURRENT EPISODE, IN FULL REMISSION (HCC): Chronic | ICD-10-CM

## 2021-10-29 RX ORDER — DULOXETIN HYDROCHLORIDE 60 MG/1
60 CAPSULE, DELAYED RELEASE ORAL DAILY
Qty: 30 CAPSULE | Refills: 4 | Status: CANCELLED | OUTPATIENT
Start: 2021-10-29 | End: 2022-03-28

## 2021-10-29 NOTE — TELEPHONE ENCOUNTER
Halle Horne needs to call [facility] and have them find and fill prescription that was escripted on 9/2/21 for 30 days with 4 RF

## 2021-12-13 DIAGNOSIS — R25.2 MUSCLE CRAMPS: ICD-10-CM

## 2021-12-13 RX ORDER — TIZANIDINE 2 MG/1
2 TABLET ORAL
Qty: 30 TABLET | Refills: 0 | Status: SHIPPED | OUTPATIENT
Start: 2021-12-13 | End: 2022-01-28 | Stop reason: SDUPTHER

## 2021-12-29 ENCOUNTER — OFFICE VISIT (OUTPATIENT)
Dept: FAMILY MEDICINE CLINIC | Facility: CLINIC | Age: 26
End: 2021-12-29
Payer: COMMERCIAL

## 2021-12-29 VITALS
TEMPERATURE: 99 F | RESPIRATION RATE: 14 BRPM | HEIGHT: 69 IN | BODY MASS INDEX: 18.81 KG/M2 | DIASTOLIC BLOOD PRESSURE: 70 MMHG | WEIGHT: 127 LBS | HEART RATE: 91 BPM | SYSTOLIC BLOOD PRESSURE: 90 MMHG

## 2021-12-29 DIAGNOSIS — E78.00 ELEVATED LDL CHOLESTEROL LEVEL: ICD-10-CM

## 2021-12-29 DIAGNOSIS — E53.8 VITAMIN B12 DEFICIENCY: ICD-10-CM

## 2021-12-29 DIAGNOSIS — R10.30 LOWER ABDOMINAL PAIN: ICD-10-CM

## 2021-12-29 DIAGNOSIS — M54.50 CHRONIC BILATERAL LOW BACK PAIN WITHOUT SCIATICA: Primary | ICD-10-CM

## 2021-12-29 DIAGNOSIS — K12.0 APHTHOUS ULCER: ICD-10-CM

## 2021-12-29 DIAGNOSIS — L70.0 ACNE VULGARIS: Primary | ICD-10-CM

## 2021-12-29 DIAGNOSIS — J30.9 ALLERGIC RHINITIS, UNSPECIFIED SEASONALITY, UNSPECIFIED TRIGGER: ICD-10-CM

## 2021-12-29 DIAGNOSIS — Z00.00 ANNUAL PHYSICAL EXAM: ICD-10-CM

## 2021-12-29 DIAGNOSIS — E55.9 VITAMIN D DEFICIENCY: ICD-10-CM

## 2021-12-29 DIAGNOSIS — G89.29 CHRONIC BILATERAL LOW BACK PAIN WITHOUT SCIATICA: Primary | ICD-10-CM

## 2021-12-29 PROCEDURE — 99395 PREV VISIT EST AGE 18-39: CPT | Performed by: NURSE PRACTITIONER

## 2021-12-29 RX ORDER — ADAPALENE AND BENZOYL PEROXIDE 3; 25 MG/G; MG/G
GEL TOPICAL
Qty: 60 G | Refills: 5 | Status: SHIPPED | OUTPATIENT
Start: 2021-12-29 | End: 2022-01-13 | Stop reason: SDUPTHER

## 2021-12-29 NOTE — PROGRESS NOTES
ADULT ANNUAL [facility] PRIMARY CARE    NAME: Ana Mayer  AGE: 32 y o  SEX: female  : 1995     DATE: 2022     Assessment and Plan:     Problem List Items Addressed This Visit        Respiratory    Allergic rhinitis     Referral to allergy placed         Relevant Orders    Ambulatory referral to Allergy       Other    Chronic bilateral low back pain without sciatica - Primary     Referral to PT has helped in the past           Relevant Orders    Ambulatory referral to Physical Therapy    Elevated LDL cholesterol level     Routine labs checked  Relevant Orders    Lipid panel    Vitamin D deficiency     Follow up lab ordered  Relevant Orders    Vitamin D 25 hydroxy    Lower abdominal pain    Relevant Orders    Ambulatory referral to Gastroenterology    Aphthous ulcer     Discussed magic mouth wash  Wants consult with GI because of recent dental visit  Relevant Orders    Ambulatory referral to Gastroenterology    Vitamin B12 deficiency     Follow up lab ordered  Relevant Orders    Vitamin B12    Annual physical exam     Patient advised that labs ordered will apply to deductible cost           Relevant Orders    CBC and differential    Magnesium    TSH, 3rd generation with Free T4 reflex    Comprehensive metabolic panel          Immunizations and preventive care screenings were discussed with patient today  Appropriate education was printed on patient's after visit summary  Counseling:  Alcohol/drug use: discussed moderation in alcohol intake, the recommendations for healthy alcohol use, and avoidance of illicit drug use  Injury prevention: discussed safety/seat belts, safety helmets, smoke detectors, carbon dioxide detectors, and smoking near bedding or upholstery    Sexual health: discussed sexually transmitted diseases, partner selection, use of condoms, avoidance of unintended pregnancy, and contraceptive alternatives  · Exercise: the importance of regular exercise/physical activity was discussed  Recommend exercise 3-5 times per week for at least 30 minutes  Return if symptoms worsen or fail to improve  Chief Complaint:     Chief Complaint   Patient presents with    Physical Exam     Patient here for yearly check up  Pt will like to discuss referral for PT for chronic back pain and canker sore flare up      History of Present Illness:     Adult Annual Physical   Patient here for a comprehensive physical exam  The patient reports she had issues with canker sores, has a few now  Went to see dentist who told her she might be deficient in vitamins and to talk to a GI doctor  She also would like PT script for her back  Diet and Physical Activity  · Diet/Nutrition: poor diet  · Exercise: moderate cardiovascular exercise, 1-2 times a week on average and 30-60 minutes on average  Depression Screening  PHQ-2/9 Depression Screening         General Health  · Sleep: sleeps poorly and works night shift  · Hearing: normal - bilateral   · Vision: no vision problems  · Dental: regular dental visits, brushes teeth twice daily and flosses teeth occasionally  /GYN Health  · Patient is: premenopausal  · Last menstrual period: 12/20/21 approximate  · Contraceptive method: oral contraceptives  Review of Systems:     Review of Systems   Constitutional: Negative for chills and fever  HENT: Positive for postnasal drip  Negative for ear pain, sinus pressure, sinus pain and sore throat  Eyes: Negative for pain and visual disturbance  Respiratory: Negative for cough and shortness of breath  Cardiovascular: Negative for chest pain and palpitations  Gastrointestinal: Negative for abdominal pain and vomiting  Genitourinary: Negative for dysuria and hematuria  Musculoskeletal: Positive for back pain (chronic)  Negative for arthralgias  Skin: Negative for color change and rash  Allergic/Immunologic: Positive for environmental allergies  Neurological: Negative for seizures and syncope  All other systems reviewed and are negative  Past Medical History:     Past Medical History:   Diagnosis Date    Allergic     Allergic rhinitis     Anxiety     Aphthous ulcer     Chronic back pain     Eczema     Fibromyalgia     Fracture of pelvis ([address] )     Last Assessed:  3/1/17    GERD (gastroesophageal reflux disease)     Idiopathic hypotension     Jaw fracture (HCC)     Last Assessed:  3/1/17    Nystagmus     Pelvic fracture (HCC)     TMJ (dislocation of temporomandibular joint)     Vitamin B12 deficiency     Vitamin D deficiency       Past Surgical History:     Past Surgical History:   Procedure Laterality Date    SEPTOPLASTY      SKIN BIOPSY      WISDOM TOOTH EXTRACTION        Social History:     Social History     Socioeconomic History    Marital status: Single     Spouse name: None    Number of children: 0    Years of education: bachelor's degree    Highest education level:  Bachelor's degree (e g , BA, AB, BS)   Occupational History    Occupation: Registered Nurse   Tobacco Use    Smoking status: Never Smoker    Smokeless tobacco: Never Used    Tobacco comment: No second hand smoke exposure   Vaping Use    Vaping Use: Never used   Substance and Sexual Activity    Alcohol use: Not Currently     Comment: Social    Drug use: No    Sexual activity: Not Currently     Birth control/protection: Pill   Other Topics Concern    None   Social History Narrative    Education: bachelor's degree in biology and nursing degree from [employer])    Learning Disabilities: none    Marital History: single    Children: none    Living Arrangement: lives alone in an apartment    Occupational History: 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    Legal History: none     History: None Social Determinants of Health     Financial Resource Strain: Low Risk     Difficulty of Paying Living Expenses: Not hard at all   Food Insecurity: No Food Insecurity    Worried About Running Out of Food in the Last Year: Never true    Elisabeth of Food in the Last Year: Never true   Transportation Needs: No Transportation Needs    Lack of Transportation (Medical): No    Lack of Transportation (Non-Medical): No   Physical Activity: Insufficiently Active    Days of Exercise per Week: 1 day    Minutes of Exercise per Session: 60 min   Stress: Stress Concern Present    Feeling of Stress : To some extent   Social Connections: Moderately Integrated    Frequency of Communication with Friends and Family: More than three times a week    Frequency of Social Gatherings with Friends and Family: More than three times a week    Attends Jewish Services: 1 to 4 times per year   [employer] or Organizations:  Yes    Attends Club or Organization Meetings: More than 4 times per year    Marital Status: Never    Intimate Partner Violence: Not At Risk    Fear of Current or Ex-Partner: No    Emotionally Abused: No    Physically Abused: No    Sexually Abused: No   Housing Stability: Not on file      Family History:     Family History   Problem Relation Age of Onset    Coronary artery disease Mother     Hyperlipidemia Mother     Hypothyroidism Mother     Depression Mother     Diabetes Maternal Grandfather     Hernia Father     No Known Problems Maternal Grandmother     No Known Problems Paternal Grandmother     No Known Problems Maternal Aunt     No Known Problems Paternal Aunt     ADD / ADHD Brother     Substance Abuse Neg Hx     Suicidality Neg Hx       Current Medications:     Current Outpatient Medications   Medication Sig Dispense Refill    atoMOXetine (STRATTERA) 80 MG capsule Take 1 capsule (80 mg total) by mouth daily 30 capsule 4    cetirizine (ZyrTEC Allergy) 10 mg tablet Take 10 mg by mouth daily      Cholecalciferol (VITAMIN D-3) 5000 units TABS Take 1 tablet daily 1 tablet 0    Dapsone 7 5 % GEL Apply topically to a face area daily  90 g 6    drospirenone-ethinyl estradiol (LAZARA) 3-0 02 MG per tablet Take 1 tablet by mouth daily 90 tablet 3    DULoxetine (CYMBALTA) 60 mg delayed release capsule Take 1 capsule (60 mg total) by mouth daily 30 capsule 4    Epiduo Forte 0 3-2 5 % GEL Apply a pea size amount to the entire face once daily 60 g 5    famotidine (PEPCID) 10 mg tablet Take 10 mg by mouth 2 (two) times a day as needed       fexofenadine (ALLEGRA) 180 MG tablet Take 180 mg by mouth daily      hydrOXYzine HCL (ATARAX) 25 mg tablet Take 2-3 tablets (50-75 mg total) by mouth daily at bedtime as needed (for anxiety or sleep) 90 tablet 4    ipratropium (ATROVENT) 0 03 % nasal spray 1-2 sprays into each nostril 4 (four) times a day as needed      MAGNESIUM PO Take by mouth      melatonin 3 mg Take 5 mg by mouth      omeprazole (PriLOSEC) 20 mg delayed release capsule TAKE 2 CAPSULES BY MOUTH EVERY DAY 60 capsule 5    tiZANidine (ZANAFLEX) 2 mg tablet Take 1 tablet (2 mg total) by mouth daily at bedtime 30 tablet 0    triamcinolone (KENALOG) 0 1 % cream APPLY SMALL AMOUNT TWICE A DAY X 2 WEEKS TO THE B/L HANDS AND ELBOW THEN AS NEEDED FOR FLARES  0    vitamin B-12 (VITAMIN B-12) 1,000 mcg tablet Take 5,000 mcg by mouth daily      dicyclomine (BENTYL) 10 mg capsule Take 1 capsule (10 mg total) by mouth every 8 (eight) hours as needed (Abdominal pain) (Patient not taking: Reported on 12/29/2021 ) 20 capsule 0     No current facility-administered medications for this visit  Allergies:      Allergies   Allergen Reactions    Lexapro [Escitalopram]     Robaxin [Methocarbamol] GI Intolerance      Physical Exam:     BP 90/70 (BP Location: Right arm, Patient Position: Sitting, Cuff Size: Adult)   Pulse 91   Temp 99 °F (37 2 °C) (Temporal)   Resp 14   Ht 5' 9" (1 273 m)   Wt 57 6 kg (127 lb)   BMI 18 75 kg/m²     Physical Exam  Vitals and nursing note reviewed  Constitutional:       General: She is not in acute distress  Appearance: Normal appearance  She is well-developed and normal weight  She is not ill-appearing, toxic-appearing or diaphoretic  HENT:      Head: Normocephalic and atraumatic  Right Ear: Tympanic membrane and external ear normal       Left Ear: Tympanic membrane and external ear normal       Mouth/Throat:      Pharynx: No oropharyngeal exudate  Eyes:      Extraocular Movements: Extraocular movements intact  Conjunctiva/sclera: Conjunctivae normal       Pupils: Pupils are equal, round, and reactive to light  Cardiovascular:      Rate and Rhythm: Normal rate and regular rhythm  Heart sounds: Normal heart sounds  No murmur heard  Pulmonary:      Effort: Pulmonary effort is normal       Breath sounds: Normal breath sounds  Abdominal:      General: Bowel sounds are normal       Palpations: Abdomen is soft  Tenderness: There is no abdominal tenderness  Musculoskeletal:         General: Normal range of motion  Cervical back: Normal range of motion  Skin:     General: Skin is warm  Capillary Refill: Capillary refill takes less than 2 seconds  Neurological:      Mental Status: She is alert and oriented to person, place, and time  Psychiatric:         Mood and Affect: Mood normal          Behavior: Behavior normal          Thought Content:  Thought content normal          Judgment: Judgment normal           NATIONETTE Hollis  [facility] PRIMARY CARE

## 2022-01-02 PROBLEM — Z00.00 ANNUAL PHYSICAL EXAM: Status: ACTIVE | Noted: 2022-01-02

## 2022-01-03 DIAGNOSIS — F90.2 ATTENTION DEFICIT HYPERACTIVITY DISORDER, COMBINED TYPE: Chronic | ICD-10-CM

## 2022-01-03 DIAGNOSIS — F33.42 MAJOR DEPRESSIVE DISORDER, RECURRENT EPISODE, IN FULL REMISSION (HCC): Primary | Chronic | ICD-10-CM

## 2022-01-03 RX ORDER — ATOMOXETINE 80 MG/1
80 CAPSULE ORAL DAILY
Qty: 30 CAPSULE | Refills: 2 | Status: SHIPPED | OUTPATIENT
Start: 2022-01-03 | End: 2022-04-22 | Stop reason: SDUPTHER

## 2022-01-03 RX ORDER — DULOXETIN HYDROCHLORIDE 60 MG/1
60 CAPSULE, DELAYED RELEASE ORAL DAILY
Qty: 30 CAPSULE | Refills: 2 | Status: SHIPPED | OUTPATIENT
Start: 2022-01-03 | End: 2022-04-22 | Stop reason: SDUPTHER

## 2022-01-04 DIAGNOSIS — R10.30 LOWER ABDOMINAL PAIN: ICD-10-CM

## 2022-01-04 RX ORDER — DICYCLOMINE HYDROCHLORIDE 10 MG/1
10 CAPSULE ORAL EVERY 8 HOURS PRN
Qty: 20 CAPSULE | Refills: 0 | Status: SHIPPED | OUTPATIENT
Start: 2022-01-04

## 2022-01-05 ENCOUNTER — TELEPHONE (OUTPATIENT)
Dept: DERMATOLOGY | Facility: CLINIC | Age: 27
End: 2022-01-05

## 2022-01-05 NOTE — TELEPHONE ENCOUNTER
Patient left a voice mail message 1/5/22 regarding prior auth for epiduo forte  Left message for patient informing a mychart message was sent to notify approval of prior auth and to call back if any problems picking up prescription

## 2022-01-07 NOTE — TELEPHONE ENCOUNTER
Pt calling to state that she went to pickup prescription  Epiduo Forte 0 3-2 5 % GEL  and was informed that there is no prior authorization   (I do not see any approval of prescription in media?)    Please assist pt with pa  Thank you!

## 2022-01-07 NOTE — TELEPHONE ENCOUNTER
Called pharmacy and verified prior authorization was not completed for Epiduo Ediliae    Note on 12/29 states approved but no documentation from insurance

## 2022-01-10 NOTE — TELEPHONE ENCOUNTER
Pt called for status update on PA  Please call patient to advise when she can go back to the pharmacy to  medication  She can be reached at [phone]

## 2022-01-11 NOTE — TELEPHONE ENCOUNTER
She needs to be seen in the office before I can submit PA it has been over 6 months and there is no documentation that medication is giving improvement  Can one of you please call and schedule follow up appointment

## 2022-01-13 ENCOUNTER — OFFICE VISIT (OUTPATIENT)
Dept: DERMATOLOGY | Facility: CLINIC | Age: 27
End: 2022-01-13
Payer: COMMERCIAL

## 2022-01-13 VITALS — HEIGHT: 69 IN | BODY MASS INDEX: 18.81 KG/M2 | TEMPERATURE: 97.5 F | WEIGHT: 127 LBS

## 2022-01-13 DIAGNOSIS — L70.0 ACNE VULGARIS: ICD-10-CM

## 2022-01-13 PROCEDURE — 99213 OFFICE O/P EST LOW 20 MIN: CPT | Performed by: DERMATOLOGY

## 2022-01-13 RX ORDER — ADAPALENE AND BENZOYL PEROXIDE 3; 25 MG/G; MG/G
GEL TOPICAL
Qty: 60 G | Refills: 5 | Status: SHIPPED | OUTPATIENT
Start: 2022-01-13

## 2022-01-13 NOTE — PATIENT INSTRUCTIONS
Assessment and Plan:   We reviewed the causes of acne, the kinds of acne, and the expected clinical course   We discussed treatment options ranging from over-the-counter products, topical retinoids, antibiotics, BP, hormonal therapies (OCPs/spironolactone), and isotretinoin (Accutane)   We reviewed specific over-the-counter interventions and medications  Recommended typical hygiene measures including water-based facial products, washing regularly with mild cleanser, and refraining from picking and popping any pimples   Recommended non-comedogenic sunscreen use daily   Expectations of therapy discussed  Side effects, risks and benefits of medications discussed   A comprehensive handout on Acne was provided   The phone number to call in case of questions or concerns (and instructions to stop medications in such a scenario) was provided   After lengthy discussion of etiology and treatment options, we decided to implement the following personalized treatment plan:    Based on a thorough discussion of this condition and the management approach to it (including a comprehensive discussion of the known risks, side effects and potential benefits of treatment), the patient (family) agrees to implement the following specific plan:    --------------------------------------------------------------------------------------  YOUR PERSONALIZED ACNE ACTION PLAN     Mornin  Wash with over the counter Cerave Acne Foaming Cream cleanser with Benzoyl peroxide  This will bleach your towels  Will not bleach your skin  2  Start using prescribed Dapsone 7 5% gel to entire affected area   3  Follow with an oil-free sunscreen (SPF 30 or higher)  Some options include Neutrogena oil-free moisturizer with SPF      Night:     1  Wash your face with a gentle face wash - like Cetaphil wash, Cerave Hydrating , LaRoche Hydrating Cleanser   2   Pat dry your face, wait 15 mins and then apply a pea-sized amount of Epi-duo Forte, apply an even thin layer on your face  Can be drying  Start by using every other night and then build it up to every night  Avoid the eye area  - If this is not covered by insurance, you can get over the counter Differin Gel or LaRoche Adapalene 0 1% gel  You can apply an oil free moisturizer on top of this medicine to combat the dryness       Follow up 3-4 months    Make sure all products you use on face are labeled as "non-comedongenic" or "oil-free" or " does not clog pores"     Acne can be frustrating and difficult to treat  Most acne regimens take 2-3 months to see an improvement, so stick with them  Don't give up! As always, call your doctor if you have any concerns about your medications      ACNE:  WHAT ZIT ALL ABOUT? WHY DO I HAVE ACNE/PIMPLES? Your skin is made of layers  To keep the skin from becoming dry and cracked, the skin needs oil  The oil is made in little wells in the deeper layers in the skin  People with acne have glands that make more oil and are more easily plugged, causing the glands to swell  Hormones, bacteria and your inherited tendency to have acne all play a role  The medical term for pimples is acne or acne vulgaris (vulgaris means common)  Most people get some acne  Acne does not come from being dirty  Instead, it is an expected consequence of changes that occur during normal growth and development  Hormones, bacteria, and your family's tendency to have acne may all play a role  Whiteheads or blackheads are openings of the glands (glands are the oil factories) onto the surface of the skin  Blackheads are not caused by dirt blocking the pores; instead, they result from the oxidation reaction of oil and skin in the pores with the air (like a rust reaction)  WHAT ABOUT STRESS? Stress does not cause acne but it can make it worse  Make sure you get enough sleep and daily exercise! WHAT ABOUT FOODS/DIET?   Try to eat a balanced, healthy diet  Some people feel that certain foods worsen their acne  While there aren't many studies available on this question, severe dietary changes are unlikely to help your acne and may be harmful to the health of your skin  If you find that a certain food seems to aggravate your acne, you may consider avoiding that food  Discuss this with your physician! WHAT CAUSES MY ACNE? There are four contributors to acne--the body's natural oil (sebum), clogged pores, bacteria (with the scientific name Propionibacterium acnes, or P  acnes, for short), and the body's reaction to the bacteria living in the clogged pores (which causes inflammation)  Here's what happens:     Sebum is produced in the normal oil-making glands in the deeper layers of the skin and reaches the surface through the skin's pores  An increase in certain hormones occurs around the time of puberty, and these hormones trigger the oil glands to produce increased amounts of sebum   Pores with excess oil tend to become clogged more easily   At the same time, P  acnes--one of the many types of bacteria that normally live on everyone's skin--thrives in the excess oil and causes a skin reaction (inflammation)   If a pore is clogged close to the surface, there is little inflammation  However, this results in the formation of whiteheads (closed comedones) or blackheads (open comedones) at the surface of the skin   A plug that extends to, or forms a little deeper in the pore, or one that enlarges or ruptures may cause more inflammation  The result is red bumps (papules) and pus-filled pimples (pustules)   If plugging happens in the deepest skin layer, the inflammation may be even more severe, resulting in the formation of nodules or cysts  When these types of acne heal, they may leave behind discolored areas or true scars  SKIN HYGIENE:  HOW SHOULD I [facility] MY SKIN?   Acne does not come from being dirty, however, washing your face is part of taking good care of your skin and will help keep your face clear  Good skin hygiene is, therefore, critical to support any acne treatment plan  Here are several specific suggestions for practicing good skin hygiene and keeping your skin looking its best:     You should wash acne-prone skin TWICE A DAY: Once in the morning and once in the evening  This does include any showers you take that day, so do not overdo it!  Do not scrub the skin with a washcloth or loofah as these can irritate and inflame your acne  Acne does not come from dirt, so it is not necessary to scrub the skin clean  In fact, scrubbing may lead to dryness and irritation that makes the acne even worse and harder for patients to tolerate acne medications   Use a gentle facial moisturizing cleanser (Cetaphil Moisturizing Cleanser or Dove Fragrance-Free bar)  Avoid using soaps like Luís Jacques, [address], [address], or soft/liquid soaps as these products will dry your skin   Do not use any over-the-counter acne washes without your doctor's specific instruction to do so  These products often contain salicylic acid or benzoyl peroxide  These ingredients can be helpful in clearing oil from the skin and reducing bacteria, but they may also be drying and can add to irritation   Do not use exfoliating products with microbeads or brushes as these can cause irritation to the skin   Facials and other treatments to remove, squeeze, or clean out pores are not recommended  Manipulating the skin in this way can make acne worse and can lead to severe infections and/or scarring  It also increases the likelihood that the skin will not be able to tolerate acne medications   Try not to pop pimples or pick at your acne as this can delay healing and may result in scarring or skin color changes (dark spots) that are often more noticeable than the acne itself  Picking/popping acne can also cause a serious skin infection     Wash or change your pillow case once to twice a week, especially if you use products in your hair   Wash the skin as soon as possible after playing sports or other activities that cause a lot of sweating  Also, pay attention to how your sports equipment (shoulder pads, helmet strap, etc ) might be making your acne worse   When you use makeup, moisturizer, or sunscreen make sure that these products are labeled non-comedogenic, or won't clog pores, or won't cause acne         SHOULD I TREAT MY ACNE? There are a number of other skin conditions that can look like acne  If there is any question about the diagnosis, then the person should be evaluated by a board certified pediatric and adolescent dermatologist   A physician should examine any child with acne who is between the ages of 3and 9years of age, as acne in this mid-childhood age group is not normal and may signal an underlying problem  If a preadolescent (9to 6years of age) or adolescent (15to 25years of age) has mild acne and the condition is not bothersome to the individual, proper and regular skin care (what your doctor may call skin hygiene) may be all that is needed at this point  Many people do, however, need specific acne medications to help their skin look and feel its best  Your doctor will tell you if you are one of these people  If so, you may be advised to use an over-the-counter or prescription medication that is applied to the skin (a topical medication) or if the addition of an oral medication (a medication taken by [employer]) is needed  The good news is that the medications work well when used properly! Some specific factors that may influence the choice of acne therapy include:     Severity  The number and type of skin lesions (papules or comedones) and the degree of inflammation (mild, moderate or severe)   Scarring  Scarring is most common when acne is severe, but it can happen even in children with mild acne   Impact   If a child is experiencing emotional complications because of the acne or is experiencing negative comments from other children   Cost of the acne medications  An acne expert can help to keep out of pocket costs to a minimum by utilizing the correct medications and the least expensive options   The patient's skin type (oily versus dry or combination skin, for example)   Potential side effects of the medication   The ease or overall complexity of the treatment plan or medication  WHAT ACNE TREATMENTS ARE AVAILABLE? Medications for acne try to stop the formation of new pimples by reducing or removing the oil, bacteria, and other things (like dead skin cells) that clog the pores  They can also decrease the inflammation or irritation response of the skin to bacteria  It may take from 6 to 8 weeks (about 2 months!) before you see any improvement and know if the medication is effective  It takes the layers of skin this long to regenerate  Remember, these medications do not cure the condition--the acne improves because of the medication  Therefore, treatment must be continued in order to prevent the return of acne lesions  There are many types of acne treatments  Some are applied to the skin (topical medications) and some are taken by mouth (oral medications)  In most cases of mild acne, the doctor will start with a topical medication  There are many different topical medications that are helpful for acne  If acne is more severe and it does not respond adequately to a topical medication, or if it covers large body surface areas such as the back and/or chest, oral antibiotics such as Doxycycline or Minocycline and/or oral hormone therapy such as Oral Contraceptive Pills or Spironolactone may be prescribed  In the most severe cases, isotretinoin (Accutane) may be used       In general, it is usually best to start with acne medications that are least likely to cause side effects but are at the same time capable of addressing the specific causes for the acne  Some patients have a good result with just one medication, but many will need to use a combination of treatments: two or more different topical agents or an oral medication plus a topical medication  Another treatment used for acne may include corticosteroid injections, which are used to help relieve pain, decrease the size, and encourage the healing of large, inflamed acne nodules  Also, dermatologists sometimes perform acne surgery, using a fine needle, a pointed blade, or an instrument known as a comedone extractor to mechanically clean out clogged pores  One must always weigh the risk for inducing a scar with the potential benefits of any procedure  Prior treatment with topical retinoids can loosen whiteheads and blackheads and make it easier to physically remove such lesions  Heat-based devices, and light and laser therapy are being studied to see whether there is any role for such treatments in mild to moderate acne  At this time, there is not enough evidence to make general recommendations about their use  TOPICAL ACNE MEDICATIONS    WHAT KIND OF TOPICALS ARE THERE?  Benzoyl peroxide (BP) helps to fight inflammation and is anti-microbial (kills bacteria, viruses, and other microorganisms) and is believed to help prevent resistance of bacteria to topical antibiotics  A benzoyl peroxide wash may be recommended for use on large areas such as the chest and/or back  Mild irritation and dryness are common when first using benzoyl peroxide-containing products  Be careful because benzoyl peroxide can bleach towels and clothing!  Retinoids (such as adapalene, tretinoin, or tazarotene) unplug the oil glands by helping peel away the layers of skin and other things plugging the opening of the glands  Mild irritation and dryness are common when first using these products   Facial waxing and other skin procedures can lead to excessive irritation and should be avoided during retinoid therapy   Antibiotics fight bacteria and help decrease inflammation  Topical antibiotics commonly used in acne include clindamycin, erythromycin, and combination agents (such as clindamycin/benzoyl peroxide or erythromycin/benzoyl peroxide)  Mild irritation and dryness are common when first using these products  Typically, topical antibiotics should not be used alone as treatment for acne   Other topical agents include salicylic acid, azelaic acid, dapsone, and sulfacetamide  Mild irritation and dryness can also occur when first using these products  USING YOUR TOPICAL TREATMENTS LIKE A PRO   Apply topical medications only to clean, dry skin  Topical medications may lead to significant dryness of the affected areas  To minimize this, wait 15-20 minutes after washing before applying your topical medication   These medications work deep in the skin to prevent new breakouts  Spot treatment of individual pimples does not do much  When applying topical medications to the face, use the 5-dot method  Start by placing a small pea-sized amount of the medication on your finger  Then, place dots in each of five locations of your face: Mid-forehead, each cheek, nose, and chin  Next, rub the medication into the entire area of skin - not just on individual pimples! Try to avoid the delicate skin around your eyes and corners of your mouth   The medications are not magic! They take weeks if not months to work  Be patient and use your medicine on a daily basis or as directed for six weeks before asking if your skin looks better  Try not to miss more than one or two days each week when using your medications   If you are starting a new medication, then try using it every other night or even every third night   Gradually work up to [employer] a day    This will give your skin time to adjust    The same medications often come in various forms or formulations: Creams, ointments, lotions, gels, microspheres, or foams  Use the formulation that has been recommended and don't switch to other forms unless instructed  Some forms (such as alcohol based gels) may be more drying and less tolerable for certain skin types   Sometimes individual medications are not as effective as a combination of two or more agents  The doctor may need to try several medications or combinations before finding the one that is best for that patient   Moisturizer, sunscreen, and make-up may be used in conjunction with topical acne medications  In general, acne medications are applied first so they may directly contact the skin  Ask your physician to review specific application instructions!  It is especially important to always use sunscreen when using a topical retinoid or oral antibiotic  These drugs can make your skin more sensitive to the sun  In general, sunscreen gets applied AFTER any acne medications   Don't stop using your acne medications just because your acne got better  Remember, the acne is better because of the medication, and prevention is the key to treatment  HAVING PROBLEMS WITH ANY OF YOUR TREATMENTS? You should not be able to see any of the medicines on your face  If you can see a white film on your skin after you apply the medication, there is too much medicine in that area and you need to apply a thinner coat and make sure it is spread evenly on your face  If your skin gets too dry, you can apply a light (non-comedogenic) moisturizer on top of your medicine or you may switch to using the medicine every other day instead of every day  If your skin is still too irritated, you may need to switch to a milder medication  If your skin is red and very itchy, you may be allergic to the medication and you should stop using it  COMMON POSSIBLE SIDE EFFECTS OF MEDICATIONS     Retinoids - dryness, redness, increased sun sensitivity     Benzoyl peroxide - drying, redness, bleaching of clothes, towels and sheets, allergy  WHEN AND WHERE TO CALL WITH CONCERNS  We are here to help! If you experience any unusual symptoms, then stop taking or using the medication and call our office at [phone]  It is better to be safe than to be sorry!

## 2022-01-13 NOTE — PROGRESS NOTES
[facility] Follow Up Note    Patient Name: Gely Arboleda  Encounter Date: 01/13/22    Today's Chief Concerns:  [facility] Concern #1: Acne follow up      Current Medications:    Current Outpatient Medications:     atomoxetine (STRATTERA) 80 MG capsule, Take 1 capsule (80 mg total) by mouth daily, Disp: 30 capsule, Rfl: 2    cetirizine (ZyrTEC Allergy) 10 mg tablet, Take 10 mg by mouth daily, Disp: , Rfl:     Cholecalciferol (VITAMIN D-3) 5000 units TABS, Take 1 tablet daily, Disp: 1 tablet, Rfl: 0    Dapsone 7 5 % GEL, Apply topically to a face area daily  , Disp: 90 g, Rfl: 6    dicyclomine (BENTYL) 10 mg capsule, Take 1 capsule (10 mg total) by mouth every 8 (eight) hours as needed (Abdominal pain), Disp: 20 capsule, Rfl: 0    drospirenone-ethinyl estradiol (LAZARA) 3-0 02 MG per tablet, Take 1 tablet by mouth daily, Disp: 90 tablet, Rfl: 3    DULoxetine (CYMBALTA) 60 mg delayed release capsule, Take 1 capsule (60 mg total) by mouth daily, Disp: 30 capsule, Rfl: 2    Epiduo Forte 0 3-2 5 % GEL, Apply a pea size amount to the entire face once daily, Disp: 60 g, Rfl: 5    fexofenadine (ALLEGRA) 180 MG tablet, Take 180 mg by mouth daily, Disp: , Rfl:     hydrOXYzine HCL (ATARAX) 25 mg tablet, Take 2-3 tablets (50-75 mg total) by mouth daily at bedtime as needed (for anxiety or sleep), Disp: 90 tablet, Rfl: 4    ipratropium (ATROVENT) 0 03 % nasal spray, 1-2 sprays into each nostril 4 (four) times a day as needed, Disp: , Rfl:     MAGNESIUM PO, Take by mouth, Disp: , Rfl:     melatonin 3 mg, Take 5 mg by mouth, Disp: , Rfl:     omeprazole (PriLOSEC) 20 mg delayed release capsule, TAKE 2 CAPSULES BY MOUTH EVERY DAY, Disp: 60 capsule, Rfl: 5    tiZANidine (ZANAFLEX) 2 mg tablet, Take 1 tablet (2 mg total) by mouth daily at bedtime, Disp: 30 tablet, Rfl: 0    triamcinolone (KENALOG) 0 1 % cream, APPLY SMALL AMOUNT TWICE A DAY X 2 WEEKS TO THE B/L HANDS AND ELBOW THEN AS NEEDED FOR FLARES, Disp: , Rfl: 0    vitamin B-12 (VITAMIN B-12) 1,000 mcg tablet, Take 5,000 mcg by mouth daily, Disp: , Rfl:     famotidine (PEPCID) 10 mg tablet, Take 10 mg by mouth 2 (two) times a day as needed  (Patient not taking: Reported on 1/13/2022 ), Disp: , Rfl:     CONSTITUTIONAL:   Vitals:    01/13/22 1407   Temp: 97 5 °F (36 4 °C)   TempSrc: Temporal   Weight: 57 6 kg (127 lb)   Height: 5' 9" (1 753 m)     Specific Alerts:    Have you been seen by a [facility] Dermatologist in the last 3 years? YES    Are you pregnant or planning to become pregnant? No    Are you currently or planning to be nursing or breast feeding? No    Allergies   Allergen Reactions    Lexapro [Escitalopram]     Robaxin [Methocarbamol] GI Intolerance       May we call your Preferred Phone number to discuss your specific medical information? YES    May we leave a detailed message that includes your specific medical information? YES    Have you traveled outside of the [facility] in the past 3 months? No    Do you currently have a pacemaker or defibrillator? No    Do you have any artificial heart valves, joints, plates, screws, rods, stents, pins, etc? No   - If Yes, were any placed within the last 2 years? Do you require any medications prior to a surgical procedure? No    Are you taking any medications that cause you to bleed more easily ("blood thinners") No    Have you ever experienced a rapid heartbeat with epinephrine? No    Have you ever been treated with "gold" (gold sodium thiomalate) therapy? No    [facility] can help with wrinkles, "laugh lines," facial volume loss, "double chin," "love handles," age spots, and more  Are you interested in learning today about some of the skin enhancement procedures that we offer? (If Yes, please provide more detail) No    Review of Systems:  Have you recently had or currently have any of the following?     · Fever or chills: No  · Night Sweats: No  · Headaches: No  · Weight Gain: No  · Weight Loss: No  · Blurry Vision: No  · Nausea: No  · Vomiting: No  · Diarrhea: No  · Blood in Stool: No  · Abdominal Pain: No  · Itchy Skin: No  · Painful Joints: No  · Swollen Joints: No  · Muscle Pain: yes  · Irregular Mole: No  · Sun Burn: No  · Dry Skin: YES  · Skin Color Changes: No  · Scar or Keloid: No  · Cold Sores/Fever Blisters: No  · Bacterial Infections/MRSA: No  · Anxiety: YES  · Depression: No  · Suicidal or Homicidal Thoughts: No      PSYCH: Normal mood and affect  EYES: Normal conjunctiva  ENT: Normal lips and oral mucosa  CARDIOVASCULAR: No edema  RESPIRATORY: Normal respirations  HEME/LYMPH/IMMUNO:  No regional lymphadenopathy except as noted below in ASSESSMENT AND PLAN BY DIAGNOSIS    FOCUSED ORGAN SYSTEM SKIN EXAM (SKIN)    Face Normal except as noted below in Assessment       ACNE VULGARIS ("COMMON ACNE")    Physical Exam:   Psychiatric/Mood:   Anatomic Location Affected:  face   Morphological Description: see photos  o Open/Closed Comedones:  - Rare ("Almost Clear")  o Inflammatory Papules/Pustules:  - Few ("Mild")  o Nodules:  - No evidence ("Clear")  o Scarring:  - Several ("Moderate")  o Excoriations:  - No evidence ("Clear")  o Local Skin Redness/Erythema:  - Rare ("Almost Clear")  o Local Skin Dryness/Scaling:  - No evidence ("Clear")  o Local Skin Dyspigmentation:  - No evidence ("Clear")   Pertinent Positives:   Pertinent Negatives: Additional History of Present Condition:  Patient currently using Dapsone 7 5 % gel and Epiduo Forte along with a Benzoyl Peroxide 10% wash; patient used Accutane and Tretinoin in past broke out in really bad red rash on face  Patient also taking LAZARA  Has been doing microneedling and has done 10 sessions thus far in [address] [place] [address] at [facility]        Assessment and Plan:  Spironolactone risks and benefits discussed   We reviewed the causes of acne, the kinds of acne, and the expected clinical course     We discussed treatment options ranging from over-the-counter products, topical retinoids, antibiotics, BP, hormonal therapies (OCPs/spironolactone), and isotretinoin (Accutane)   We reviewed specific over-the-counter interventions and medications  Recommended typical hygiene measures including water-based facial products, washing regularly with mild cleanser, and refraining from picking and popping any pimples   Recommended non-comedogenic sunscreen use daily   Expectations of therapy discussed  Side effects, risks and benefits of medications discussed   A comprehensive handout on Acne was provided   The phone number to call in case of questions or concerns (and instructions to stop medications in such a scenario) was provided   After lengthy discussion of etiology and treatment options, we decided to implement the following personalized treatment plan:    Based on a thorough discussion of this condition and the management approach to it (including a comprehensive discussion of the known risks, side effects and potential benefits of treatment), the patient (family) agrees to implement the following specific plan:    --------------------------------------------------------------------------------------  YOUR PERSONALIZED ACNE ACTION PLAN     Mornin  Wash with over the counter Cerave Acne Foaming Cream cleanser with Benzoyl peroxide  This will bleach your towels  Will not bleach your skin  2  Start using prescribed Dapsone 7 5% gel to entire affected area   3  Follow with an oil-free sunscreen (SPF 30 or higher)  Some options include Neutrogena oil-free moisturizer with SPF      Night:     1  Wash your face with a gentle face wash - like Cetaphil wash, Cerave Hydrating , LaRoche Hydrating Cleanser   2  Pat dry your face, wait 15 mins and then apply a pea-sized amount of Epi-duo Forte, apply an even thin layer on your face  Can be drying  Start by using every other night and then build it up to every night   Avoid the eye area   - If this is not covered by insurance, you can get over the counter Differin Gel or LaRoche Adapalene 0 1% gel  You can apply an oil free moisturizer on top of this medicine to combat the dryness       Follow up 3-4 months    Make sure all products you use on face are labeled as "non-comedongenic" or "oil-free" or " does not clog pores"     Acne can be frustrating and difficult to treat  Most acne regimens take 2-3 months to see an improvement, so stick with them  Don't give up! As always, call your doctor if you have any concerns about your medications      ACNE:  WHAT ZIT ALL ABOUT? WHY DO I HAVE ACNE/PIMPLES? Your skin is made of layers  To keep the skin from becoming dry and cracked, the skin needs oil  The oil is made in little wells in the deeper layers in the skin  People with acne have glands that make more oil and are more easily plugged, causing the glands to swell  Hormones, bacteria and your inherited tendency to have acne all play a role  The medical term for pimples is acne or acne vulgaris (vulgaris means common)  Most people get some acne  Acne does not come from being dirty  Instead, it is an expected consequence of changes that occur during normal growth and development  Hormones, bacteria, and your family's tendency to have acne may all play a role  Whiteheads or blackheads are openings of the glands (glands are the oil factories) onto the surface of the skin  Blackheads are not caused by dirt blocking the pores; instead, they result from the oxidation reaction of oil and skin in the pores with the air (like a rust reaction)  WHAT ABOUT STRESS? Stress does not cause acne but it can make it worse  Make sure you get enough sleep and daily exercise! WHAT ABOUT FOODS/DIET? Try to eat a balanced, healthy diet  Some people feel that certain foods worsen their acne   While there aren't many studies available on this question, severe dietary changes are unlikely to help your acne and may be harmful to the health of your skin  If you find that a certain food seems to aggravate your acne, you may consider avoiding that food  Discuss this with your physician! WHAT CAUSES MY ACNE? There are four contributors to acne--the body's natural oil (sebum), clogged pores, bacteria (with the scientific name Propionibacterium acnes, or P  acnes, for short), and the body's reaction to the bacteria living in the clogged pores (which causes inflammation)  Here's what happens:     Sebum is produced in the normal oil-making glands in the deeper layers of the skin and reaches the surface through the skin's pores  An increase in certain hormones occurs around the time of puberty, and these hormones trigger the oil glands to produce increased amounts of sebum   Pores with excess oil tend to become clogged more easily   At the same time, P  acnes--one of the many types of bacteria that normally live on everyone's skin--thrives in the excess oil and causes a skin reaction (inflammation)   If a pore is clogged close to the surface, there is little inflammation  However, this results in the formation of whiteheads (closed comedones) or blackheads (open comedones) at the surface of the skin   A plug that extends to, or forms a little deeper in the pore, or one that enlarges or ruptures may cause more inflammation  The result is red bumps (papules) and pus-filled pimples (pustules)   If plugging happens in the deepest skin layer, the inflammation may be even more severe, resulting in the formation of nodules or cysts  When these types of acne heal, they may leave behind discolored areas or true scars  SKIN HYGIENE:  HOW SHOULD I [facility] MY SKIN? Acne does not come from being dirty, however, washing your face is part of taking good care of your skin and will help keep your face clear  Good skin hygiene is, therefore, critical to support any acne treatment plan    Here are several specific suggestions for practicing good skin hygiene and keeping your skin looking its best:     You should wash acne-prone skin TWICE A DAY: Once in the morning and once in the evening  This does include any showers you take that day, so do not overdo it!  Do not scrub the skin with a washcloth or loofah as these can irritate and inflame your acne  Acne does not come from dirt, so it is not necessary to scrub the skin clean  In fact, scrubbing may lead to dryness and irritation that makes the acne even worse and harder for patients to tolerate acne medications   Use a gentle facial moisturizing cleanser (Cetaphil Moisturizing Cleanser or Dove Fragrance-Free bar)  Avoid using soaps like Luís Jacques, Nevaeh Gomez [address], [facility], or soft/liquid soaps as these products will dry your skin   Do not use any over-the-counter acne washes without your doctor's specific instruction to do so  These products often contain salicylic acid or benzoyl peroxide  These ingredients can be helpful in clearing oil from the skin and reducing bacteria, but they may also be drying and can add to irritation   Do not use exfoliating products with microbeads or brushes as these can cause irritation to the skin   Facials and other treatments to remove, squeeze, or clean out pores are not recommended  Manipulating the skin in this way can make acne worse and can lead to severe infections and/or scarring  It also increases the likelihood that the skin will not be able to tolerate acne medications   Try not to pop pimples or pick at your acne as this can delay healing and may result in scarring or skin color changes (dark spots) that are often more noticeable than the acne itself  Picking/popping acne can also cause a serious skin infection   Wash or change your pillow case once to twice a week, especially if you use products in your hair      Wash the skin as soon as possible after playing sports or other activities that cause a lot of sweating  Also, pay attention to how your sports equipment (shoulder pads, helmet strap, etc ) might be making your acne worse   When you use makeup, moisturizer, or sunscreen make sure that these products are labeled non-comedogenic, or won't clog pores, or won't cause acne         SHOULD I TREAT MY ACNE? There are a number of other skin conditions that can look like acne  If there is any question about the diagnosis, then the person should be evaluated by a board certified pediatric and adolescent dermatologist   A physician should examine any child with acne who is between the ages of 3and 9years of age, as acne in this mid-childhood age group is not normal and may signal an underlying problem  If a preadolescent (9to 6years of age) or adolescent (15to 25years of age) has mild acne and the condition is not bothersome to the individual, proper and regular skin care (what your doctor may call skin hygiene) may be all that is needed at this point  Many people do, however, need specific acne medications to help their skin look and feel its best  Your doctor will tell you if you are one of these people  If so, you may be advised to use an over-the-counter or prescription medication that is applied to the skin (a topical medication) or if the addition of an oral medication (a medication taken by [employer]) is needed  The good news is that the medications work well when used properly! Some specific factors that may influence the choice of acne therapy include:     Severity  The number and type of skin lesions (papules or comedones) and the degree of inflammation (mild, moderate or severe)   Scarring  Scarring is most common when acne is severe, but it can happen even in children with mild acne   Impact  If a child is experiencing emotional complications because of the acne or is experiencing negative comments from other children     Cost of the acne medications  An acne expert can help to keep out of pocket costs to a minimum by utilizing the correct medications and the least expensive options   The patient's skin type (oily versus dry or combination skin, for example)   Potential side effects of the medication   The ease or overall complexity of the treatment plan or medication  WHAT ACNE TREATMENTS ARE AVAILABLE? Medications for acne try to stop the formation of new pimples by reducing or removing the oil, bacteria, and other things (like dead skin cells) that clog the pores  They can also decrease the inflammation or irritation response of the skin to bacteria  It may take from 6 to 8 weeks (about 2 months!) before you see any improvement and know if the medication is effective  It takes the layers of skin this long to regenerate  Remember, these medications do not cure the condition--the acne improves because of the medication  Therefore, treatment must be continued in order to prevent the return of acne lesions  There are many types of acne treatments  Some are applied to the skin (topical medications) and some are taken by mouth (oral medications)  In most cases of mild acne, the doctor will start with a topical medication  There are many different topical medications that are helpful for acne  If acne is more severe and it does not respond adequately to a topical medication, or if it covers large body surface areas such as the back and/or chest, oral antibiotics such as Doxycycline or Minocycline and/or oral hormone therapy such as Oral Contraceptive Pills or Spironolactone may be prescribed  In the most severe cases, isotretinoin (Accutane) may be used  In general, it is usually best to start with acne medications that are least likely to cause side effects but are at the same time capable of addressing the specific causes for the acne   Some patients have a good result with just one medication, but many will need to use a combination of treatments: two or more different topical agents or an oral medication plus a topical medication  Another treatment used for acne may include corticosteroid injections, which are used to help relieve pain, decrease the size, and encourage the healing of large, inflamed acne nodules  Also, dermatologists sometimes perform acne surgery, using a fine needle, a pointed blade, or an instrument known as a comedone extractor to mechanically clean out clogged pores  One must always weigh the risk for inducing a scar with the potential benefits of any procedure  Prior treatment with topical retinoids can loosen whiteheads and blackheads and make it easier to physically remove such lesions  Heat-based devices, and light and laser therapy are being studied to see whether there is any role for such treatments in mild to moderate acne  At this time, there is not enough evidence to make general recommendations about their use  TOPICAL ACNE MEDICATIONS    WHAT KIND OF TOPICALS ARE THERE?  Benzoyl peroxide (BP) helps to fight inflammation and is anti-microbial (kills bacteria, viruses, and other microorganisms) and is believed to help prevent resistance of bacteria to topical antibiotics  A benzoyl peroxide wash may be recommended for use on large areas such as the chest and/or back  Mild irritation and dryness are common when first using benzoyl peroxide-containing products  Be careful because benzoyl peroxide can bleach towels and clothing!  Retinoids (such as adapalene, tretinoin, or tazarotene) unplug the oil glands by helping peel away the layers of skin and other things plugging the opening of the glands  Mild irritation and dryness are common when first using these products  Facial waxing and other skin procedures can lead to excessive irritation and should be avoided during retinoid therapy   Antibiotics fight bacteria and help decrease inflammation   Topical antibiotics commonly used in acne include clindamycin, erythromycin, and combination agents (such as clindamycin/benzoyl peroxide or erythromycin/benzoyl peroxide)  Mild irritation and dryness are common when first using these products  Typically, topical antibiotics should not be used alone as treatment for acne   Other topical agents include salicylic acid, azelaic acid, dapsone, and sulfacetamide  Mild irritation and dryness can also occur when first using these products  USING YOUR TOPICAL TREATMENTS LIKE A PRO   Apply topical medications only to clean, dry skin  Topical medications may lead to significant dryness of the affected areas  To minimize this, wait 15-20 minutes after washing before applying your topical medication   These medications work deep in the skin to prevent new breakouts  Spot treatment of individual pimples does not do much  When applying topical medications to the face, use the 5-dot method  Start by placing a small pea-sized amount of the medication on your finger  Then, place dots in each of five locations of your face: Mid-forehead, each cheek, nose, and chin  Next, rub the medication into the entire area of skin - not just on individual pimples! Try to avoid the delicate skin around your eyes and corners of your mouth   The medications are not magic! They take weeks if not months to work  Be patient and use your medicine on a daily basis or as directed for six weeks before asking if your skin looks better  Try not to miss more than one or two days each week when using your medications   If you are starting a new medication, then try using it every other night or even every third night   Gradually work up to [employer] a day    This will give your skin time to adjust    The same medications often come in various forms or formulations: Creams, ointments, lotions, gels, microspheres, or foams  Use the formulation that has been recommended and don't switch to other forms unless instructed  Some forms (such as alcohol based gels) may be more drying and less tolerable for certain skin types   Sometimes individual medications are not as effective as a combination of two or more agents  The doctor may need to try several medications or combinations before finding the one that is best for that patient   Moisturizer, sunscreen, and make-up may be used in conjunction with topical acne medications  In general, acne medications are applied first so they may directly contact the skin  Ask your physician to review specific application instructions!  It is especially important to always use sunscreen when using a topical retinoid or oral antibiotic  These drugs can make your skin more sensitive to the sun  In general, sunscreen gets applied AFTER any acne medications   Don't stop using your acne medications just because your acne got better  Remember, the acne is better because of the medication, and prevention is the key to treatment  HAVING PROBLEMS WITH ANY OF YOUR TREATMENTS? You should not be able to see any of the medicines on your face  If you can see a white film on your skin after you apply the medication, there is too much medicine in that area and you need to apply a thinner coat and make sure it is spread evenly on your face  If your skin gets too dry, you can apply a light (non-comedogenic) moisturizer on top of your medicine or you may switch to using the medicine every other day instead of every day  If your skin is still too irritated, you may need to switch to a milder medication  If your skin is red and very itchy, you may be allergic to the medication and you should stop using it  COMMON POSSIBLE SIDE EFFECTS OF MEDICATIONS     Retinoids - dryness, redness, increased sun sensitivity   Benzoyl peroxide - drying, redness, bleaching of clothes, towels and sheets, allergy  WHEN AND WHERE TO CALL WITH CONCERNS  We are here to help!   If you experience any unusual symptoms, then stop taking or using the medication and call our office at [phone] (SKIN)  It is better to be safe than to be sorry!             Argelia Soto MD  Scribe Attestation    I,:  Ayaz Yadav am acting as a scribe while in the presence of the attending physician :       I,:  Antonio Cheema MD personally performed the services described in this documentation    as scribed in my presence :

## 2022-01-18 NOTE — TELEPHONE ENCOUNTER
[facility] Follow Up Note    Patient Name: Grace Mesa  Encounter Date: 01/13/22    Today's Chief Concerns:  [facility] Concern #1: Acne follow up      Current Medications:    Current Outpatient Medications:     atomoxetine (STRATTERA) 80 MG capsule, Take 1 capsule (80 mg total) by mouth daily, Disp: 30 capsule, Rfl: 2    cetirizine (ZyrTEC Allergy) 10 mg tablet, Take 10 mg by mouth daily, Disp: , Rfl:     Cholecalciferol (VITAMIN D-3) 5000 units TABS, Take 1 tablet daily, Disp: 1 tablet, Rfl: 0    Dapsone 7 5 % GEL, Apply topically to a face area daily  , Disp: 90 g, Rfl: 6    dicyclomine (BENTYL) 10 mg capsule, Take 1 capsule (10 mg total) by mouth every 8 (eight) hours as needed (Abdominal pain), Disp: 20 capsule, Rfl: 0    drospirenone-ethinyl estradiol (LAZARA) 3-0 02 MG per tablet, Take 1 tablet by mouth daily, Disp: 90 tablet, Rfl: 3    DULoxetine (CYMBALTA) 60 mg delayed release capsule, Take 1 capsule (60 mg total) by mouth daily, Disp: 30 capsule, Rfl: 2    Epiduo Forte 0 3-2 5 % GEL, Apply a pea size amount to the entire face once daily, Disp: 60 g, Rfl: 5    famotidine (PEPCID) 10 mg tablet, Take 10 mg by mouth 2 (two) times a day as needed  (Patient not taking: Reported on 1/13/2022 ), Disp: , Rfl:     fexofenadine (ALLEGRA) 180 MG tablet, Take 180 mg by mouth daily, Disp: , Rfl:     hydrOXYzine HCL (ATARAX) 25 mg tablet, Take 2-3 tablets (50-75 mg total) by mouth daily at bedtime as needed (for anxiety or sleep), Disp: 90 tablet, Rfl: 4    ipratropium (ATROVENT) 0 03 % nasal spray, 1-2 sprays into each nostril 4 (four) times a day as needed, Disp: , Rfl:     MAGNESIUM PO, Take by mouth, Disp: , Rfl:     melatonin 3 mg, Take 5 mg by mouth, Disp: , Rfl:     omeprazole (PriLOSEC) 20 mg delayed release capsule, TAKE 2 CAPSULES BY MOUTH EVERY DAY, Disp: 60 capsule, Rfl: 5    tiZANidine (ZANAFLEX) 2 mg tablet, Take 1 tablet (2 mg total) by mouth daily at bedtime, Disp: 30 tablet, Rfl: 0    triamcinolone (KENALOG) 0 1 % cream, APPLY SMALL AMOUNT TWICE A DAY X 2 WEEKS TO THE B/L HANDS AND ELBOW THEN AS NEEDED FOR FLARES, Disp: , Rfl: 0    vitamin B-12 (VITAMIN B-12) 1,000 mcg tablet, Take 5,000 mcg by mouth daily, Disp: , Rfl:     CONSTITUTIONAL:   Vitals:    01/13/22 1407   Temp: 97 5 °F (36 4 °C)   TempSrc: Temporal   Weight: 57 6 kg (127 lb)   Height: 5' 9" (1 753 m)     Specific Alerts:    Have you been seen by a [facility] Dermatologist in the last 3 years? YES    Are you pregnant or planning to become pregnant? No    Are you currently or planning to be nursing or breast feeding? No    Allergies   Allergen Reactions    Lexapro [Escitalopram]     Robaxin [Methocarbamol] GI Intolerance       May we call your Preferred Phone number to discuss your specific medical information? YES    May we leave a detailed message that includes your specific medical information? YES    Have you traveled outside of the [facility] in the past 3 months? No    Do you currently have a pacemaker or defibrillator? No    Do you have any artificial heart valves, joints, plates, screws, rods, stents, pins, etc? No   - If Yes, were any placed within the last 2 years? Do you require any medications prior to a surgical procedure? No    Are you taking any medications that cause you to bleed more easily ("blood thinners") No    Have you ever experienced a rapid heartbeat with epinephrine? No    Have you ever been treated with "gold" (gold sodium thiomalate) therapy? No    [address]  Dermatology can help with wrinkles, "laugh lines," facial volume loss, "double chin," "love handles," age spots, and more  Are you interested in learning today about some of the skin enhancement procedures that we offer? (If Yes, please provide more detail) No    Review of Systems:  Have you recently had or currently have any of the following?     · Fever or chills: No  · Night Sweats: No  · Headaches: No  · Weight Gain: No  · Weight Loss: No  · Blurry Vision: No  · Nausea: No  · Vomiting: No  · Diarrhea: No  · Blood in Stool: No  · Abdominal Pain: No  · Itchy Skin: No  · Painful Joints: No  · Swollen Joints: No  · Muscle Pain: yes  · Irregular Mole: No  · Sun Burn: No  · Dry Skin: YES  · Skin Color Changes: No  · Scar or Keloid: No  · Cold Sores/Fever Blisters: No  · Bacterial Infections/MRSA: No  · Anxiety: YES  · Depression: No  · Suicidal or Homicidal Thoughts: No      PSYCH: Normal mood and affect  EYES: Normal conjunctiva  ENT: Normal lips and oral mucosa  CARDIOVASCULAR: No edema  RESPIRATORY: Normal respirations  HEME/LYMPH/IMMUNO:  No regional lymphadenopathy except as noted below in ASSESSMENT AND PLAN BY DIAGNOSIS    FOCUSED ORGAN SYSTEM SKIN EXAM (SKIN)    Face Normal except as noted below in Assessment       ACNE VULGARIS ("COMMON ACNE")    Physical Exam:   Psychiatric/Mood:   Anatomic Location Affected:  face   Morphological Description: see photos  o Open/Closed Comedones:  - Rare ("Almost Clear")  o Inflammatory Papules/Pustules:  - Few ("Mild")  o Nodules:  - No evidence ("Clear")  o Scarring:  - Several ("Moderate")  o Excoriations:  - No evidence ("Clear")  o Local Skin Redness/Erythema:  - Rare ("Almost Clear")  o Local Skin Dryness/Scaling:  - No evidence ("Clear")  o Local Skin Dyspigmentation:  - No evidence ("Clear")   Pertinent Positives:   Pertinent Negatives: Additional History of Present Condition:  Patient currently using Dapsone 7 5 % gel and Epiduo Forte along with a Benzoyl Peroxide 10% wash; patient used Accutane and Tretinoin in past broke out in really bad red rash on face  Patient also taking LAZARA  Has been doing microneedling and has done 10 sessions thus far in [facility]        Assessment and Plan:  Spironolactone risks and benefits discussed   We reviewed the causes of acne, the kinds of acne, and the expected clinical course     We discussed treatment options ranging from over-the-counter products, topical retinoids, antibiotics, BP, hormonal therapies (OCPs/spironolactone), and isotretinoin (Accutane)   We reviewed specific over-the-counter interventions and medications  Recommended typical hygiene measures including water-based facial products, washing regularly with mild cleanser, and refraining from picking and popping any pimples   Recommended non-comedogenic sunscreen use daily   Expectations of therapy discussed  Side effects, risks and benefits of medications discussed   A comprehensive handout on Acne was provided   The phone number to call in case of questions or concerns (and instructions to stop medications in such a scenario) was provided   After lengthy discussion of etiology and treatment options, we decided to implement the following personalized treatment plan:    Based on a thorough discussion of this condition and the management approach to it (including a comprehensive discussion of the known risks, side effects and potential benefits of treatment), the patient (family) agrees to implement the following specific plan:    --------------------------------------------------------------------------------------  YOUR PERSONALIZED ACNE ACTION PLAN     Mornin  Wash with over the counter Cerave Acne Foaming Cream cleanser with Benzoyl peroxide  This will bleach your towels  Will not bleach your skin  2  Start using prescribed Dapsone 7 5% gel to entire affected area   3  Follow with an oil-free sunscreen (SPF 30 or higher)  Some options include Neutrogena oil-free moisturizer with SPF      Night:     1  Wash your face with a gentle face wash - like Cetaphil wash, Cerave Hydrating , LaRoche Hydrating Cleanser   2  Pat dry your face, wait 15 mins and then apply a pea-sized amount of Epi-duo Forte, apply an even thin layer on your face  Can be drying  Start by using every other night and then build it up to every night   Avoid the eye area  - If this is not covered by insurance, you can get over the counter Differin Gel or LaRoche Adapalene 0 1% gel  You can apply an oil free moisturizer on top of this medicine to combat the dryness       Follow up 3-4 months    Make sure all products you use on face are labeled as "non-comedongenic" or "oil-free" or " does not clog pores"     Acne can be frustrating and difficult to treat  Most acne regimens take 2-3 months to see an improvement, so stick with them  Don't give up! As always, call your doctor if you have any concerns about your medications      ACNE:  WHAT ZIT ALL ABOUT? WHY DO I HAVE ACNE/PIMPLES? Your skin is made of layers  To keep the skin from becoming dry and cracked, the skin needs oil  The oil is made in little wells in the deeper layers in the skin  People with acne have glands that make more oil and are more easily plugged, causing the glands to swell  Hormones, bacteria and your inherited tendency to have acne all play a role  The medical term for pimples is acne or acne vulgaris (vulgaris means common)  Most people get some acne  Acne does not come from being dirty  Instead, it is an expected consequence of changes that occur during normal growth and development  Hormones, bacteria, and your family's tendency to have acne may all play a role  Whiteheads or blackheads are openings of the glands (glands are the oil factories) onto the surface of the skin  Blackheads are not caused by dirt blocking the pores; instead, they result from the oxidation reaction of oil and skin in the pores with the air (like a rust reaction)  WHAT ABOUT STRESS? Stress does not cause acne but it can make it worse  Make sure you get enough sleep and daily exercise! WHAT ABOUT FOODS/DIET? Try to eat a balanced, healthy diet  Some people feel that certain foods worsen their acne   While there aren't many studies available on this question, severe dietary changes are unlikely to help your acne and may be harmful to the health of your skin  If you find that a certain food seems to aggravate your acne, you may consider avoiding that food  Discuss this with your physician! WHAT CAUSES MY ACNE? There are four contributors to acne--the body's natural oil (sebum), clogged pores, bacteria (with the scientific name Propionibacterium acnes, or P  acnes, for short), and the body's reaction to the bacteria living in the clogged pores (which causes inflammation)  Here's what happens:     Sebum is produced in the normal oil-making glands in the deeper layers of the skin and reaches the surface through the skin's pores  An increase in certain hormones occurs around the time of puberty, and these hormones trigger the oil glands to produce increased amounts of sebum   Pores with excess oil tend to become clogged more easily   At the same time, P  acnes--one of the many types of bacteria that normally live on everyone's skin--thrives in the excess oil and causes a skin reaction (inflammation)   If a pore is clogged close to the surface, there is little inflammation  However, this results in the formation of whiteheads (closed comedones) or blackheads (open comedones) at the surface of the skin   A plug that extends to, or forms a little deeper in the pore, or one that enlarges or ruptures may cause more inflammation  The result is red bumps (papules) and pus-filled pimples (pustules)   If plugging happens in the deepest skin layer, the inflammation may be even more severe, resulting in the formation of nodules or cysts  When these types of acne heal, they may leave behind discolored areas or true scars  SKIN HYGIENE:  HOW SHOULD I [facility] MY SKIN? Acne does not come from being dirty, however, washing your face is part of taking good care of your skin and will help keep your face clear  Good skin hygiene is, therefore, critical to support any acne treatment plan    Here are several specific suggestions for practicing good skin hygiene and keeping your skin looking its best:     You should wash acne-prone skin TWICE A DAY: Once in the morning and once in the evening  This does include any showers you take that day, so do not overdo it!  Do not scrub the skin with a washcloth or loofah as these can irritate and inflame your acne  Acne does not come from dirt, so it is not necessary to scrub the skin clean  In fact, scrubbing may lead to dryness and irritation that makes the acne even worse and harder for patients to tolerate acne medications   Use a gentle facial moisturizing cleanser (Cetaphil Moisturizing Cleanser or Dove Fragrance-Free bar)  Avoid using soaps like Luís Jacques, Nevaeh [address], [facility], or soft/liquid soaps as these products will dry your skin   Do not use any over-the-counter acne washes without your doctor's specific instruction to do so  These products often contain salicylic acid or benzoyl peroxide  These ingredients can be helpful in clearing oil from the skin and reducing bacteria, but they may also be drying and can add to irritation   Do not use exfoliating products with microbeads or brushes as these can cause irritation to the skin   Facials and other treatments to remove, squeeze, or clean out pores are not recommended  Manipulating the skin in this way can make acne worse and can lead to severe infections and/or scarring  It also increases the likelihood that the skin will not be able to tolerate acne medications   Try not to pop pimples or pick at your acne as this can delay healing and may result in scarring or skin color changes (dark spots) that are often more noticeable than the acne itself  Picking/popping acne can also cause a serious skin infection   Wash or change your pillow case once to twice a week, especially if you use products in your hair      Wash the skin as soon as possible after playing sports or other activities that cause a lot of sweating  Also, pay attention to how your sports equipment (shoulder pads, helmet strap, etc ) might be making your acne worse   When you use makeup, moisturizer, or sunscreen make sure that these products are labeled non-comedogenic, or won't clog pores, or won't cause acne         SHOULD I TREAT MY ACNE? There are a number of other skin conditions that can look like acne  If there is any question about the diagnosis, then the person should be evaluated by a board certified pediatric and adolescent dermatologist   A physician should examine any child with acne who is between the ages of 3and 9years of age, as acne in this mid-childhood age group is not normal and may signal an underlying problem  If a preadolescent (9to 6years of age) or adolescent (15to 25years of age) has mild acne and the condition is not bothersome to the individual, proper and regular skin care (what your doctor may call skin hygiene) may be all that is needed at this point  Many people do, however, need specific acne medications to help their skin look and feel its best  Your doctor will tell you if you are one of these people  If so, you may be advised to use an over-the-counter or prescription medication that is applied to the skin (a topical medication) or if the addition of an oral medication (a medication taken by [employer]) is needed  The good news is that the medications work well when used properly! Some specific factors that may influence the choice of acne therapy include:     Severity  The number and type of skin lesions (papules or comedones) and the degree of inflammation (mild, moderate or severe)   Scarring  Scarring is most common when acne is severe, but it can happen even in children with mild acne   Impact  If a child is experiencing emotional complications because of the acne or is experiencing negative comments from other children     Cost of the acne medications  An acne expert can help to keep out of pocket costs to a minimum by utilizing the correct medications and the least expensive options   The patient's skin type (oily versus dry or combination skin, for example)   Potential side effects of the medication   The ease or overall complexity of the treatment plan or medication  WHAT ACNE TREATMENTS ARE AVAILABLE? Medications for acne try to stop the formation of new pimples by reducing or removing the oil, bacteria, and other things (like dead skin cells) that clog the pores  They can also decrease the inflammation or irritation response of the skin to bacteria  It may take from 6 to 8 weeks (about 2 months!) before you see any improvement and know if the medication is effective  It takes the layers of skin this long to regenerate  Remember, these medications do not cure the condition--the acne improves because of the medication  Therefore, treatment must be continued in order to prevent the return of acne lesions  There are many types of acne treatments  Some are applied to the skin (topical medications) and some are taken by mouth (oral medications)  In most cases of mild acne, the doctor will start with a topical medication  There are many different topical medications that are helpful for acne  If acne is more severe and it does not respond adequately to a topical medication, or if it covers large body surface areas such as the back and/or chest, oral antibiotics such as Doxycycline or Minocycline and/or oral hormone therapy such as Oral Contraceptive Pills or Spironolactone may be prescribed  In the most severe cases, isotretinoin (Accutane) may be used  In general, it is usually best to start with acne medications that are least likely to cause side effects but are at the same time capable of addressing the specific causes for the acne   Some patients have a good result with just one medication, but many will need to use a combination of treatments: two or more different topical agents or an oral medication plus a topical medication  Another treatment used for acne may include corticosteroid injections, which are used to help relieve pain, decrease the size, and encourage the healing of large, inflamed acne nodules  Also, dermatologists sometimes perform acne surgery, using a fine needle, a pointed blade, or an instrument known as a comedone extractor to mechanically clean out clogged pores  One must always weigh the risk for inducing a scar with the potential benefits of any procedure  Prior treatment with topical retinoids can loosen whiteheads and blackheads and make it easier to physically remove such lesions  Heat-based devices, and light and laser therapy are being studied to see whether there is any role for such treatments in mild to moderate acne  At this time, there is not enough evidence to make general recommendations about their use  TOPICAL ACNE MEDICATIONS    WHAT KIND OF TOPICALS ARE THERE?  Benzoyl peroxide (BP) helps to fight inflammation and is anti-microbial (kills bacteria, viruses, and other microorganisms) and is believed to help prevent resistance of bacteria to topical antibiotics  A benzoyl peroxide wash may be recommended for use on large areas such as the chest and/or back  Mild irritation and dryness are common when first using benzoyl peroxide-containing products  Be careful because benzoyl peroxide can bleach towels and clothing!  Retinoids (such as adapalene, tretinoin, or tazarotene) unplug the oil glands by helping peel away the layers of skin and other things plugging the opening of the glands  Mild irritation and dryness are common when first using these products  Facial waxing and other skin procedures can lead to excessive irritation and should be avoided during retinoid therapy   Antibiotics fight bacteria and help decrease inflammation   Topical antibiotics commonly used in acne include clindamycin, erythromycin, and combination agents (such as clindamycin/benzoyl peroxide or erythromycin/benzoyl peroxide)  Mild irritation and dryness are common when first using these products  Typically, topical antibiotics should not be used alone as treatment for acne   Other topical agents include salicylic acid, azelaic acid, dapsone, and sulfacetamide  Mild irritation and dryness can also occur when first using these products  USING YOUR TOPICAL TREATMENTS LIKE A PRO   Apply topical medications only to clean, dry skin  Topical medications may lead to significant dryness of the affected areas  To minimize this, wait 15-20 minutes after washing before applying your topical medication   These medications work deep in the skin to prevent new breakouts  Spot treatment of individual pimples does not do much  When applying topical medications to the face, use the 5-dot method  Start by placing a small pea-sized amount of the medication on your finger  Then, place dots in each of five locations of your face: Mid-forehead, each cheek, nose, and chin  Next, rub the medication into the entire area of skin - not just on individual pimples! Try to avoid the delicate skin around your eyes and corners of your mouth   The medications are not magic! They take weeks if not months to work  Be patient and use your medicine on a daily basis or as directed for six weeks before asking if your skin looks better  Try not to miss more than one or two days each week when using your medications   If you are starting a new medication, then try using it every other night or even every third night   Gradually work up to [employer] a day    This will give your skin time to adjust    The same medications often come in various forms or formulations: Creams, ointments, lotions, gels, microspheres, or foams  Use the formulation that has been recommended and don't switch to other forms unless instructed  Some forms (such as alcohol based gels) may be more drying and less tolerable for certain skin types   Sometimes individual medications are not as effective as a combination of two or more agents  The doctor may need to try several medications or combinations before finding the one that is best for that patient   Moisturizer, sunscreen, and make-up may be used in conjunction with topical acne medications  In general, acne medications are applied first so they may directly contact the skin  Ask your physician to review specific application instructions!  It is especially important to always use sunscreen when using a topical retinoid or oral antibiotic  These drugs can make your skin more sensitive to the sun  In general, sunscreen gets applied AFTER any acne medications   Don't stop using your acne medications just because your acne got better  Remember, the acne is better because of the medication, and prevention is the key to treatment  HAVING PROBLEMS WITH ANY OF YOUR TREATMENTS? You should not be able to see any of the medicines on your face  If you can see a white film on your skin after you apply the medication, there is too much medicine in that area and you need to apply a thinner coat and make sure it is spread evenly on your face  If your skin gets too dry, you can apply a light (non-comedogenic) moisturizer on top of your medicine or you may switch to using the medicine every other day instead of every day  If your skin is still too irritated, you may need to switch to a milder medication  If your skin is red and very itchy, you may be allergic to the medication and you should stop using it  COMMON POSSIBLE SIDE EFFECTS OF MEDICATIONS     Retinoids - dryness, redness, increased sun sensitivity   Benzoyl peroxide - drying, redness, bleaching of clothes, towels and sheets, allergy  WHEN AND WHERE TO CALL WITH CONCERNS  We are here to help!   If you experience any unusual symptoms, then stop taking or using the medication and call our office at [phone] (SKIN)  It is better to be safe than to be sorry!             Rad Mercado MD  Scribe Attestation    I,:   am acting as a scribe while in the presence of the attending physician :       I,:   personally performed the services described in this documentation    as scribed in my presence :

## 2022-01-19 ENCOUNTER — OFFICE VISIT (OUTPATIENT)
Dept: GASTROENTEROLOGY | Facility: CLINIC | Age: 27
End: 2022-01-19
Payer: COMMERCIAL

## 2022-01-19 ENCOUNTER — TELEPHONE (OUTPATIENT)
Dept: DERMATOLOGY | Facility: CLINIC | Age: 27
End: 2022-01-19

## 2022-01-19 VITALS
HEIGHT: 69 IN | TEMPERATURE: 98.8 F | DIASTOLIC BLOOD PRESSURE: 60 MMHG | SYSTOLIC BLOOD PRESSURE: 104 MMHG | WEIGHT: 128 LBS | BODY MASS INDEX: 18.96 KG/M2

## 2022-01-19 DIAGNOSIS — R14.0 BLOATING: ICD-10-CM

## 2022-01-19 DIAGNOSIS — R10.30 LOWER ABDOMINAL PAIN: ICD-10-CM

## 2022-01-19 DIAGNOSIS — K59.00 CONSTIPATION, UNSPECIFIED CONSTIPATION TYPE: Primary | ICD-10-CM

## 2022-01-19 DIAGNOSIS — K12.0 APHTHOUS ULCER: ICD-10-CM

## 2022-01-19 PROCEDURE — 99243 OFF/OP CNSLTJ NEW/EST LOW 30: CPT | Performed by: PHYSICIAN ASSISTANT

## 2022-01-19 RX ORDER — AZITHROMYCIN 500 MG/1
TABLET, FILM COATED ORAL
COMMUNITY
Start: 2022-01-06 | End: 2022-04-22 | Stop reason: ALTCHOICE

## 2022-01-19 NOTE — LETTER
January 19, 2022     Jennifer Gage MD  [address] [place] [id number]    Patient: Ghada Mendez   YOB: 1995   Date of Visit: 1/19/2022       Dear Dr Nicole Pruitt: Thank you for referring Bari Chapman to me for evaluation  Below are my notes for this consultation  If you have questions, please do not hesitate to call me  I look forward to following your patient along with you  Sincerely,        Maria Guadalupe Jackson PA-C        CC: ANTIONETTE Luis PA-C  1/19/2022  3:23 PM  Sign when Signing Visit  [facility] - Outpatient Consultation  Ghada Mendez 32 y o  female MRN: [id number]  Encounter: [id number]          ASSESSMENT AND PLAN:      1  Aphthous ulcer  2  Lower abdominal pain  3  Constipation, unspecified  4  Bloating  She was referred by her dentist due to recurrent aphthous ulcers  She also has chronic lower abdominal pain, reflux symptoms, bloating, constipation   I explained aphthous ulcers can be related to nutritional deficiencies (B12 deficiency) and can also be seen with systemic disease such as celiac disease, inflammatory bowel disease, Behcet syndrome, etc  Fortunately, she denies alarm symptoms such as blood in the stool, abnormal weight loss, family history of GI disease/malignancy       -We agree to start with non-invasive work-up especially since she is young  -I agree with checking basic blood work including CBC, CMP, TSH, B12, vitamin D   -Check celiac serologies and inflammatory markers    -Check H pylori stool antigen off PPI for 2 weeks (she can take Pepcid daily in the meantime)  -If work-up is abnormal, would recommend endoscopic evaluation   -Continue omeprazole 20 mg daily  -Start MiraLAX 17 g by mouth daily for constipation - gave instructions for how to titrate MiraLAX   -Gave information on high fiber diet  -Drink 64 oz of water daily    - Ambulatory referral to Gastroenterology  - Celiac Disease Antibody Profile; Future  - C-reactive protein; Future  - Calprotectin,Fecal  - H  pylori antigen, stool; Future    Follow-up in 6 weeks to re-evaluate symptoms  ______________________________________________________________________    HPI:  45-year-old female with history of depression, allergic rhinitis, chronic low back pain, elevated cholesterol, vitamin-D and B12 deficiency referred for history of aphthous ulcers, lower abdominal pain  She has had recurrent aphthous ulcers for years  These were initially intermittent but she has had 3 or 4 ulcers over the past month  Her dentist was concerned about nutritional deficiency, so he recommended seeing GI  She does report multiple GI symptoms including reflux, nausea, gas, bloating, abdominal pain, new constipation  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  She has been constipated since  She moves her bowels once every other day, on average  The stool is bulky and hard in consistency  Sometimes she feels the tear and will notice blood on the toilet paper with wiping  Prior to this, she was having easy bowel movements on a daily basis  She has had heartburn for the past 1 year since starting Cymbalta  She must take omeprazole 20 mg every day to prevent heartburn  She still has an acidic taste in her mouth and complains of bad breath  However she does take antihistamines daily that can cause dry mouth  She denies abnormal weight loss  She denies family history of inflammatory bowel disease, celiac disease, colon cancer  She has never had abdominal surgery  She does not smoke and currently does not drink alcohol  REVIEW OF SYSTEMS:    CONSTITUTIONAL: Denies any fever, chills, rigors, and weight loss  HEENT: No earache or tinnitus  Denies hearing loss or visual disturbances  + oral ulcers  CARDIOVASCULAR: No chest pain or palpitations     RESPIRATORY: Denies any cough, hemoptysis, shortness of breath or dyspnea on exertion  GASTROINTESTINAL: As noted in the History of Present Illness  GENITOURINARY: No problems with urination  Denies any hematuria or dysuria  NEUROLOGIC: No dizziness or vertigo, denies headaches  MUSCULOSKELETAL:  + low back pain  SKIN: Denies skin rashes or itching  ENDOCRINE: Denies excessive thirst  Denies intolerance to heat or cold  PSYCHOSOCIAL: Denies depression or anxiety  Denies any recent memory loss         Historical Information   Past Medical History:   Diagnosis Date    Allergic     Allergic rhinitis     Anxiety     Aphthous ulcer     Chronic back pain     Eczema     Fibromyalgia     Fracture of pelvis ([address] )     Last Assessed:  3/1/17    GERD (gastroesophageal reflux disease)     Idiopathic hypotension     Jaw fracture (HCC)     Last Assessed:  3/1/17    Nystagmus     Pelvic fracture (HCC)     TMJ (dislocation of temporomandibular joint)     Vitamin B12 deficiency     Vitamin D deficiency      Past Surgical History:   Procedure Laterality Date    SEPTOPLASTY      SKIN BIOPSY      WISDOM TOOTH EXTRACTION       Social History   Social History     Substance and Sexual Activity   Alcohol Use Not Currently    Comment: Social     Social History     Substance and Sexual Activity   Drug Use No     Social History     Tobacco Use   Smoking Status Never Smoker   Smokeless Tobacco Never Used   Tobacco Comment    No second hand smoke exposure     Family History   Problem Relation Age of Onset    Coronary artery disease Mother     Hyperlipidemia Mother     Hypothyroidism Mother     Depression Mother     Diabetes Maternal Grandfather     Hernia Father     No Known Problems Maternal Grandmother     No Known Problems Paternal Grandmother     No Known Problems Maternal Aunt     No Known Problems Paternal Aunt     ADD / ADHD Brother     Substance Abuse Neg Hx     Suicidality Neg Hx        Meds/Allergies       Current Outpatient Medications:     atomoxetine (STRATTERA) 80 MG capsule    azithromycin (ZITHROMAX) 500 MG tablet    cetirizine (ZyrTEC Allergy) 10 mg tablet    Cholecalciferol (VITAMIN D-3) 5000 units TABS    Dapsone 7 5 % GEL    dicyclomine (BENTYL) 10 mg capsule    drospirenone-ethinyl estradiol (LAZARA) 3-0 02 MG per tablet    DULoxetine (CYMBALTA) 60 mg delayed release capsule    Epiduo Forte 0 3-2 5 % GEL    famotidine (PEPCID) 10 mg tablet    fexofenadine (ALLEGRA) 180 MG tablet    hydrOXYzine HCL (ATARAX) 25 mg tablet    ipratropium (ATROVENT) 0 03 % nasal spray    MAGNESIUM PO    melatonin 3 mg    omeprazole (PriLOSEC) 20 mg delayed release capsule    tiZANidine (ZANAFLEX) 2 mg tablet    triamcinolone (KENALOG) 0 1 % cream    vitamin B-12 (VITAMIN B-12) 1,000 mcg tablet    Allergies   Allergen Reactions    Lexapro [Escitalopram]     Robaxin [Methocarbamol] GI Intolerance           Objective     Blood pressure 104/60, temperature 98 8 °F (37 1 °C), temperature source Tympanic, height 5' 9" (1 753 m), weight 58 1 kg (128 lb), not currently breastfeeding  Body mass index is 18 9 kg/m²  PHYSICAL EXAM:      General Appearance:   Alert, cooperative, no distress   HEENT:   Normocephalic, atraumatic, anicteric      Neck:  Supple, symmetrical, trachea midline   Lungs:   Clear to auscultation bilaterally; no rales, rhonchi or wheezing; respirations unlabored    Heart[de-identified]   Regular rate and rhythm; no murmur, rub, or gallop  Abdomen:   Soft, non-tender, non-distended; normal bowel sounds; no masses, no organomegaly    Genitalia:   Deferred    Rectal:   Deferred    Extremities:  No cyanosis, clubbing or edema    Pulses:  2+ and symmetric    Skin:  No jaundice, rashes, or lesions    Lymph nodes:  No palpable cervical lymphadenopathy        Lab Results:   No visits with results within 1 Day(s) from this visit     Latest known visit with results is:   [facility]  on 04/05/2021   Component Date Value    QFT Nil 04/05/2021 0 03     QFT TB1-NIL 04/05/2021 0 03     QFT TB2-NIL 04/05/2021 0 02     QFT Mitogen-NIL 04/05/2021 >10 00     QFT Final Interpretation 04/05/2021 Negative     Varicella IgG 04/05/2021 Equivocal suggest repeat in 2-4 weeks  *    Mumps IgG 04/05/2021 IMMUNE     Rubeola IgG 04/05/2021 IMMUNE     Rubella IgG Quant 04/05/2021 24 1          Radiology Results:   No results found

## 2022-01-19 NOTE — PROGRESS NOTES
[facility] - Outpatient Consultation  Mae Ludwig 32 y o  female MRN: [id number]  Encounter: [id number]          ASSESSMENT AND PLAN:      1  Aphthous ulcer  2  Lower abdominal pain  3  Constipation, unspecified  4  Bloating  She was referred by her dentist due to recurrent aphthous ulcers  She also has chronic lower abdominal pain, reflux symptoms, bloating, constipation  I explained aphthous ulcers can be related to nutritional deficiencies (B12 deficiency) and can also be seen with systemic disease such as celiac disease, inflammatory bowel disease, Behcet syndrome, etc  Fortunately, she denies alarm symptoms such as blood in the stool, abnormal weight loss, family history of GI disease/malignancy       -We agree to start with non-invasive work-up especially since she is young  -I agree with checking basic blood work including CBC, CMP, TSH, B12, vitamin D   -Check celiac serologies and inflammatory markers    -Check H pylori stool antigen off PPI for 2 weeks (she can take Pepcid daily in the meantime)  -If work-up is abnormal, would recommend endoscopic evaluation   -Continue omeprazole 20 mg daily  -Start MiraLAX 17 g by mouth daily for constipation - gave instructions for how to titrate MiraLAX   -Gave information on high fiber diet  -Drink 64 oz of water daily    - Ambulatory referral to Gastroenterology  - Celiac Disease Antibody Profile; Future  - C-reactive protein; Future  - Calprotectin,Fecal  - H  pylori antigen, stool; Future    Follow-up in 6 weeks to re-evaluate symptoms  ______________________________________________________________________    HPI:  77-year-old female with history of depression, allergic rhinitis, chronic low back pain, elevated cholesterol, vitamin-D and B12 deficiency referred for history of aphthous ulcers, lower abdominal pain  She has had recurrent aphthous ulcers for years   These were initially intermittent but she has had 3 or 4 ulcers over the past month  Her dentist was concerned about nutritional deficiency, so he recommended seeing GI  She does report multiple GI symptoms including reflux, nausea, gas, bloating, abdominal pain, new constipation  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  She has been constipated since  She moves her bowels once every other day, on average  The stool is bulky and hard in consistency  Sometimes she feels the tear and will notice blood on the toilet paper with wiping  Prior to this, she was having easy bowel movements on a daily basis  She has had heartburn for the past 1 year since starting Cymbalta  She must take omeprazole 20 mg every day to prevent heartburn  She still has an acidic taste in her mouth and complains of bad breath  However she does take antihistamines daily that can cause dry mouth  She denies abnormal weight loss  She denies family history of inflammatory bowel disease, celiac disease, colon cancer  She has never had abdominal surgery  She does not smoke and currently does not drink alcohol  REVIEW OF SYSTEMS:    CONSTITUTIONAL: Denies any fever, chills, rigors, and weight loss  HEENT: No earache or tinnitus  Denies hearing loss or visual disturbances  + oral ulcers  CARDIOVASCULAR: No chest pain or palpitations  RESPIRATORY: Denies any cough, hemoptysis, shortness of breath or dyspnea on exertion  GASTROINTESTINAL: As noted in the History of Present Illness  GENITOURINARY: No problems with urination  Denies any hematuria or dysuria  NEUROLOGIC: No dizziness or vertigo, denies headaches  MUSCULOSKELETAL:  + low back pain  SKIN: Denies skin rashes or itching  ENDOCRINE: Denies excessive thirst  Denies intolerance to heat or cold  PSYCHOSOCIAL: Denies depression or anxiety  Denies any recent memory loss         Historical Information   Past Medical History:   Diagnosis Date    Allergic     Allergic rhinitis     Anxiety     Aphthous ulcer  Chronic back pain     Eczema     Fibromyalgia     Fracture of pelvis (HCC)     Last Assessed:  3/1/17    GERD (gastroesophageal reflux disease)     Idiopathic hypotension     Jaw fracture (HCC)     Last Assessed:  3/1/17    Nystagmus     Pelvic fracture (HCC)     TMJ (dislocation of temporomandibular joint)     Vitamin B12 deficiency     Vitamin D deficiency      Past Surgical History:   Procedure Laterality Date    SEPTOPLASTY      SKIN BIOPSY      WISDOM TOOTH EXTRACTION       Social History   Social History     Substance and Sexual Activity   Alcohol Use Not Currently    Comment: Social     Social History     Substance and Sexual Activity   Drug Use No     Social History     Tobacco Use   Smoking Status Never Smoker   Smokeless Tobacco Never Used   Tobacco Comment    No second hand smoke exposure     Family History   Problem Relation Age of Onset    Coronary artery disease Mother     Hyperlipidemia Mother     Hypothyroidism Mother     Depression Mother     Diabetes Maternal Grandfather     Hernia Father     No Known Problems Maternal Grandmother     No Known Problems Paternal Grandmother     No Known Problems Maternal Aunt     No Known Problems Paternal Aunt     ADD / ADHD Brother     Substance Abuse Neg Hx     Suicidality Neg Hx        Meds/Allergies       Current Outpatient Medications:     atomoxetine (STRATTERA) 80 MG capsule    azithromycin (ZITHROMAX) 500 MG tablet    cetirizine (ZyrTEC Allergy) 10 mg tablet    Cholecalciferol (VITAMIN D-3) 5000 units TABS    Dapsone 7 5 % GEL    dicyclomine (BENTYL) 10 mg capsule    drospirenone-ethinyl estradiol (LAZARA) 3-0 02 MG per tablet    DULoxetine (CYMBALTA) 60 mg delayed release capsule    Epiduo Forte 0 3-2 5 % GEL    famotidine (PEPCID) 10 mg tablet    fexofenadine (ALLEGRA) 180 MG tablet    hydrOXYzine HCL (ATARAX) 25 mg tablet    ipratropium (ATROVENT) 0 03 % nasal spray    MAGNESIUM PO    melatonin 3 mg   omeprazole (PriLOSEC) 20 mg delayed release capsule    tiZANidine (ZANAFLEX) 2 mg tablet    triamcinolone (KENALOG) 0 1 % cream    vitamin B-12 (VITAMIN B-12) 1,000 mcg tablet    Allergies   Allergen Reactions    Lexapro [Escitalopram]     Robaxin [Methocarbamol] GI Intolerance           Objective     Blood pressure 104/60, temperature 98 8 °F (37 1 °C), temperature source Tympanic, height 5' 9" (1 753 m), weight 58 1 kg (128 lb), not currently breastfeeding  Body mass index is 18 9 kg/m²  PHYSICAL EXAM:      General Appearance:   Alert, cooperative, no distress   HEENT:   Normocephalic, atraumatic, anicteric      Neck:  Supple, symmetrical, trachea midline   Lungs:   Clear to auscultation bilaterally; no rales, rhonchi or wheezing; respirations unlabored    Heart[de-identified]   Regular rate and rhythm; no murmur, rub, or gallop  Abdomen:   Soft, non-tender, non-distended; normal bowel sounds; no masses, no organomegaly    Genitalia:   Deferred    Rectal:   Deferred    Extremities:  No cyanosis, clubbing or edema    Pulses:  2+ and symmetric    Skin:  No jaundice, rashes, or lesions    Lymph nodes:  No palpable cervical lymphadenopathy        Lab Results:   No visits with results within 1 Day(s) from this visit  Latest known visit with results is:   [facility]  on 04/05/2021   Component Date Value    QFT Nil 04/05/2021 0 03     QFT TB1-NIL 04/05/2021 0 03     QFT TB2-NIL 04/05/2021 0 02     QFT Mitogen-NIL 04/05/2021 >10 00     QFT Final Interpretation 04/05/2021 Negative     Varicella IgG 04/05/2021 Equivocal suggest repeat in 2-4 weeks  *    Mumps IgG 04/05/2021 IMMUNE     Rubeola IgG 04/05/2021 IMMUNE     Rubella IgG Quant 04/05/2021 24 1          Radiology Results:   No results found

## 2022-01-19 NOTE — PATIENT INSTRUCTIONS
Start taking MiraLax 17 g by mouth daily  If no change after 3 days, you can increase the dose to twice daily  If you experience diarrhea, you can decrease the dose to half capful daily or as needed  Tried to drink 64 oz of fluid daily (water is best)  Increase fiber:  To get the most accurate results, you should ideally be off omeprazole for 2 weeks prior to giving stool sample for H pylori  You can take Pepcid over-the-counter daily instead while you are off the omeprazole  High Fiber Diet   AMBULATORY CARE:   A high-fiber diet  includes foods that have a high amount of fiber  Fiber is the part of fruits, vegetables, and grains that is not broken down by your body  Fiber keeps your bowel movements regular  Fiber can also help lower your cholesterol level, control blood sugar in people with diabetes, and relieve constipation  Fiber can also help you control your weight because it helps you feel full faster  Most adults should eat 25 to 35 grams of fiber each day  Talk to your dietitian or healthcare provider about the amount of fiber you need  Good sources of fiber:       · Foods with at least 4 grams of fiber per serving:      ? ? to ½ cup of high-fiber cereal (check the nutrition label on the box)    ? ½ cup of blackberries or raspberries    ? 4 dried prunes    ? 1 cooked artichoke    ? ½ cup of cooked legumes, such as lentils, or red, kidney, and alcantar beans    · Foods with 1 to 3 grams of fiber per serving:      ? 1 slice of whole-wheat, pumpernickel, or rye bread    ? ½ cup of cooked brown rice    ? 4 whole-wheat crackers    ? 1 cup of oatmeal    ? ½ cup of cereal with 1 to 3 grams of fiber per serving (check the nutrition label on the box)    ? 1 small piece of fruit, such as an apple, banana, pear, kiwi, or orange    ? 3 dates    ? ½ cup of canned apricots, fruit cocktail, peaches, or pears    ?  ½ cup of raw or cooked vegetables, such as carrots, cauliflower, cabbage, spinach, squash, or corn    Ways that you can increase fiber in your diet:   · Choose brown or wild rice instead of white rice  · Use whole wheat flour in recipes instead of white or all-purpose flour  · Add beans and peas to casseroles or soups  · Choose fresh fruit and vegetables with peels or skins on instead of juices  Other diet guidelines to follow:   · Add fiber to your diet slowly  You may have abdominal discomfort, bloating, and gas if you add fiber to your diet too quickly  · Drink plenty of liquids as you add fiber to your diet  You may have nausea or develop constipation if you do not drink enough water  Ask how much liquid to drink each day and which liquids are best for you  © Copyright [address] 2021 Information is for End User's use only and may not be sold, redistributed or otherwise used for commercial purposes  All illustrations and images included in CareNotes® are the copyrighted property of A D A M , Inc  or [facility] Lolis Huerta   The above information is an  only  It is not intended as medical advice for individual conditions or treatments  Talk to your doctor, nurse or pharmacist before following any medical regimen to see if it is safe and effective for you    take:

## 2022-01-19 NOTE — TELEPHONE ENCOUNTER
Patient called very upset that her Prior Laury Lent has not been started yet for her medication Epiduo Forte 0 3- 2 5 % gel, I verified pharmacy with patient  unfortunately I do not see any notes in the system that anyone talked to her about the auth since her visit  I assured her we would get started on this ASAP

## 2022-01-20 ENCOUNTER — APPOINTMENT (OUTPATIENT)
Dept: LAB | Facility: HOSPITAL | Age: 27
End: 2022-01-20
Payer: COMMERCIAL

## 2022-01-20 DIAGNOSIS — R14.0 BLOATING: ICD-10-CM

## 2022-01-20 DIAGNOSIS — E55.9 VITAMIN D DEFICIENCY: ICD-10-CM

## 2022-01-20 DIAGNOSIS — K12.0 APHTHOUS ULCER: ICD-10-CM

## 2022-01-20 DIAGNOSIS — Z00.00 ANNUAL PHYSICAL EXAM: ICD-10-CM

## 2022-01-20 DIAGNOSIS — K59.00 CONSTIPATION, UNSPECIFIED CONSTIPATION TYPE: ICD-10-CM

## 2022-01-20 DIAGNOSIS — R10.30 LOWER ABDOMINAL PAIN: ICD-10-CM

## 2022-01-20 LAB
25(OH)D3 SERPL-MCNC: 33.2 NG/ML (ref 30–100)
ALBUMIN SERPL BCP-MCNC: 4.1 G/DL (ref 3.5–5)
ALP SERPL-CCNC: 42 U/L (ref 46–116)
ALT SERPL W P-5'-P-CCNC: 15 U/L (ref 12–78)
ANION GAP SERPL CALCULATED.3IONS-SCNC: 10 MMOL/L (ref 4–13)
AST SERPL W P-5'-P-CCNC: 12 U/L (ref 5–45)
BASOPHILS # BLD AUTO: 0.05 THOUSANDS/ΜL (ref 0–0.1)
BASOPHILS NFR BLD AUTO: 1 % (ref 0–1)
BILIRUB SERPL-MCNC: 0.2 MG/DL (ref 0.2–1)
BUN SERPL-MCNC: 11 MG/DL (ref 5–25)
CALCIUM SERPL-MCNC: 9.1 MG/DL (ref 8.3–10.1)
CHLORIDE SERPL-SCNC: 103 MMOL/L (ref 100–108)
CHOLEST SERPL-MCNC: 215 MG/DL
CO2 SERPL-SCNC: 26 MMOL/L (ref 21–32)
CREAT SERPL-MCNC: 0.77 MG/DL (ref 0.6–1.3)
CRP SERPL QL: <3 MG/L
EOSINOPHIL # BLD AUTO: 0.2 THOUSAND/ΜL (ref 0–0.61)
EOSINOPHIL NFR BLD AUTO: 3 % (ref 0–6)
ERYTHROCYTE [DISTWIDTH] IN BLOOD BY AUTOMATED COUNT: 13.4 % (ref 11.6–15.1)
GFR SERPL CREATININE-BSD FRML MDRD: 106 ML/MIN/1.73SQ M
GLUCOSE SERPL-MCNC: 89 MG/DL (ref 65–140)
HCT VFR BLD AUTO: 39.1 % (ref 34.8–46.1)
HDLC SERPL-MCNC: 76 MG/DL
HGB BLD-MCNC: 13.2 G/DL (ref 11.5–15.4)
IMM GRANULOCYTES # BLD AUTO: 0.01 THOUSAND/UL (ref 0–0.2)
IMM GRANULOCYTES NFR BLD AUTO: 0 % (ref 0–2)
LDLC SERPL CALC-MCNC: 122 MG/DL (ref 0–100)
LYMPHOCYTES # BLD AUTO: 3.26 THOUSANDS/ΜL (ref 0.6–4.47)
LYMPHOCYTES NFR BLD AUTO: 52 % (ref 14–44)
MAGNESIUM SERPL-MCNC: 2 MG/DL (ref 1.6–2.6)
MCH RBC QN AUTO: 28.8 PG (ref 26.8–34.3)
MCHC RBC AUTO-ENTMCNC: 33.8 G/DL (ref 31.4–37.4)
MCV RBC AUTO: 85 FL (ref 82–98)
MONOCYTES # BLD AUTO: 0.39 THOUSAND/ΜL (ref 0.17–1.22)
MONOCYTES NFR BLD AUTO: 6 % (ref 4–12)
NEUTROPHILS # BLD AUTO: 2.41 THOUSANDS/ΜL (ref 1.85–7.62)
NEUTS SEG NFR BLD AUTO: 38 % (ref 43–75)
NONHDLC SERPL-MCNC: 139 MG/DL
NRBC BLD AUTO-RTO: 0 /100 WBCS
PLATELET # BLD AUTO: 248 THOUSANDS/UL (ref 149–390)
PMV BLD AUTO: 9.7 FL (ref 8.9–12.7)
POTASSIUM SERPL-SCNC: 4.1 MMOL/L (ref 3.5–5.3)
PROT SERPL-MCNC: 7.9 G/DL (ref 6.4–8.2)
RBC # BLD AUTO: 4.58 MILLION/UL (ref 3.81–5.12)
SODIUM SERPL-SCNC: 139 MMOL/L (ref 136–145)
T4 FREE SERPL-MCNC: 0.93 NG/DL (ref 0.76–1.46)
TRIGL SERPL-MCNC: 84 MG/DL
TSH SERPL DL<=0.05 MIU/L-ACNC: 11.4 UIU/ML (ref 0.36–3.74)
VIT B12 SERPL-MCNC: 1009 PG/ML (ref 100–900)
WBC # BLD AUTO: 6.32 THOUSAND/UL (ref 4.31–10.16)

## 2022-01-20 PROCEDURE — 86140 C-REACTIVE PROTEIN: CPT

## 2022-01-20 PROCEDURE — 84439 ASSAY OF FREE THYROXINE: CPT

## 2022-01-20 PROCEDURE — 36415 COLL VENOUS BLD VENIPUNCTURE: CPT

## 2022-01-20 PROCEDURE — 80061 LIPID PANEL: CPT | Performed by: NURSE PRACTITIONER

## 2022-01-20 PROCEDURE — 83735 ASSAY OF MAGNESIUM: CPT

## 2022-01-20 PROCEDURE — 86258 DGP ANTIBODY EACH IG CLASS: CPT

## 2022-01-20 PROCEDURE — 85025 COMPLETE CBC W/AUTO DIFF WBC: CPT

## 2022-01-20 PROCEDURE — 86364 TISS TRNSGLTMNASE EA IG CLAS: CPT

## 2022-01-20 PROCEDURE — 82306 VITAMIN D 25 HYDROXY: CPT

## 2022-01-20 PROCEDURE — 82607 VITAMIN B-12: CPT | Performed by: NURSE PRACTITIONER

## 2022-01-20 PROCEDURE — 82784 ASSAY IGA/IGD/IGG/IGM EACH: CPT

## 2022-01-20 PROCEDURE — 80053 COMPREHEN METABOLIC PANEL: CPT

## 2022-01-20 PROCEDURE — 84443 ASSAY THYROID STIM HORMONE: CPT

## 2022-01-20 PROCEDURE — 86231 EMA EACH IG CLASS: CPT

## 2022-01-21 LAB
ENDOMYSIUM IGA SER QL: NEGATIVE
GLIADIN PEPTIDE IGA SER-ACNC: 10 UNITS (ref 0–19)
GLIADIN PEPTIDE IGG SER-ACNC: 3 UNITS (ref 0–19)
IGA SERPL-MCNC: 178 MG/DL (ref 87–352)
TTG IGA SER-ACNC: <2 U/ML (ref 0–3)
TTG IGG SER-ACNC: <2 U/ML (ref 0–5)

## 2022-01-25 NOTE — TELEPHONE ENCOUNTER
I did call patient today but was unable to reach her at this time  I did leave her a voicemail with my name and direct number  I did make her aware that I started her PA today  Prior authorizations take up to 30 days and I did receive notification of a PA request as of 1/7/22  I did try and call [employer] to initiate an urgent review but their offices were closed today  I am going to fax out a hand written PA and follow up with them over the phone tomorrow  If she has any further questions she can call the office

## 2022-01-27 NOTE — ASSESSMENT & PLAN NOTE
Patient reports that she is noticing more aphthous ulcers lately  There were several noted on the bottom lip today  Offered Magic Mouthwash  She defered  Labs/Imaging Studies

## 2022-01-28 ENCOUNTER — OFFICE VISIT (OUTPATIENT)
Dept: FAMILY MEDICINE CLINIC | Facility: CLINIC | Age: 27
End: 2022-01-28
Payer: COMMERCIAL

## 2022-01-28 VITALS
DIASTOLIC BLOOD PRESSURE: 70 MMHG | RESPIRATION RATE: 16 BRPM | HEIGHT: 69 IN | HEART RATE: 110 BPM | TEMPERATURE: 98.8 F | SYSTOLIC BLOOD PRESSURE: 110 MMHG | WEIGHT: 127.2 LBS | BODY MASS INDEX: 18.84 KG/M2

## 2022-01-28 DIAGNOSIS — E03.9 HYPOTHYROIDISM, UNSPECIFIED TYPE: Primary | ICD-10-CM

## 2022-01-28 DIAGNOSIS — B35.1 TOENAIL FUNGUS: ICD-10-CM

## 2022-01-28 DIAGNOSIS — R25.2 MUSCLE CRAMPS: ICD-10-CM

## 2022-01-28 PROCEDURE — 99213 OFFICE O/P EST LOW 20 MIN: CPT | Performed by: FAMILY MEDICINE

## 2022-01-28 RX ORDER — TIZANIDINE 2 MG/1
2 TABLET ORAL 2 TIMES DAILY
Qty: 180 TABLET | Refills: 1 | Status: SHIPPED | OUTPATIENT
Start: 2022-01-28

## 2022-01-28 RX ORDER — TERBINAFINE HYDROCHLORIDE 250 MG/1
250 TABLET ORAL DAILY
Qty: 90 TABLET | Refills: 1 | Status: SHIPPED | OUTPATIENT
Start: 2022-01-28 | End: 2022-02-27

## 2022-01-28 RX ORDER — LEVOTHYROXINE SODIUM 25 MCG
25 TABLET ORAL DAILY
Qty: 30 TABLET | Refills: 5 | Status: SHIPPED | OUTPATIENT
Start: 2022-01-28

## 2022-01-28 NOTE — PROGRESS NOTES
Assessment and Plan:    Problem List Items Addressed This Visit        Endocrine    Hypothyroid - Primary    Relevant Medications    Synthroid 25 MCG tablet    Other Relevant Orders    Thyroid Antibodies Panel    TSH, 3rd generation    T4, free       Other    Muscle cramps    Relevant Medications    tiZANidine (ZANAFLEX) 2 mg tablet      Other Visit Diagnoses     Toenail fungus        Relevant Medications    terbinafine (LamISIL) 250 mg tablet                 Diagnoses and all orders for this visit:    Hypothyroidism, unspecified type  -     Thyroid Antibodies Panel; Future  -     TSH, 3rd generation; Future  -     T4, free; Future  -     Synthroid 25 MCG tablet; Take 1 tablet (25 mcg total) by mouth daily    Muscle cramps  -     tiZANidine (ZANAFLEX) 2 mg tablet; Take 1 tablet (2 mg total) by mouth 2 (two) times a day    Toenail fungus  -     terbinafine (LamISIL) 250 mg tablet; Take 1 tablet (250 mg total) by mouth daily              Subjective:      Patient ID: Jazmin Oseguera is a 32 y o  female  CC:    Chief Complaint   Patient presents with    Follow-up     Patient here for blood work results follow up  Pt will like to discuss fungal toe nails infection on both toes       HPI:    Here for f/u thyroid lab, toenail fungus, refill of muscle relaxant-back pain, plans to go [place] for physical therapy      The following portions of the patient's history were reviewed and updated as appropriate: allergies, current medications, past family history, past medical history, past social history, past surgical history and problem list         Review of Systems   Constitutional: Negative for activity change and appetite change  HENT: Negative for congestion and postnasal drip  Respiratory: Negative for shortness of breath  Cardiovascular: Negative for chest pain  Endocrine: Positive for cold intolerance  Negative for heat intolerance     Skin:        Brittle toenails, possibly due to toenail fungus Neurological: Negative for dizziness and headaches  Data to review:       Objective:    Vitals:    01/28/22 1550   BP: 110/70   BP Location: Right arm   Patient Position: Sitting   Cuff Size: Adult   Pulse: (!) 110   Resp: 16   Temp: 98 8 °F (37 1 °C)   TempSrc: Temporal   Weight: 57 7 kg (127 lb 3 2 oz)   Height: 5' 9" (1 753 m)        Physical Exam  Vitals reviewed  Constitutional:       Appearance: Normal appearance  HENT:      Nose: No congestion or rhinorrhea  Neck:      Comments: Thyroid not enlarged  Cardiovascular:      Rate and Rhythm: Normal rate and regular rhythm  Pulses: Normal pulses  Heart sounds: Normal heart sounds  Pulmonary:      Effort: Pulmonary effort is normal       Breath sounds: Normal breath sounds  Musculoskeletal:      Right lower leg: No edema  Left lower leg: No edema  Lymphadenopathy:      Cervical: No cervical adenopathy  Neurological:      Mental Status: She is alert     Psychiatric:         Mood and Affect: Mood normal

## 2022-02-02 NOTE — TELEPHONE ENCOUNTER
Called and followed up with patients insurance on PA status, they stated a decision was not made yet and should hear back by 2/9/22

## 2022-02-04 ENCOUNTER — TELEPHONE (OUTPATIENT)
Dept: DERMATOLOGY | Facility: CLINIC | Age: 27
End: 2022-02-04

## 2022-02-04 NOTE — TELEPHONE ENCOUNTER
Rec'd fax from [facility] approval for Epiduo Forte 0 3-2 5% gel pump, scanned doc and sent message to [facility]

## 2022-02-07 ENCOUNTER — APPOINTMENT (OUTPATIENT)
Dept: LAB | Facility: HOSPITAL | Age: 27
End: 2022-02-07
Payer: COMMERCIAL

## 2022-02-07 DIAGNOSIS — R10.30 LOWER ABDOMINAL PAIN: ICD-10-CM

## 2022-02-07 DIAGNOSIS — R14.0 BLOATING: ICD-10-CM

## 2022-02-07 DIAGNOSIS — K12.0 APHTHOUS ULCER: ICD-10-CM

## 2022-02-07 DIAGNOSIS — K59.00 CONSTIPATION, UNSPECIFIED CONSTIPATION TYPE: ICD-10-CM

## 2022-02-07 PROCEDURE — 83993 ASSAY FOR CALPROTECTIN FECAL: CPT | Performed by: PHYSICIAN ASSISTANT

## 2022-02-07 PROCEDURE — 87338 HPYLORI STOOL AG IA: CPT

## 2022-02-08 LAB — H PYLORI AG STL QL IA: NEGATIVE

## 2022-02-09 LAB — CALPROTECTIN STL-MCNT: 28 UG/G (ref 0–120)

## 2022-02-17 ENCOUNTER — OFFICE VISIT (OUTPATIENT)
Dept: OBGYN CLINIC | Facility: MEDICAL CENTER | Age: 27
End: 2022-02-17
Payer: COMMERCIAL

## 2022-02-17 VITALS
WEIGHT: 127 LBS | BODY MASS INDEX: 18.81 KG/M2 | SYSTOLIC BLOOD PRESSURE: 100 MMHG | HEIGHT: 69 IN | DIASTOLIC BLOOD PRESSURE: 60 MMHG

## 2022-02-17 DIAGNOSIS — B37.3 VULVOVAGINAL CANDIDIASIS: Primary | ICD-10-CM

## 2022-02-17 PROCEDURE — 99213 OFFICE O/P EST LOW 20 MIN: CPT | Performed by: STUDENT IN AN ORGANIZED HEALTH CARE EDUCATION/TRAINING PROGRAM

## 2022-02-17 RX ORDER — FLUCONAZOLE 150 MG/1
150 TABLET ORAL
Qty: 2 TABLET | Refills: 0 | Status: SHIPPED | OUTPATIENT
Start: 2022-02-17 | End: 2022-02-21

## 2022-02-17 NOTE — PROGRESS NOTES
[facility] [address] [place] [address]    Assessment/Plan:  Yao Manning is a 32 y o  Guerra Hampton who presents with vulvar candidiasis  No problem-specific Assessment & Plan notes found for this encounter  Diagnoses and all orders for this visit:    Vulvovaginal candidiasis  -     fluconazole (DIFLUCAN) 150 mg tablet; Take 1 tablet (150 mg total) by mouth every third day for 2 doses          Subjective:   Yao Manning is a 32 y o  Guerra Edwar female  CC: vaginal yeast infection    HPI: Angel Wheatley presents with vaginal itching and discharge  ROS: Review of Systems   Constitutional: Negative for chills and fever  Respiratory: Negative for cough and shortness of breath  Cardiovascular: Negative for chest pain and leg swelling  Gastrointestinal: Negative for abdominal pain, nausea and vomiting  Genitourinary: Negative for dysuria, frequency and urgency  Neurological: Negative for dizziness, light-headedness and headaches  PFSH: The following portions of the patient's history were reviewed and updated as appropriate: allergies, current medications, past family history, past medical history, obstetric history, gynecologic history, past social history, past surgical history and problem list        Objective:  /60   Ht 5' 9" (1 753 m)   Wt 57 6 kg (127 lb)   BMI 18 75 kg/m²    Physical Exam  Constitutional:       Appearance: Normal appearance  HENT:      Head: Normocephalic and atraumatic  Cardiovascular:      Rate and Rhythm: Normal rate  Pulmonary:      Effort: Pulmonary effort is normal    Abdominal:      General: There is no distension  Tenderness: There is no abdominal tenderness  There is no guarding  Genitourinary:     Exam position: Lithotomy position  Pubic Area: No rash  Labia:         Right: No rash, tenderness or lesion  Left: No rash, tenderness or lesion         Urethra: No prolapse, urethral swelling or urethral lesion  Vagina: Vaginal discharge (scant discharge, rare hyphae on wet mount) present  No erythema, tenderness, bleeding or lesions  Cervix: No cervical motion tenderness, discharge, friability or erythema  Lymphadenopathy:      Lower Body: No right inguinal adenopathy  No left inguinal adenopathy  Neurological:      Mental Status: She is alert             Christine Dumont MD  [address] [place] [address]  2/17/2022 6:46 PM

## 2022-03-10 ENCOUNTER — OFFICE VISIT (OUTPATIENT)
Dept: GASTROENTEROLOGY | Facility: CLINIC | Age: 27
End: 2022-03-10
Payer: COMMERCIAL

## 2022-03-10 VITALS
WEIGHT: 129 LBS | HEIGHT: 69 IN | HEART RATE: 98 BPM | OXYGEN SATURATION: 98 % | DIASTOLIC BLOOD PRESSURE: 68 MMHG | TEMPERATURE: 97.6 F | BODY MASS INDEX: 19.11 KG/M2 | RESPIRATION RATE: 18 BRPM | SYSTOLIC BLOOD PRESSURE: 104 MMHG

## 2022-03-10 DIAGNOSIS — K58.1 IRRITABLE BOWEL SYNDROME WITH CONSTIPATION: Primary | ICD-10-CM

## 2022-03-10 DIAGNOSIS — K21.9 GASTROESOPHAGEAL REFLUX DISEASE, UNSPECIFIED WHETHER ESOPHAGITIS PRESENT: ICD-10-CM

## 2022-03-10 PROCEDURE — 99213 OFFICE O/P EST LOW 20 MIN: CPT | Performed by: PHYSICIAN ASSISTANT

## 2022-03-10 NOTE — PROGRESS NOTES
[address] [facility] - Outpatient Follow-up Note  Trace Mayer 32 y o  female MRN: [id number]  Encounter: [id number]          ASSESSMENT AND PLAN:      1  Irritable bowel syndrome with constipation  Her lower abdominal pain, bloating, and constipation is likely due to IBS + hypothyroidism  She was started on Synthroid in January and does report improvement in symptoms  Non-invasive workup has been negative for H pylori, celiac disease, IBD  She will continue Synthroid and also work on increasing high-fiber foods and water in her diet  She can take MiraLax as needed  2  Gastroesophageal reflux disease, unspecified whether esophagitis present  Well controlled  She was able to successfully stop taking omeprazole  Continue reflux precautions  Follow-up as needed  ______________________________________________________________________    SUBJECTIVE:  30-year-old female presenting for follow-up of abdominal pain, constipation, bloating  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  Overall she is feeling better  She was able to successfully come off omeprazole  She denies any reflux symptoms  Her bloating and constipation has improved with high-fiber diet and water  She does notice some constipation if she is dehydrated  She has not needed MiraLax  She denies any abnormal weight loss or bleeding  REVIEW OF SYSTEMS IS OTHERWISE NEGATIVE        Historical Information   Past Medical History:   Diagnosis Date    Allergic     Allergic rhinitis     Anxiety     Aphthous ulcer     Chronic back pain     Eczema     Fibromyalgia     Fracture of pelvis (HCC)     Last Assessed:  3/1/17    GERD (gastroesophageal reflux disease)     Idiopathic hypotension     Jaw fracture (HCC)     Last Assessed:  3/1/17    Nystagmus     Pelvic fracture (HCC)     TMJ (dislocation of temporomandibular joint)     Vitamin B12 deficiency     Vitamin D deficiency      Past Surgical History:   Procedure Laterality Date    SEPTOPLASTY      SKIN BIOPSY      WISDOM TOOTH EXTRACTION       Social History   Social History     Substance and Sexual Activity   Alcohol Use Not Currently    Comment: Social     Social History     Substance and Sexual Activity   Drug Use No     Social History     Tobacco Use   Smoking Status Never Smoker   Smokeless Tobacco Never Used   Tobacco Comment    No second hand smoke exposure     Family History   Problem Relation Age of Onset    Coronary artery disease Mother     Hyperlipidemia Mother     Hypothyroidism Mother     Depression Mother     Diabetes Maternal Grandfather     Hernia Father     No Known Problems Maternal Grandmother     No Known Problems Paternal Grandmother     No Known Problems Maternal Aunt     No Known Problems Paternal Aunt     ADD / ADHD Brother     Substance Abuse Neg Hx     Suicidality Neg Hx        Meds/Allergies       Current Outpatient Medications:     atomoxetine (STRATTERA) 80 MG capsule    azithromycin (ZITHROMAX) 500 MG tablet    cetirizine (ZyrTEC Allergy) 10 mg tablet    Cholecalciferol (VITAMIN D-3) 5000 units TABS    Dapsone 7 5 % GEL    dicyclomine (BENTYL) 10 mg capsule    drospirenone-ethinyl estradiol (LAZARA) 3-0 02 MG per tablet    DULoxetine (CYMBALTA) 60 mg delayed release capsule    Epiduo Forte 0 3-2 5 % GEL    famotidine (PEPCID) 10 mg tablet    fexofenadine (ALLEGRA) 180 MG tablet    ipratropium (ATROVENT) 0 03 % nasal spray    MAGNESIUM PO    melatonin 3 mg    omeprazole (PriLOSEC) 20 mg delayed release capsule    Synthroid 25 MCG tablet    tiZANidine (ZANAFLEX) 2 mg tablet    triamcinolone (KENALOG) 0 1 % cream    vitamin B-12 (VITAMIN B-12) 1,000 mcg tablet    hydrOXYzine HCL (ATARAX) 25 mg tablet    Allergies   Allergen Reactions    Lexapro [Escitalopram]     Robaxin [Methocarbamol] GI Intolerance           Objective     Blood pressure 104/68, pulse 98, temperature 97 6 °F (36 4 °C), temperature source Tympanic, resp  rate 18, height 5' 9" (1 753 m), weight 58 5 kg (129 lb), SpO2 98 %, not currently breastfeeding  Body mass index is 19 05 kg/m²  PHYSICAL EXAM:      General Appearance:   Alert, cooperative, no distress   HEENT:   Normocephalic, atraumatic, anicteric      Neck:  Supple, symmetrical, trachea midline   Lungs:   Clear to auscultation bilaterally; no rales, rhonchi or wheezing; respirations unlabored    Heart[de-identified]   Regular rate and rhythm; no murmur, rub, or gallop  Abdomen:   Soft, non-tender, non-distended; normal bowel sounds; no masses, no organomegaly    Genitalia:   Deferred    Rectal:   Deferred    Extremities:  No cyanosis, clubbing or edema    Pulses:  2+ and symmetric    Skin:  No jaundice, rashes, or lesions    Lymph nodes:  No palpable cervical lymphadenopathy        Lab Results:   No visits with results within 1 Day(s) from this visit  Latest known visit with results is:   Appointment on 02/07/2022   Component Date Value    H pylori Ag, Stl 02/07/2022 Negative          Radiology Results:   No results found

## 2022-03-10 NOTE — LETTER
March 10, 2022     Dez Zepeda MD  [address]  [address] [facility] [address]    Patient: Konrad Cabrera   YOB: 1995   Date of Visit: 3/10/2022       Dear Dr Yari Benitez: Thank you for referring Ayaz Zelaya to me for evaluation  Below are my notes for this consultation  If you have questions, please do not hesitate to call me  I look forward to following your patient along with you  Sincerely,        Meenu oCllier PA-C        CC: No Recipients  Meenu Collier PA-C  3/10/2022  2:53 PM  Sign when Signing Visit  [facility] - Outpatient Follow-up Note  Aria Sanchez Leyla 32 y o  female MRN: [id number]  Encounter: [id number]          ASSESSMENT AND PLAN:      1  Irritable bowel syndrome with constipation  Her lower abdominal pain, bloating, and constipation is likely due to IBS + hypothyroidism  She was started on Synthroid in January and does report improvement in symptoms  Non-invasive workup has been negative for H pylori, celiac disease, IBD  She will continue Synthroid and also work on increasing high-fiber foods and water in her diet  She can take MiraLax as needed  2  Gastroesophageal reflux disease, unspecified whether esophagitis present  Well controlled  She was able to successfully stop taking omeprazole  Continue reflux precautions  Follow-up as needed  ______________________________________________________________________    SUBJECTIVE:  60-year-old female presenting for follow-up of abdominal pain, constipation, bloating  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  She was able to successfully come off omeprazole  She denies any reflux symptoms  Her bloating and constipation has improved with high-fiber diet and water  She does notice some constipation if she is dehydrated  She has not needed MiraLax  She denies any abnormal weight loss or bleeding      REVIEW OF SYSTEMS IS OTHERWISE NEGATIVE        Historical Information   Past Medical History:   Diagnosis Date    Allergic     Allergic rhinitis     Anxiety     Aphthous ulcer     Chronic back pain     Eczema     Fibromyalgia     Fracture of pelvis ([address] )     Last Assessed:  3/1/17    GERD (gastroesophageal reflux disease)     Idiopathic hypotension     Jaw fracture (HCC)     Last Assessed:  3/1/17    Nystagmus     Pelvic fracture (HCC)     TMJ (dislocation of temporomandibular joint)     Vitamin B12 deficiency     Vitamin D deficiency      Past Surgical History:   Procedure Laterality Date    SEPTOPLASTY      SKIN BIOPSY      WISDOM TOOTH EXTRACTION       Social History   Social History     Substance and Sexual Activity   Alcohol Use Not Currently    Comment: Social     Social History     Substance and Sexual Activity   Drug Use No     Social History     Tobacco Use   Smoking Status Never Smoker   Smokeless Tobacco Never Used   Tobacco Comment    No second hand smoke exposure     Family History   Problem Relation Age of Onset    Coronary artery disease Mother     Hyperlipidemia Mother     Hypothyroidism Mother     Depression Mother     Diabetes Maternal Grandfather     Hernia Father     No Known Problems Maternal Grandmother     No Known Problems Paternal Grandmother     No Known Problems Maternal Aunt     No Known Problems Paternal Aunt     ADD / ADHD Brother     Substance Abuse Neg Hx     Suicidality Neg Hx        Meds/Allergies       Current Outpatient Medications:     atomoxetine (STRATTERA) 80 MG capsule    azithromycin (ZITHROMAX) 500 MG tablet    cetirizine (ZyrTEC Allergy) 10 mg tablet    Cholecalciferol (VITAMIN D-3) 5000 units TABS    Dapsone 7 5 % GEL    dicyclomine (BENTYL) 10 mg capsule    drospirenone-ethinyl estradiol (LAZARA) 3-0 02 MG per tablet    DULoxetine (CYMBALTA) 60 mg delayed release capsule    Epiduo Forte 0 3-2 5 % GEL    famotidine (PEPCID) 10 mg tablet    fexofenadine (ALLEGRA) 180 MG tablet    ipratropium (ATROVENT) 0 03 % nasal spray    MAGNESIUM PO    melatonin 3 mg    omeprazole (PriLOSEC) 20 mg delayed release capsule    Synthroid 25 MCG tablet    tiZANidine (ZANAFLEX) 2 mg tablet    triamcinolone (KENALOG) 0 1 % cream    vitamin B-12 (VITAMIN B-12) 1,000 mcg tablet    hydrOXYzine HCL (ATARAX) 25 mg tablet    Allergies   Allergen Reactions    Lexapro [Escitalopram]     Robaxin [Methocarbamol] GI Intolerance           Objective     Blood pressure 104/68, pulse 98, temperature 97 6 °F (36 4 °C), temperature source Tympanic, resp  rate 18, height 5' 9" (1 753 m), weight 58 5 kg (129 lb), SpO2 98 %, not currently breastfeeding  Body mass index is 19 05 kg/m²  PHYSICAL EXAM:      General Appearance:   Alert, cooperative, no distress   HEENT:   Normocephalic, atraumatic, anicteric      Neck:  Supple, symmetrical, trachea midline   Lungs:   Clear to auscultation bilaterally; no rales, rhonchi or wheezing; respirations unlabored    Heart[de-identified]   Regular rate and rhythm; no murmur, rub, or gallop  Abdomen:   Soft, non-tender, non-distended; normal bowel sounds; no masses, no organomegaly    Genitalia:   Deferred    Rectal:   Deferred    Extremities:  No cyanosis, clubbing or edema    Pulses:  2+ and symmetric    Skin:  No jaundice, rashes, or lesions    Lymph nodes:  No palpable cervical lymphadenopathy        Lab Results:   No visits with results within 1 Day(s) from this visit  Latest known visit with results is:   Appointment on 02/07/2022   Component Date Value    H pylori Ag, Stl 02/07/2022 Negative          Radiology Results:   No results found

## 2022-04-12 ENCOUNTER — APPOINTMENT (OUTPATIENT)
Dept: LAB | Facility: HOSPITAL | Age: 27
End: 2022-04-12
Payer: COMMERCIAL

## 2022-04-12 DIAGNOSIS — E03.9 HYPOTHYROIDISM, UNSPECIFIED TYPE: ICD-10-CM

## 2022-04-12 LAB
T4 FREE SERPL-MCNC: 0.95 NG/DL (ref 0.76–1.46)
TSH SERPL DL<=0.05 MIU/L-ACNC: 1.66 UIU/ML (ref 0.45–4.5)

## 2022-04-12 PROCEDURE — 36415 COLL VENOUS BLD VENIPUNCTURE: CPT

## 2022-04-12 PROCEDURE — 84439 ASSAY OF FREE THYROXINE: CPT

## 2022-04-12 PROCEDURE — 84443 ASSAY THYROID STIM HORMONE: CPT

## 2022-04-12 PROCEDURE — 86376 MICROSOMAL ANTIBODY EACH: CPT

## 2022-04-12 PROCEDURE — 86800 THYROGLOBULIN ANTIBODY: CPT
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
## 2022-04-13 ENCOUNTER — TELEPHONE (OUTPATIENT)
Dept: DERMATOLOGY | Facility: CLINIC | Age: 27
End: 2022-04-13

## 2022-04-13 LAB
THYROGLOB AB SERPL-ACNC: <1 IU/ML (ref 0–0.9)
THYROPEROXIDASE AB SERPL-ACNC: <8 IU/ML (ref 0–34)

## 2022-04-13 NOTE — TELEPHONE ENCOUNTER
Called pt to try and reschedule appt but n/a, left v/m with our c/b info  Dr Bill Landon isn't feeling well and will be going home

## 2022-04-13 NOTE — TELEPHONE ENCOUNTER
Pt has been left message on vm, through teams and mychart that apt today has been cancelled due to physician illness and to cb to reschedule

## 2022-04-22 ENCOUNTER — TELEMEDICINE (OUTPATIENT)
Dept: PSYCHIATRY | Facility: CLINIC | Age: 27
End: 2022-04-22
Payer: COMMERCIAL

## 2022-04-22 VITALS — WEIGHT: 128 LBS | HEIGHT: 69 IN | BODY MASS INDEX: 18.96 KG/M2

## 2022-04-22 DIAGNOSIS — F41.1 GAD (GENERALIZED ANXIETY DISORDER): Chronic | ICD-10-CM

## 2022-04-22 DIAGNOSIS — F41.0 PANIC DISORDER WITHOUT AGORAPHOBIA: Chronic | ICD-10-CM

## 2022-04-22 DIAGNOSIS — G47.09 OTHER INSOMNIA: Chronic | ICD-10-CM

## 2022-04-22 DIAGNOSIS — F90.2 ATTENTION DEFICIT HYPERACTIVITY DISORDER, COMBINED TYPE: Chronic | ICD-10-CM

## 2022-04-22 DIAGNOSIS — F33.42 MAJOR DEPRESSIVE DISORDER, RECURRENT EPISODE, IN FULL REMISSION (HCC): Primary | Chronic | ICD-10-CM

## 2022-04-22 PROCEDURE — 99214 OFFICE O/P EST MOD 30 MIN: CPT | Performed by: PSYCHIATRY & NEUROLOGY

## 2022-04-22 PROCEDURE — 90833 PSYTX W PT W E/M 30 MIN: CPT | Performed by: PSYCHIATRY & NEUROLOGY

## 2022-04-22 RX ORDER — DULOXETIN HYDROCHLORIDE 60 MG/1
60 CAPSULE, DELAYED RELEASE ORAL DAILY
Qty: 90 CAPSULE | Refills: 2 | Status: SHIPPED | OUTPATIENT
Start: 2022-04-22 | End: 2023-01-17

## 2022-04-22 RX ORDER — HYDROXYZINE HYDROCHLORIDE 25 MG/1
50-75 TABLET, FILM COATED ORAL
Qty: 270 TABLET | Refills: 2 | Status: SHIPPED | OUTPATIENT
Start: 2022-04-22 | End: 2023-01-17

## 2022-04-22 RX ORDER — ATOMOXETINE 80 MG/1
80 CAPSULE ORAL DAILY
Qty: 90 CAPSULE | Refills: 2 | Status: SHIPPED | OUTPATIENT
Start: 2022-04-22 | End: 2023-01-17

## 2022-04-22 NOTE — BH TREATMENT PLAN
TREATMENT PLAN (Medication Management Only)        [facility]    Name/Date of Birth/MRN:  Rolando Uribe 32 y o  1995 MRN: [id number]  Date of Treatment Plan: April 22, 2022  Diagnosis/Diagnoses:   1  Major depressive disorder, recurrent episode, in full remission ([facility] [address] )    2  KEVAN (generalized anxiety disorder)    3  Panic disorder without agoraphobia    4  Attention deficit hyperactivity disorder, combined type    5  Other insomnia      Strengths/Personal Resources for Self-Care: "insight, knowledge"  Area/Areas of need (in own words): "good life style habits, sleep, relaxation methods"  1  Long Term Goal:   maintain control of anxiety, maintain stability of depression  Target Date: 4 months - 8/22/2022  Person/Persons responsible for completion of goal: Zahra Franco  2  Short Term Objective (s) - How will we reach this goal?:   A  Provider new recommended medication/dosage changes and/or continue medication(s): continue current medications as prescribed (Cymbalta, Atarax, Melatonin and Strattera)  B   N/A   C   N/A  Target Date: 4 months - 8/22/2022  Person/Persons Responsible for Completion of Goal: Zahra Franco   Progress Towards Goals: stable  Treatment Modality: medication management every 4 months  Review due 180 days from date of this plan: 6 months - 10/22/2022  Expected length of service: maintenance unless revised  My Physician/PA/NP and I have developed this plan together and I agree to work on the goals and objectives  I understand the treatment goals that were developed for my treatment    Electronic Signatures: on file (unless signed below)    Izabela Riojas MD 04/22/22

## 2022-04-26 ENCOUNTER — TELEPHONE (OUTPATIENT)
Dept: PSYCHIATRY | Facility: CLINIC | Age: 27
End: 2022-04-26

## 2022-04-26 NOTE — TELEPHONE ENCOUNTER
LVM  Last seen 4/22/22  Pt needs a 4 month f/u with Dr Chery Mackey  Previous appt was virtual  Please schedule

## 2022-06-28 DIAGNOSIS — Z78.9 USES BIRTH CONTROL: ICD-10-CM

## 2022-06-28 RX ORDER — DROSPIRENONE AND ETHINYL ESTRADIOL TABLETS 0.02-3(28)
KIT ORAL
Qty: 84 TABLET | Refills: 0 | Status: SHIPPED | OUTPATIENT
Start: 2022-06-28

## 2022-08-18 ENCOUNTER — TELEPHONE (OUTPATIENT)
Dept: PSYCHIATRY | Facility: CLINIC | Age: 27
End: 2022-08-18

## 2022-08-18 NOTE — TELEPHONE ENCOUNTER
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

## 2022-08-18 NOTE — TELEPHONE ENCOUNTER
Left message on [facility] voice mail regarding her medications issues  I would not be able to prescribe Zofran long term for her for nausea - if she cannot tolerate nausea, decrease in Cymbalta dose or switch to another antidepressant should be considered  This will need to be reviewed with Bhargavi Tyson when she calls back

## 2022-09-08 ENCOUNTER — TELEPHONE (OUTPATIENT)
Dept: OBGYN CLINIC | Facility: MEDICAL CENTER | Age: 27
End: 2022-09-08

## 2022-09-08 DIAGNOSIS — B37.31 VULVOVAGINAL CANDIDIASIS: Primary | ICD-10-CM

## 2022-09-08 RX ORDER — FLUCONAZOLE 150 MG/1
150 TABLET ORAL ONCE
Qty: 1 TABLET | Refills: 0 | Status: SHIPPED | OUTPATIENT
Start: 2022-09-08 | End: 2022-09-08

## 2022-09-08 NOTE — TELEPHONE ENCOUNTER
Patient called about a refill for diflucan she is currently out of town and has a yearly coming up and would like to know if it can be sent  Pharmacy on file ([id number]) Please review when you get a chance   Thank you

## 2022-09-19 ENCOUNTER — TELEPHONE (OUTPATIENT)
Dept: OBGYN CLINIC | Facility: MEDICAL CENTER | Age: 27
End: 2022-09-19

## 2022-09-19 DIAGNOSIS — Z78.9 USES BIRTH CONTROL: ICD-10-CM

## 2022-09-19 DIAGNOSIS — B37.31 VULVOVAGINAL CANDIDIASIS: Primary | ICD-10-CM

## 2022-09-19 RX ORDER — DROSPIRENONE AND ETHINYL ESTRADIOL 0.02-3(28)
1 KIT ORAL DAILY
Qty: 84 TABLET | Refills: 3 | Status: SHIPPED | OUTPATIENT
Start: 2022-09-19

## 2022-09-19 RX ORDER — FLUCONAZOLE 150 MG/1
150 TABLET ORAL ONCE
Qty: 1 TABLET | Refills: 0 | Status: SHIPPED | OUTPATIENT
Start: 2022-09-19 | End: 2022-09-19

## 2022-09-27 ENCOUNTER — TELEPHONE (OUTPATIENT)
Dept: PSYCHIATRY | Facility: CLINIC | Age: 27
End: 2022-09-27

## 2022-10-12 NOTE — TELEPHONE ENCOUNTER
Called and spoke with patient regarding scheduling a follow up appt  Patient she would call the office if she would like to be seen but currently lives [place] and cannot conduct virtual appts

## 2022-11-21 DIAGNOSIS — F33.42 MAJOR DEPRESSIVE DISORDER, RECURRENT EPISODE, IN FULL REMISSION (HCC): Primary | Chronic | ICD-10-CM

## 2022-11-21 DIAGNOSIS — F41.1 GAD (GENERALIZED ANXIETY DISORDER): Chronic | ICD-10-CM

## 2022-11-21 DIAGNOSIS — F41.0 PANIC DISORDER WITHOUT AGORAPHOBIA: Chronic | ICD-10-CM

## 2022-11-21 RX ORDER — HYDROXYZINE HYDROCHLORIDE 25 MG/1
50-75 TABLET, FILM COATED ORAL
Qty: 270 TABLET | Refills: 1 | Status: SHIPPED | OUTPATIENT
Start: 2022-11-21 | End: 2023-05-20

## 2022-11-21 RX ORDER — DULOXETIN HYDROCHLORIDE 60 MG/1
60 CAPSULE, DELAYED RELEASE ORAL DAILY
Qty: 90 CAPSULE | Refills: 1 | Status: SHIPPED | OUTPATIENT
Start: 2022-11-21 | End: 2023-05-20

## 2022-11-21 NOTE — TELEPHONE ENCOUNTER
Medication Refill Request     Name of Medication Cymbalta  Dose/Frequency 60 mg 1 capsule  Quantity 90  Verified pharmacy   [x]  Verified ordering Provider   [x]  Does patient have enough for the next 3 days? Yes [] No [x]  Does patient have a follow-up appointment scheduled? Yes [x] No []   If so when is appointment: 3/8    Medication Refill Request     Name of Medication Atarax  Dose/Frequency 50-75 mg 2-3 tablets  Quantity 270  Verified pharmacy   [x]  Verified ordering Provider   [x]  Does patient have enough for the next 3 days? Yes [] No [x]  Does patient have a follow-up appointment scheduled?  Yes [x] No []   If so when is appointment: 3/8

## 2022-11-22 ENCOUNTER — TELEPHONE (OUTPATIENT)
Dept: OBGYN CLINIC | Facility: MEDICAL CENTER | Age: 27
End: 2022-11-22

## 2022-11-22 NOTE — TELEPHONE ENCOUNTER
Patient called requesting her script for Rachele be sent to [facility] mail order pharmacy       Please advise

## 2022-11-28 ENCOUNTER — TELEPHONE (OUTPATIENT)
Dept: FAMILY MEDICINE CLINIC | Facility: CLINIC | Age: 27
End: 2022-11-28

## 2022-11-28 DIAGNOSIS — Z78.9 USES BIRTH CONTROL: ICD-10-CM

## 2022-11-28 RX ORDER — DROSPIRENONE AND ETHINYL ESTRADIOL 0.02-3(28)
1 KIT ORAL DAILY
Qty: 28 TABLET | Refills: 0 | Status: SHIPPED | OUTPATIENT
Start: 2022-11-28 | End: 2022-12-19 | Stop reason: SDUPTHER

## 2022-11-28 NOTE — TELEPHONE ENCOUNTER
Patient called wanted to know if she could have a new script so she can have physical therapy on her upper and lower back  Please call her when ready  She was given one last year and did not go

## 2022-12-19 DIAGNOSIS — Z78.9 USES BIRTH CONTROL: ICD-10-CM

## 2022-12-19 RX ORDER — DROSPIRENONE AND ETHINYL ESTRADIOL 0.02-3(28)
1 KIT ORAL DAILY
Qty: 28 TABLET | Refills: 0 | Status: SHIPPED | OUTPATIENT
Start: 2022-12-19

## 2022-12-19 NOTE — TELEPHONE ENCOUNTER
Patient called for a birth control refill [facility]) Pharmacy on file  ([facility])  Yearly is scheduled for 01/20/22  Please review when you get a chance   Thank you

## 2022-12-23 DIAGNOSIS — F41.0 PANIC DISORDER WITHOUT AGORAPHOBIA: Chronic | ICD-10-CM

## 2022-12-23 DIAGNOSIS — F41.1 GAD (GENERALIZED ANXIETY DISORDER): Chronic | ICD-10-CM

## 2022-12-23 DIAGNOSIS — F33.42 MAJOR DEPRESSIVE DISORDER, RECURRENT EPISODE, IN FULL REMISSION (HCC): Chronic | ICD-10-CM

## 2022-12-23 RX ORDER — DULOXETIN HYDROCHLORIDE 60 MG/1
60 CAPSULE, DELAYED RELEASE ORAL DAILY
Qty: 90 CAPSULE | Refills: 1 | OUTPATIENT
Start: 2022-12-23 | End: 2023-06-21

## 2022-12-23 RX ORDER — HYDROXYZINE HYDROCHLORIDE 25 MG/1
50-75 TABLET, FILM COATED ORAL
Qty: 270 TABLET | Refills: 1 | OUTPATIENT
Start: 2022-12-23 | End: 2023-06-21

## 2022-12-23 NOTE — TELEPHONE ENCOUNTER
Medication Refill Request     Name of Medication Hydrpxyzine  Dose/Frequency 25 mg/ Take 2-3 tablets(50 -75 mg total) by mouth daily at bedtime as needed (for anxiety or sleep)   Quantity 270  Verified pharmacy   [x]  Verified ordering Provider   [x]  Does patient have enough for the next 3 days? Yes [] No [x]  Does patient have a follow-up appointment scheduled? Yes [x] No []   If so when is appointment: 3/8/2023    Medication Refill Request     Name of Medication  CYMBALTA  Dose/Frequency 60 mg/ take 1 capsule (60 mg total) by mouth daily  Quantity 90  Verified pharmacy   [x]  Verified ordering Provider   [x]  Does patient have enough for the next 3 days? Yes [] No [x]  Does patient have a follow-up appointment scheduled?  Yes [x] No []   If so when is appointment: 3/8/2023

## 2023-01-31 NOTE — LETTER
----- Message from MAGNO Stephen sent at 1/31/2023  3:34 PM CST -----  Please update patient with chest x-ray results revealing stable x-ray results.  No acute infection or infiltrate is noted.  Would recommend patient complete steroid as prescribed as well as albuterol inhaler for coughing.  Patient to closely monitor symptoms, if symptoms persist or fail to improve, patient to be re-evaluated in office.  If symptoms worsen, ER evaluation is recommended.  Thank you   February 25, 2019     Marcelino Rinaldi MD  [place] [id number]  [place] [address] [place]    Patient: Jazmin Oseguera   YOB: 1995   Date of Visit: 2/25/2019       Dear Dr Flaquito Wellington: Thank you for referring Sanjuanita Arevalo to me for evaluation  Below are my notes for this consultation  If you have questions, please do not hesitate to call me  I look forward to following your patient along with you  Sincerely,        Holger Saldivar MD        CC: No Recipients  Holger Saldivar MD  2/25/2019  3:40 PM  [facility]   [facility]    [address] [place] [address], [place] [address]  Phone#  [phone]  Fax#  [phone]  *-*-*-*-*-*-*-*-*-*-*-*-*-*-*-*-*-*-*-*-*-*-*-*-*-*-*-*-*-*-*-*-*-*-*-*-*-*-*-*-*-*-*-*-*-*-*-*-*-*-*-*-*-*  Klever Plata DATE: 02/25/19 3:40 PM  PATIENT NAME: Lucian Mayer   1995    [id number]  Age: 21 y o  Sex: female  AUTHOR: Holger Saldivar MD  PRIMARYCARE PHYSICIAN: Marcelino Rinaldi MD  *-*-*-*-*-*-*-*-*-*-*-*-*-*-*-*-*-*-*-*-*-*-*-*-*-*-*-*-*-*-*-*-*-*-*-*-*-*-*-*-*-*-*-*-*-*-*-*-*-*-*-*-*-*-  REASON FOR REFERRAL:  Evaluation of chest pain  *-*-*-*-*-*-*-*-*-*-*-*-*-*-*-*-*-*-*-*-*-*-*-*-*-*-*-*-*-*-*-*-*-*-*-*-*-*-*-*-*-*-*-*-*-*-*-*-*-*-*-*-*-*-  CARDIOLOGY ASSESSMENT & PLAN:  Other chest pain  Pleasant 21 yr old female with RF of family history of premature CAD and SCD presenting with recent chest pain  Has mild nonspecific EKG abnormality  Recent echocardiogram showed no significant abnormality  Exercise tolerance is good  Does have history of pelvic fracture which apparently has completely healed  BP and physical exam is overall unremarkable  Etiology of symptoms is likely nonspecific  Possible relation to recent use of Isoretinoin  However, given family history recommend further evaluation     - Will arrange for a exercise treadmill stress test  Apparantly this has already been arranged for this Friday  We will follow-up    - Have advised to maintain a diary of symptoms   - Reassured about likely nonspecific nature of symptoms but need for further evaluation  Advised to continue all normal activities  *-*-*-*-*-*-*-*-*-*-*-*-*-*-*-*-*-*-*-*-*-*-*-*-*-*-*-*-*-*-*-*-*-*-*-*-*-*-*-*-*-*-*-*-*-*-*-*-*-*-*-*-*-*-  CURRENT ECG:  No results found for this visit on 02/25/19  ECG performed recently at primary physician's office indicates sinus rhythm with incomplete right bundle-branch block, QRS duration 100 milliseconds, RSR prime in lead V1, likely normal variant  HR 67 BPM     *-*-*-*-*-*-*-*-*-*-*-*-*-*-*-*-*-*-*-*-*-*-*-*-*-*-*-*-*-*-*-*-*-*-*-*-*-*-*-*-*-*-*-*-*-*-*-*-*-*-*-*-*-*-  HISTORY OF PRESENT ILLNESS:  Patient is a pleasant 15-year-old  female with medical history significant for:  1  History of horse back riding accident in [id number] with resulting pelvic and jaw fracture  2  History of anxiety  3  History of vitamin-D deficiency  4 history of eustachian tube dysfunction  5 history of TMJ syndrome  7  History chronic bilateral low back pain    She has been referred to us because she has been experiencing on and chest pain for the last 2 weeks  She describes this symptom as on an of aching and somewhat tightness in the chest   Symptom is occurring almost every day and occurs at random times  Sometimes with activity while other times at rest   There is no definite precipitating factor  but occasionally anxiety makes it worse   She also experiences some shortness of breath associated with this symptom but no definite palpitations  The location of the pain has very sometimes it is precordial while other times it is substernal and sometimes lateral to the left and to the right side  Pain does not become worse with taking deep breaths and there is no relationship with food  No history of presyncope/syncope no recent decline in exercise tolerance    She does mention that she had similar bout of frequent chest pains but to a lesser degree in November 2018 and she was evaluated in the emergency room  She mentions that she was using Isoretinoin therapy for her acne around the same time and symptoms seemed to improve after she discontinued the medication  She recently restarted therapy with this medicine and has just completed the course  She is wondering if her symptoms could be related to use of this medication  Otherwise she has been doing well  She does have chronic back pain and sciatica for which she has received physical therapy  She was previously on Lexapro for anxiety but she did not tolerate it due to insomnia and this was changed from to BuSpar, however at this time she is not taking any anxiety related and her symptoms are well controlled  She has been obvious very active and participated in school sports and other exercise activities and has had no presyncope/syncope  Her exercise tolerance has been good  *-*-*-*-*-*-*-*-*-*-*-*-*-*-*-*-*-*-*-*-*-*-*-*-*-*-*-*-*-*-*-*-*-*-*-*-*-*-*-*-*-*-*-*-*-*-*-*-*-*-*-*-*-*  PAST MEDICAL HISTORY:   Past Medical History:   Diagnosis Date    Fracture of pelvis ([address] )     Last Assessed:  3/1/17    Jaw fracture ([facility])     Last Assessed:  3/1/17       PAST SURGICAL HISTORY:   Past Surgical History:   Procedure Laterality Date    WISDOM TOOTH EXTRACTION         FAMILY HISTORY:  Family History   Problem Relation Age of Onset    Coronary artery disease Mother     Hyperlipidemia Mother     Hypothyroidism Mother     Diabetes Maternal Grandfather     Hernia Father      She is a strong family history of premature coronary artery disease with her mom having MI initially at 45s and passing away at the age of 58 years due to sudden cardiac death      SOCIAL HISTORY:  [unfilled]  Social History     Tobacco Use   Smoking Status Never Smoker   Smokeless Tobacco Never Used   Tobacco Comment    No secondhand smoke exposure     Social History     Substance and Sexual Activity   Alcohol Use No     Social History Substance and Sexual Activity   Drug Use No     Occasional alcohol use  Works as PCA at the [address]  *-*-*-*-*-*-*-*-*-*-*-*-*-*-*-*-*-*-*-*-*-*-*-*-*-*-*-*-*-*-*-*-*-*-*-*-*-*-*-*-*-*-*-*-*-*-*-*-*-*-*-*-*-*  ALLERGIES:  Allergies   Allergen Reactions    Lexapro [Escitalopram]      Unable to sleep      *-*-*-*-*-*-*-*-*-*-*-*-*-*-*-*-*-*-*-*-*-*-*-*-*-*-*-*-*-*-*-*-*-*-*-*-*-*-*-*-*-*-*-*-*-*-*-*-*-*-*-*-*-*  CURRENT OUTPATIENT MEDICATIONS:     Current Outpatient Medications:     azelastine (ASTELIN) 0 1 % nasal spray, 2 sprays into each nostril 2 (two) times a day Use in each nostril as directed, Disp: 30 mL, Rfl: 3    cetirizine (ZyrTEC) 10 mg tablet, Take 10 mg by mouth daily, Disp: , Rfl:     Cholecalciferol (VITAMIN D-3) 5000 units TABS, Take 1 tablet daily, Disp: 1 tablet, Rfl: 0    cyclobenzaprine (FLEXERIL) 5 mg tablet, Take 2 tablets (10 mg total) by mouth 3 (three) times a day as needed for muscle spasms, Disp: 30 tablet, Rfl: 0    DAYSEE 0 15-0 03 &0 01 MG TABS, TAKE 1 TABLET BY MOUTH EVERY DAY, Disp: 28 each, Rfl: 0    DENTA 5000 PLUS 1 1 % CREA, USE A PEA SIZED AMOUNT AT BEDTIME  DO NOT RINSE, EAT OR DRINK FOR 30MINS, Disp: , Rfl: 0    fluticasone (FLONASE ALLERGY RELIEF) 50 mcg/act nasal spray, into each nostril, Disp: , Rfl:     Multiple Vitamins-Minerals (HAIR SKIN AND NAILS FORMULA PO), Take by mouth, Disp: , Rfl:     MYORISAN 30 MG capsule, TAKE ONE CAPSULE BY MOUTH TWICE A DAY W/ FATTY FOOD OR WHOLE MILK, Disp: , Rfl: 0    triamcinolone (KENALOG) 0 1 % cream, APPLY SMALL AMOUNT TWICE A DAY X 2 WEEKS TO THE B/L HANDS AND ELBOW THEN AS NEEDED FOR FLARES, Disp: , Rfl: 0    *-*-*-*-*-*-*-*-*-*-*-*-*-*-*-*-*-*-*-*-*-*-*-*-*-*-*-*-*-*-*-*-*-*-*-*-*-*-*-*-*-*-*-*-*-*-*-*-*-*-*-*-*-*  REVIEW OF SYMPTOMS:    Positive for:  ***  Negative for: All remaining as reviewed below and in HPI      SYSTEM SYMPTOMS REVIEWED:  Generalweight change, fever, night sweats  Respirato  Cardiovascularchest pain, syncope, dyspnea on exertion, edema, decline in exercise tolerance, claudication   Gastrointestinalpersistent vomiting, diarrhea, abdominal distention, blood in stool   Muscular or skeletaljoint pain or swelling   Neurologicheadaches, syncope, abnormal movement  Hematologichistory of easy bruising and bleeding   Endocrinethyroid enlargement, heat or cold intolerance, polyuria   Psychiatricanxiety, depression     *-*-*-*-*-*-*-*-*-*-*-*-*-*-*-*-*-*-*-*-*-*-*-*-*-*-*-*-*-*-*-*-*-*-*-*-*-*-*-*-*-*-*-*-*-*-*-*-*-*-*-*-*-*-  VITAL SIGNS:  Vitals:    02/25/19 1451   BP: 100/70   Pulse: 56   SpO2: 99%   Weight: 60 3 kg (133 lb)   Height: 5' 9" (1 753 m)       *-*-*-*-*-*-*-*-*-*-*-*-*-*-*-*-*-*-*-*-*-*-*-*-*-*-*-*-*-*-*-*-*-*-*-*-*-*-*-*-*-*-*-*-*-*-*-*-*-*-*-*-*-*-  PHYSICAL EXAM:  General Appearance:    Alert, cooperative, no distress, appears stated age, normal built  Head, Eyes, ENT:    No obvious abnormality, moist mucous mebranes  Neck:   Supple, no carotid bruit or JVD   Back:     Symmetric, no curvature  Lungs:     Respirations unlabored  Clear to auscultation bilaterally,    Chest wall:    No tenderness or deformity   Heart:     Slightly bradycardic, regular rate and rhythm, S1 and S2 normal, no murmur, rub  or gallop  Abdomen:     Soft, non-tender, No obvious masses, or organomegaly   Extremities:   Extremities normal, no cyanosis or edema    Skin:   Skin color, texture, turgor normal, no rashes or lesions     *-*-*-*-*-*-*-*-*-*-*-*-*-*-*-*-*-*-*-*-*-*-*-*-*-*-*-*-*-*-*-*-*-*-*-*-*-*-*-*-*-*-*-*-*-*-*-*-*-*-*-*-*-*-  LABORATORY DATA:  I have personally reviewed pertinent labs      Potassium   Date Value Ref Range Status   11/24/2018 3 5 3 5 - 5 3 mmol/L Final     Chloride   Date Value Ref Range Status   11/24/2018 105 100 - 108 mmol/L Final     CO2   Date Value Ref Range Status   11/24/2018 24 21 - 32 mmol/L Final     BUN   Date Value Ref Range Status 11/24/2018 12 5 - 25 mg/dL Final     Creatinine   Date Value Ref Range Status   11/24/2018 0 73 0 60 - 1 30 mg/dL Final     Comment:     Standardized to IDMS reference method     eGFR   Date Value Ref Range Status   11/24/2018 117 ml/min/1 73sq m Final     Calcium   Date Value Ref Range Status   11/24/2018 10 0 8 3 - 10 1 mg/dL Final     AST   Date Value Ref Range Status   11/24/2018 15 5 - 45 U/L Final     Comment:       Specimen collection should occur prior to Sulfasalazine administration due to the potential for falsely depressed results  ALT   Date Value Ref Range Status   11/24/2018 13 12 - 78 U/L Final     Comment:       Specimen collection should occur prior to Sulfasalazine and/or Sulfapyridine administration due to the potential for falsely depressed results        Alkaline Phosphatase   Date Value Ref Range Status   11/24/2018 36 (L) 46 - 116 U/L Final     WBC   Date Value Ref Range Status   11/24/2018 6 47 4 31 - 10 16 Thousand/uL Final     Hemoglobin   Date Value Ref Range Status   11/24/2018 12 8 11 5 - 15 4 g/dL Final     Platelets   Date Value Ref Range Status   11/24/2018 234 149 - 390 Thousands/uL Final     No results found for: PT, PTT, INR  No results found for: CKMB, BNP, DIGOXIN  No results found for: TSH  Cholesterol   Date Value Ref Range Status   12/29/2014 179 (H) 125 - 170 mg/dL Final     HDL   Date Value Ref Range Status   12/29/2014 42 36 - 76 mg/dL Final     Triglycerides   Date Value Ref Range Status   12/29/2014 93 40 - 136 mg/dL Final      No results found for: HGBA1C  No results found for: BLOODCX, SPUTUMCULTUR, GRAMSTAIN, URINECX, WOUNDCULT, BODYFLUIDCUL, MRSACULTURE, INFLUAPCR, INFLUBPCR, RSVPCR, LEGIONELLAUR, CDIFFTOXINB    *-*-*-*-*-*-*-*-*-*-*-*-*-*-*-*-*-*-*-*-*-*-*-*-*-*-*-*-*-*-*-*-*-*-*-*-*-*-*-*-*-*-*-*-*-*-*-*-*-*-*-*-*-*-  RADIOLOGY RESULTS:  No results found     *-*-*-*-*-*-*-*-*-*-*-*-*-*-*-*-*-*-*-*-*-*-*-*-*-*-*-*-*-*-*-*-*-*-*-*-*-*-*-*-*-*-*-*-*-*-*-*-*-*-*-*-*-*-  ECHOCARDIOGRAM AND OTHER CARDIOLOGY RESULTS:  Results for orders placed during the hospital encounter of 18   Echo complete with contrast if indicated    Narrative [address]   [facility], [address] [facility]    Transthoracic Echocardiogram  2D, M-mode, Doppler, and Color Doppler    Study date:  26-Dec-2018    Patient: Jerri Walsh  MR number: [id number]  Account number: [de-identified]  : 1995  Age: 23 years  Gender: Female  Status: Outpatient  Location: [address] [place] [address]  Height: 69 in  Weight: 124 7 lb  BP: 104/ 74 mmHg    Indications: Abnormal Electrocardiogram, chest pain    Diagnoses: R94 31 - Abnormal electrocardiogram [ECG] [EKG]    Sonographer:  Rosauar Carranza CCT,RCS  Primary Physician:  Juancarlos Crenshaw MD  Group:  [facility]  Interpreting Physician:  Brigitte Marlow MD    IMPRESSIONS:  1  Normal left systolic and diastolic function, EF around 60-65%  2  No significant chamber hypertrophy or enlargement  3  No significant aortic or mitral valve disease noted  4  Trace tricuspid valve regurgitation  5  No obvious pulmonary hypertension  6  No pericardial effusion  No previous echocardiogram is available for comparison  SUMMARY    LEFT VENTRICLE:  Normal left ventricular cavity size, normal wall thickness, normal left ventricle systolic function and wall motion  Ejection fraction is estimated as around 60-65%  Normal diastolic function  RIGHT VENTRICLE:  Normal right ventricular size and systolic function  Normal estimated right ventricular systolic pressure  LEFT ATRIUM:  Normal left atrial cavity size  Intact interatrial septum  RIGHT ATRIUM:  Normal right atrial cavity size  MITRAL VALVE:  Normal appearing mitral valve structure and leaflet motion  Trace mitral valve regurgitation      AORTIC VALVE:  Tricuspid aortic valve with no significant sclerosis  No aortic valve stenosis or regurgitation  TRICUSPID VALVE:  Trace tricuspid valve regurgitation  PULMONIC VALVE:  No significant pulmonic valve regurgitation  AORTA:  Aortic root and proximal ascending aorta are normal in size on 2D imaging  IVC, HEPATIC VEINS:  Inferior vena cava is normal in size and demonstrates appropriate respiratory phasic changes in diameter  PERICARDIUM:  No pericardial effusion  HISTORY: PRIOR HISTORY: Murmur    PROCEDURE: The study was performed in the [employer]  This was a routine study  The transthoracic approach was used  The study included complete 2D imaging, M-mode, complete spectral Doppler, and color Doppler  The heart rate was  70 bpm, at the start of the study  Images were obtained from the parasternal, apical, subcostal, and suprasternal notch acoustic windows  Image quality was adequate  LEFT VENTRICLE: Normal left ventricular cavity size, normal wall thickness, normal left ventricle systolic function and wall motion  Ejection fraction is estimated as around 60-65%  Normal diastolic function  RIGHT VENTRICLE: Normal right ventricular size and systolic function  Normal estimated right ventricular systolic pressure  LEFT ATRIUM: Normal left atrial cavity size  Intact interatrial septum  RIGHT ATRIUM: Normal right atrial cavity size  MITRAL VALVE: Normal appearing mitral valve structure and leaflet motion  Trace mitral valve regurgitation  AORTIC VALVE: Tricuspid aortic valve with no significant sclerosis  No aortic valve stenosis or regurgitation  TRICUSPID VALVE: Trace tricuspid valve regurgitation  PULMONIC VALVE: No significant pulmonic valve regurgitation  PERICARDIUM: No pericardial effusion  AORTA: Aortic root and proximal ascending aorta are normal in size on 2D imaging      SYSTEMIC VEINS: IVC: Inferior vena cava is normal in size and demonstrates appropriate respiratory phasic changes in diameter  SYSTEM MEASUREMENT TABLES    2D  %FS: 37 74 %  Ao Diam: 2 94 cm  EDV(Teich): 80 48 ml  EF(Teich): 68 2 %  ESV(Teich): 25 59 ml  IVSd: 0 73 cm  LA Area: 14 89 cm2  LA Diam: 2 9 cm  LVEDV MOD A4C: 79 6 ml  LVEF MOD A4C: 66 97 %  LVESV MOD A4C: 26 29 ml  LVIDd: 4 24 cm  LVIDs: 2 64 cm  LVLd A4C: 7 92 cm  LVLs A4C: 6 3 cm  LVPWd: 0 78 cm  RA Area: 13 16 cm2  RVIDd: 3 01 cm  SV MOD A4C: 53 31 ml  SV(Teich): 54 89 ml    CW  AV Vmax: 1 32 m/s  AV maxP 93 mmHg  RAP: 10 mmHg  TR Vmax: 1 87 m/s  TR maxP 98 mmHg    MM  TAPSE: 2 16 cm    PW  E': 0 14 m/s  E' Sept: 0 14 m/s  E/E': 4 27  E/E' Sept: 4 27  LVOT Vmax: 0 91 m/s  LVOT maxPG: 3 32 mmHg  MV A Jerardo: 0 51 m/s  MV Dec Cidra: 3 62 m/s2  MV DecT: 170 2 ms  MV E Jerardo: 0 62 m/s  MV E/A Ratio: 1 21  MV PHT: 49 36 ms  MVA By PHT: 4 46 cm2  PAEDP: 15 34 mmHg  PRend P 34 mmHg  PRend Vmax: 1 15 m/s  PV Vmax: 1 1 m/s  PV maxP 89 mmHg  RVSP: 23 98 mmHg    [facility]    Prepared and electronically signed by    Anny Benedict MD  Signed 27-Dec-2018 18:24:25       No results found for this or any previous visit  No results found for this or any previous visit  No results found for this or any previous visit       *-*-*-*-*-*-*-*-*-*-*-*-*-*-*-*-*-*-*-*-*-*-*-*-*-*-*-*-*-*-*-*-*-*-*-*-*-*-*-*-*-*-*-*-*-*-*-*-*-*-*-*-*-*-  SIGNATURES:   @EDC@   Anny Benedict MD     CC:   MD Melecio Boogie MD

## 2023-03-21 ENCOUNTER — TELEPHONE (OUTPATIENT)
Dept: OBGYN CLINIC | Facility: MEDICAL CENTER | Age: 28
End: 2023-03-21

## 2023-03-21 DIAGNOSIS — B37.31 VULVOVAGINAL CANDIDIASIS: Primary | ICD-10-CM

## 2023-03-21 RX ORDER — FLUCONAZOLE 150 MG/1
150 TABLET ORAL ONCE
Qty: 1 TABLET | Refills: 0 | Status: SHIPPED | OUTPATIENT
Start: 2023-03-21 | End: 2023-03-21

## 2023-03-21 NOTE — TELEPHONE ENCOUNTER
Patient requesting refill for Fluconozole  Patient would like medication to be sent to [address]  [address] [address] [facility] [address]  Please review  Thank you!

## 2023-03-27 ENCOUNTER — TELEPHONE (OUTPATIENT)
Dept: OBGYN CLINIC | Facility: MEDICAL CENTER | Age: 28
End: 2023-03-27

## 2023-03-27 DIAGNOSIS — B37.31 VULVOVAGINAL CANDIDIASIS: Primary | ICD-10-CM

## 2023-03-27 RX ORDER — FLUCONAZOLE 150 MG/1
150 TABLET ORAL ONCE
Qty: 1 TABLET | Refills: 0 | Status: SHIPPED | OUTPATIENT
Start: 2023-03-27 | End: 2023-03-27

## 2023-03-27 NOTE — TELEPHONE ENCOUNTER
Pt called and explained she wanted a refill of the fluconazole cream since her symptoms did not completely clear up yet  Please advise

## 2023-12-05 ENCOUNTER — TELEPHONE (OUTPATIENT)
Dept: FAMILY MEDICINE CLINIC | Facility: CLINIC | Age: 28
End: 2023-12-05

## 2023-12-05 NOTE — TELEPHONE ENCOUNTER
On 2/21/2023 patient established care with Nora Moseley MD   [address], [address]   [place] [address]   Phone: [phone]   Fax: [phone]

## 2023-12-22 NOTE — TELEPHONE ENCOUNTER
12/22/23 7:12 AM        The office's request has been received, reviewed, and the patient chart updated. The PCP has successfully been removed with a patient attribution note. This message will now be completed.        Thank you  Rosemary Maya

## 2025-03-23 NOTE — ASSESSMENT & PLAN NOTE
Has regular follow-up with [facility], patient on Accutane for 3 months so far, she has regular blood work follow-up  No result is available to us  Statement Selected